# Patient Record
Sex: MALE | Race: WHITE | NOT HISPANIC OR LATINO
[De-identification: names, ages, dates, MRNs, and addresses within clinical notes are randomized per-mention and may not be internally consistent; named-entity substitution may affect disease eponyms.]

---

## 2018-03-09 ENCOUNTER — APPOINTMENT (OUTPATIENT)
Dept: DERMATOLOGY | Facility: CLINIC | Age: 73
End: 2018-03-09
Payer: MEDICARE

## 2018-03-09 PROCEDURE — 99214 OFFICE O/P EST MOD 30 MIN: CPT

## 2022-01-26 ENCOUNTER — TRANSCRIPTION ENCOUNTER (OUTPATIENT)
Age: 77
End: 2022-01-26

## 2022-06-15 ENCOUNTER — RESULT REVIEW (OUTPATIENT)
Age: 77
End: 2022-06-15

## 2022-06-16 ENCOUNTER — APPOINTMENT (OUTPATIENT)
Dept: DERMATOLOGY | Facility: CLINIC | Age: 77
End: 2022-06-16

## 2022-06-16 PROCEDURE — 17261 DSTRJ MAL LES T/A/L .6-1.0CM: CPT

## 2022-06-16 PROCEDURE — 99203 OFFICE O/P NEW LOW 30 MIN: CPT | Mod: 25

## 2022-06-20 ENCOUNTER — APPOINTMENT (OUTPATIENT)
Dept: DERMATOLOGY | Facility: CLINIC | Age: 77
End: 2022-06-20

## 2024-03-22 ENCOUNTER — APPOINTMENT (OUTPATIENT)
Dept: NEPHROLOGY | Facility: CLINIC | Age: 79
End: 2024-03-22
Payer: MEDICARE

## 2024-03-22 VITALS
HEART RATE: 84 BPM | DIASTOLIC BLOOD PRESSURE: 74 MMHG | WEIGHT: 141 LBS | SYSTOLIC BLOOD PRESSURE: 150 MMHG | OXYGEN SATURATION: 98 % | TEMPERATURE: 98 F

## 2024-03-22 DIAGNOSIS — Z00.00 ENCOUNTER FOR GENERAL ADULT MEDICAL EXAMINATION W/OUT ABNORMAL FINDINGS: ICD-10-CM

## 2024-03-22 DIAGNOSIS — Z78.9 OTHER SPECIFIED HEALTH STATUS: ICD-10-CM

## 2024-03-22 PROCEDURE — 99205 OFFICE O/P NEW HI 60 MIN: CPT

## 2024-03-22 NOTE — PHYSICAL EXAM
[General Appearance - In No Acute Distress] : in no acute distress [General Appearance - Alert] : alert [PERRL With Normal Accommodation] : pupils were equal in size, round, and reactive to light [Sclera] : the sclera and conjunctiva were normal [Extraocular Movements] : extraocular movements were intact [Outer Ear] : the ears and nose were normal in appearance [Neck Appearance] : the appearance of the neck was normal [Oropharynx] : the oropharynx was normal [Neck Cervical Mass (___cm)] : no neck mass was observed [Jugular Venous Distention Increased] : there was no jugular-venous distention [Thyroid Nodule] : there were no palpable thyroid nodules [Thyroid Diffuse Enlargement] : the thyroid was not enlarged [Auscultation Breath Sounds / Voice Sounds] : lungs were clear to auscultation bilaterally [Heart Sounds] : normal S1 and S2 [Heart Rate And Rhythm] : heart rate was normal and rhythm regular [Murmurs] : no murmurs [Heart Sounds Gallop] : no gallops [Heart Sounds Pericardial Friction Rub] : no pericardial rub [Bowel Sounds] : normal bowel sounds [FreeTextEntry1] : Trace edema [Abdomen Soft] : soft [Abdomen Tenderness] : non-tender [] : no hepato-splenomegaly [Abdomen Mass (___ Cm)] : no abdominal mass palpated [Cervical Lymph Nodes Enlarged Posterior Bilaterally] : posterior cervical [Cervical Lymph Nodes Enlarged Anterior Bilaterally] : anterior cervical [Supraclavicular Lymph Nodes Enlarged Bilaterally] : supraclavicular [Axillary Lymph Nodes Enlarged Bilaterally] : axillary [Femoral Lymph Nodes Enlarged Bilaterally] : femoral [Inguinal Lymph Nodes Enlarged Bilaterally] : inguinal [No CVA Tenderness] : no ~M costovertebral angle tenderness [No Spinal Tenderness] : no spinal tenderness [Abnormal Walk] : normal gait [Musculoskeletal - Swelling] : no joint swelling seen [Nail Clubbing] : no clubbing  or cyanosis of the fingernails [Motor Tone] : muscle strength and tone were normal

## 2024-03-22 NOTE — HISTORY OF PRESENT ILLNESS
[FreeTextEntry1] : 77 yo man second opinion PKD, CKD Hx Htn, HLD Was told 10 years ago of PKD Over the past 2 years the SCr yolette from 2.9 to 4.2 He has no uremic sxs There is no FH of kidney dz   no FH PKD He is here to better understand his ESKD options

## 2024-03-22 NOTE — QUALITY MEASURES
[No] : patient is not an appropriate candidate for kidney transplantation evaluation [PTH level measured within last] : patient's PTH level measured within the last 12 months

## 2024-03-25 LAB
ABO + RH PNL BLD: NORMAL
ANION GAP SERPL CALC-SCNC: 22 MMOL/L
BUN SERPL-MCNC: 61 MG/DL
CALCIUM SERPL-MCNC: 8.8 MG/DL
CHLORIDE SERPL-SCNC: 98 MMOL/L
CO2 SERPL-SCNC: 24 MMOL/L
CREAT SERPL-MCNC: 5.11 MG/DL
EGFR: 11 ML/MIN/1.73M2
GLUCOSE SERPL-MCNC: 67 MG/DL
POTASSIUM SERPL-SCNC: 4.9 MMOL/L
SODIUM SERPL-SCNC: 143 MMOL/L

## 2024-04-11 ENCOUNTER — APPOINTMENT (OUTPATIENT)
Dept: DERMATOLOGY | Facility: CLINIC | Age: 79
End: 2024-04-11
Payer: MEDICARE

## 2024-04-11 PROCEDURE — 99213 OFFICE O/P EST LOW 20 MIN: CPT | Mod: 25

## 2024-04-11 PROCEDURE — 11102 TANGNTL BX SKIN SINGLE LES: CPT

## 2024-04-24 ENCOUNTER — APPOINTMENT (OUTPATIENT)
Dept: NEPHROLOGY | Facility: CLINIC | Age: 79
End: 2024-04-24
Payer: COMMERCIAL

## 2024-04-24 ENCOUNTER — APPOINTMENT (OUTPATIENT)
Dept: TRANSPLANT | Facility: CLINIC | Age: 79
End: 2024-04-24
Payer: COMMERCIAL

## 2024-04-24 ENCOUNTER — NON-APPOINTMENT (OUTPATIENT)
Age: 79
End: 2024-04-24

## 2024-04-24 ENCOUNTER — LABORATORY RESULT (OUTPATIENT)
Age: 79
End: 2024-04-24

## 2024-04-24 VITALS
HEIGHT: 66 IN | TEMPERATURE: 98 F | DIASTOLIC BLOOD PRESSURE: 75 MMHG | OXYGEN SATURATION: 98 % | RESPIRATION RATE: 14 BRPM | BODY MASS INDEX: 22.66 KG/M2 | SYSTOLIC BLOOD PRESSURE: 146 MMHG | HEART RATE: 85 BPM | WEIGHT: 141 LBS

## 2024-04-24 PROCEDURE — 99204 OFFICE O/P NEW MOD 45 MIN: CPT

## 2024-04-24 PROCEDURE — 99205 OFFICE O/P NEW HI 60 MIN: CPT

## 2024-04-24 RX ORDER — ROSUVASTATIN CALCIUM 20 MG/1
20 TABLET, FILM COATED ORAL DAILY
Refills: 0 | Status: ACTIVE | COMMUNITY

## 2024-04-24 RX ORDER — AMLODIPINE BESYLATE 10 MG/1
10 TABLET ORAL DAILY
Refills: 0 | Status: ACTIVE | COMMUNITY

## 2024-04-24 RX ORDER — ASCORBIC ACID 125 MG
5000 TABLET,CHEWABLE ORAL DAILY
Refills: 0 | Status: ACTIVE | COMMUNITY

## 2024-04-24 RX ORDER — PSYLLIUM HUSK 0.4 G
CAPSULE ORAL
Refills: 0 | Status: ACTIVE | COMMUNITY

## 2024-04-24 RX ORDER — ALLOPURINOL 300 MG/1
300 TABLET ORAL DAILY
Refills: 0 | Status: ACTIVE | COMMUNITY

## 2024-04-24 RX ORDER — LABETALOL HYDROCHLORIDE 100 MG/1
100 TABLET, FILM COATED ORAL 3 TIMES DAILY
Refills: 0 | Status: ACTIVE | COMMUNITY

## 2024-04-24 RX ORDER — SODIUM BICARBONATE 650 MG/1
TABLET ORAL
Refills: 0 | Status: ACTIVE | COMMUNITY

## 2024-04-24 NOTE — HISTORY OF PRESENT ILLNESS
[TextBox_42] :   Mr. FLORY CARCAMO is a 79 year old man presenting for preemptive kidney transplant evaluation.  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 Saw Dr. Rodgers in March and referred for transplatn evaluation. He has a potential livign donor     CKD wad diagnosed 10 years ago when an abnormal creatinine prompted imaging. He has no known family history of kidney disease, but parents  at a young age ~ 40's. .  He has had a very slow progression of kidney disease, now eGFR 11ml/min. No uremic symptoms.  He has hypertension diagnosed 20 years ago, well controlled on medications Hyperlipidemia also controlled on statin Remote history of gout, remains on allopurinol but no flares in years.  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 Basal cell cancer recently dx Moh's planned, no history of melanoma.   Surgical History Left inguinal hernia repair 3 years ago.   No history of DM  No history of coronary artery disease. No known history of peripheral vascular disease, no claudication No history of DVT or PE No history of Stroke, no Seizures No history of lung problems including COPD, Asthma, Sleep apnea, pneumonia, bronchitis. No history of kidney stones, voiding problems, urological procedures/surgeries, recurrent urinary tract infections, gross hematuria. No history of viral infections such as hepatitis or HIV, no history of tuberculosis No history of bleeding problems No history of NSAID or Opioid use  No Sensitizing events: - No blood transfusions, prior transplant    Most recent Hospitalizations for :  Hernia repair 3 years ago, done at [place].    Exercise tolerance -: Very active, walks 3 miles a day, no limitations. Independent with ADLs, no assist devises, drives   Health Maintenance Colonoscopy: 5-7 years ago. No polyps  PSA reportedly normal  Vaccinations: Flu fall , COVID x4, last one in      Potential live donors : Friend.    Family History:- Father and mother  when he was 12 years old. He does not know their medical problems.  He had 1 brother, passed away from meningitis.  Family medical history is mostly unknown.   Social history: -  for 43 years, lives with wife and dog. No children. [place] have a house in [place], likes the sun.  Used to own an automobile company, retired.   Drinks 3 glasses of wine every night, never smoked cigarettes. smokes marijuana daily,   History of recreational cocaine use in the 70's, never used IV drugs.   Medications Allopurinol Rosuvastatin Amlodipine Vitamin B12 Folate  Labetalol  Sodium bicarbonate  Famotidine   Not on blood thinners Aspirin, Plavix, Eliquis, Coumadin No herbal supplements

## 2024-04-24 NOTE — PLAN
[FreeTextEntry1] :   Proceed with completing the pre transplant work up - Pre transplant labs will be drawn today including ABO, HLA, Luminex, serology for active and chronic infections   - Cardiac:  Send for echo and stress test, cardiology evaluation   - Cancer screening:  Colonoscopy, PSA, Dermatology   - Imaging: CXR, non contrast CT abdomen and pelvis, MRA brain r/o aneurysm.   Once workup is complete, patient will be presented for review by the multidisciplinary listing committee.

## 2024-04-24 NOTE — ASSESSMENT
[FreeTextEntry1] :     Mr. FLORY CARCAMO is a 79 year old M evaluated for preemptive kidney transplantation. Etiology of kidney disease is Polycystic Kidney disease diagnosed 10 years ago with slow progression.  He has no signs or symptoms of uremia, mild LE edema, otherwise in good health.  No history of cyst pain, hematuria. rupture.  No known CAD, DM, PVD.  HTN is well controlled on medications. He has squamous cell ca of lip 25 yrs ago, and BCC in the face recently dx, regularly followed by dermatology. Had a colonoscopy 5-7years ago, reportedly normal.  Excellent functional status walks 3 miles a day.  BMI is 22.7  There is no evidence of an active medical or psychiatric illness, malignancy or infection that would preclude kidney transplantation. Despite his advanced age he is healthy and has excellent functional status. Also has a potential donor. He is a good candidate for kidney transplantation - living donor would be best given he is preemptive and no waiting time.

## 2024-04-24 NOTE — REASON FOR VISIT
[Initial] : an initial visit for [Kidney Transplant Evaluation] : kidney transplant evaluation [Spouse] : spouse

## 2024-04-24 NOTE — END OF VISIT
[Time Spent: ___ minutes] : I have spent [unfilled] minutes of time on the encounter. [] : I have personally discussed the risks and benefits of transplantation and patient attended transplant education class where the following was disclosed: Reviewed factors affecting survival and morbidity while on dialysis, the transplant wait list and reviewed morris-operative and long-term risk factors affecting outcome in kidney transplantation.  One year SRTR outcomes for national and [facility] were discussed in regards to patient survival and graft survival after transplantation.  Details of transplant surgery, including complications were discussed. Immunosuppression and complications including infection, malignancy and new onset diabetes were discussed.  Benefits of live donor transplantation as well as variability in wait times across regions and multiple listing were discussed. KDPI >85% and PHS high risk criteria donors were discussed. HCV kidney transplantation was discussed. Will proceed with completing/ updating work up and listing for transplant/ live donor transplant once work up is reviewed and found to be acceptable by multidisciplinary listing committee.

## 2024-04-24 NOTE — PHYSICAL EXAM
[General Appearance - Alert] : alert [General Appearance - In No Acute Distress] : in no acute distress [General Appearance - Well Nourished] : well nourished [General Appearance - Well Developed] : well developed [General Appearance - Well-Appearing] : healthy appearing [Sclera] : the sclera and conjunctiva were normal [PERRL With Normal Accommodation] : pupils were equal in size, round, and reactive to light [Oropharynx] : the oropharynx was normal [Neck Appearance] : the appearance of the neck was normal [Neck Cervical Mass (___cm)] : no neck mass was observed [Jugular Venous Distention Increased] : there was no jugular-venous distention [Respiration, Rhythm And Depth] : normal respiratory rhythm and effort [Exaggerated Use Of Accessory Muscles For Inspiration] : no accessory muscle use [Auscultation Breath Sounds / Voice Sounds] : lungs were clear to auscultation bilaterally [Heart Rate And Rhythm] : heart rate was normal and rhythm regular [Heart Sounds] : normal S1 and S2 [Murmurs] : no murmurs [Heart Sounds Gallop] : no gallops [Heart Sounds Pericardial Friction Rub] : no pericardial rub [Full Pulse] : the pedal pulses are present [Bowel Sounds] : normal bowel sounds [Abdomen Soft] : soft [Abdomen Tenderness] : non-tender [Abnormal Walk] : normal gait [Musculoskeletal - Swelling] : no joint swelling seen [] : right posterior tibial palpable [No Focal Deficits] : no focal deficits [Oriented To Time, Place, And Person] : oriented to person, place, and time [FreeTextEntry1] : 2+ ankle edema bilaterally

## 2024-04-24 NOTE — REVIEW OF SYSTEMS
[Tattoos] : tattoos [Anemia] : anemia [Fever] : no fever [Chills] : no chills [Recent Weight Gain (___ Lbs)] : no recent weight gain [Sore throat] : no sore throat [Double vision] : no double vision [Eye Pain] : no eye pain [Chest Pain] : no chest pain [Palpitations] : no palpitations [SOB] : no shortness of breath [Pleuritic Chest Pain] : no pleuritic chest pain [Abdominal Pain] : no abdominal pain [Nausea] : no nausea [Constipation] : no constipation [Diarrhea] : diarrhea [Vomiting] : no vomiting [Dysuria] : no dysuria [Frequency] : no frequency [Hematuria] : no hematuria [UTI] : no UTI [Headache] : no headache [Dizziness] : no dizziness [Fainting] : no fainting [Confusion] : no confusion [Memory Loss] : no memory loss [Unsteady Gait] : steady gait [Seizures] : no seizures [Suicidal] : not suicidal [Hallucinations] : no hallucinations [Anxiety] : no anxiety [Depression] : no depression [de-identified] : Last one was several years ago. 65 years ago.

## 2024-04-25 LAB
ABO + RH PNL BLD: NORMAL
ABO + RH PNL BLD: NORMAL
ALBUMIN SERPL ELPH-MCNC: 5 G/DL
ALP BLD-CCNC: 87 U/L
ALT SERPL-CCNC: 11 U/L
ANION GAP SERPL CALC-SCNC: 22 MMOL/L
APPEARANCE: CLEAR
AST SERPL-CCNC: 14 U/L
BACTERIA: NEGATIVE /HPF
BASOPHILS # BLD AUTO: 0.03 K/UL
BASOPHILS NFR BLD AUTO: 0.3 %
BILIRUB SERPL-MCNC: 0.3 MG/DL
BILIRUBIN URINE: NEGATIVE
BLOOD URINE: NEGATIVE
BUN SERPL-MCNC: 61 MG/DL
C PEPTIDE SERPL-MCNC: 4 NG/ML
CALCIUM SERPL-MCNC: 9.2 MG/DL
CAST: 0 /LPF
CHLORIDE SERPL-SCNC: 103 MMOL/L
CHOLEST SERPL-MCNC: 127 MG/DL
CMV IGG SERPL QL: >10 U/ML
CMV IGG SERPL-IMP: POSITIVE
CO2 SERPL-SCNC: 19 MMOL/L
COLOR: YELLOW
COVID-19 NUCLEOCAPSID  GAM ANTIBODY INTERPRETATION: NEGATIVE
COVID-19 SPIKE DOMAIN ANTIBODY INTERPRETATION: POSITIVE
CREAT SERPL-MCNC: 4.58 MG/DL
CREAT SPEC-SCNC: 61 MG/DL
CREAT/PROT UR: 1.4 RATIO
EBV EA AB SER IA-ACNC: <5 U/ML
EBV EA AB TITR SER IF: POSITIVE
EBV EA IGG SER QL IA: >600 U/ML
EBV EA IGG SER-ACNC: NEGATIVE
EBV EA IGM SER IA-ACNC: NEGATIVE
EBV PATRN SPEC IB-IMP: NORMAL
EBV VCA IGG SER IA-ACNC: >750 U/ML
EBV VCA IGM SER QL IA: <10 U/ML
EGFR: 12 ML/MIN/1.73M2
EOSINOPHIL # BLD AUTO: 0.3 K/UL
EOSINOPHIL NFR BLD AUTO: 3.4 %
EPITHELIAL CELLS: 0 /HPF
EPSTEIN-BARR VIRUS CAPSID ANTIGEN IGG: POSITIVE
ESTIMATED AVERAGE GLUCOSE: 105 MG/DL
GLUCOSE QUALITATIVE U: NEGATIVE MG/DL
GLUCOSE SERPL-MCNC: 92 MG/DL
HAV IGM SER QL: NONREACTIVE
HBA1C MFR BLD HPLC: 5.3 %
HBV CORE IGG+IGM SER QL: REACTIVE
HBV SURFACE AB SER QL: REACTIVE
HBV SURFACE AB SERPL IA-ACNC: 283.5 MIU/ML
HBV SURFACE AG SER QL: NONREACTIVE
HCT VFR BLD CALC: 27.5 %
HCV AB SER QL: NONREACTIVE
HCV S/CO RATIO: 0.1 S/CO
HDLC SERPL-MCNC: 72 MG/DL
HEPATITIS A IGG ANTIBODY: NONREACTIVE
HGB BLD-MCNC: 9 G/DL
HIV1+2 AB SPEC QL IA.RAPID: NONREACTIVE
HSV 1+2 IGG SER IA-IMP: POSITIVE
HSV 1+2 IGG SER IA-IMP: POSITIVE
HSV1 IGG SER QL: 20.6 INDEX
HSV2 IGG SER QL: 6.58 INDEX
IMM GRANULOCYTES NFR BLD AUTO: 0.3 %
KETONES URINE: ABNORMAL MG/DL
LDLC SERPL CALC-MCNC: 45 MG/DL
LEUKOCYTE ESTERASE URINE: NEGATIVE
LYMPHOCYTES # BLD AUTO: 1.81 K/UL
LYMPHOCYTES NFR BLD AUTO: 20.7 %
MAGNESIUM SERPL-MCNC: 1.7 MG/DL
MAN DIFF?: NORMAL
MCHC RBC-ENTMCNC: 31.5 PG
MCHC RBC-ENTMCNC: 32.7 GM/DL
MCV RBC AUTO: 96.2 FL
MICROSCOPIC-UA: NORMAL
MONOCYTES # BLD AUTO: 0.81 K/UL
MONOCYTES NFR BLD AUTO: 9.2 %
NEUTROPHILS # BLD AUTO: 5.78 K/UL
NEUTROPHILS NFR BLD AUTO: 66.1 %
NITRITE URINE: NEGATIVE
NONHDLC SERPL-MCNC: 56 MG/DL
PH URINE: 8
PHOSPHATE SERPL-MCNC: 3.5 MG/DL
PLATELET # BLD AUTO: 307 K/UL
POTASSIUM SERPL-SCNC: 3.8 MMOL/L
PROT SERPL-MCNC: 7.7 G/DL
PROT UR-MCNC: 87 MG/DL
PROTEIN URINE: 100 MG/DL
PSA SERPL-MCNC: 4.91 NG/ML
RBC # BLD: 2.86 M/UL
RBC # FLD: 14 %
RED BLOOD CELLS URINE: 0 /HPF
RUBV IGG FLD-ACNC: 3.9 INDEX
RUBV IGG SER-IMP: POSITIVE
SARS-COV-2 AB SERPL IA-ACNC: 93.3 U/ML
SARS-COV-2 AB SERPL QL IA: 0.15 INDEX
SARS-COV-2 N GENE NPH QL NAA+PROBE: NOT DETECTED
SODIUM SERPL-SCNC: 144 MMOL/L
SPECIFIC GRAVITY URINE: 1.01
T GONDII AB SER-IMP: POSITIVE
T GONDII IGG SER QL: 49.6 IU/ML
T PALLIDUM AB SER QL IA: NEGATIVE
TRIGL SERPL-MCNC: 43 MG/DL
UROBILINOGEN URINE: 0.2 MG/DL
VZV AB TITR SER: POSITIVE
VZV IGG SER IF-ACNC: 1070 INDEX
WBC # FLD AUTO: 8.76 K/UL
WHITE BLOOD CELLS URINE: 0 /HPF

## 2024-04-29 LAB
M TB IFN-G BLD-IMP: NEGATIVE
QUANTIFERON TB PLUS MITOGEN MINUS NIL: >10 IU/ML
QUANTIFERON TB PLUS NIL: 0.02 IU/ML
QUANTIFERON TB PLUS TB1 MINUS NIL: 0 IU/ML
QUANTIFERON TB PLUS TB2 MINUS NIL: 0.01 IU/ML

## 2024-04-30 ENCOUNTER — NON-APPOINTMENT (OUTPATIENT)
Age: 79
End: 2024-04-30

## 2024-05-08 ENCOUNTER — APPOINTMENT (OUTPATIENT)
Dept: CARDIOLOGY | Facility: CLINIC | Age: 79
End: 2024-05-08

## 2024-05-09 ENCOUNTER — APPOINTMENT (OUTPATIENT)
Dept: DERMATOLOGY | Facility: CLINIC | Age: 79
End: 2024-05-09
Payer: MEDICARE

## 2024-05-09 DIAGNOSIS — C44.319 BASAL CELL CARCINOMA OF SKIN OF OTHER PARTS OF FACE: ICD-10-CM

## 2024-05-09 PROCEDURE — 17312 MOHS ADDL STAGE: CPT

## 2024-05-09 PROCEDURE — 17311 MOHS 1 STAGE H/N/HF/G: CPT

## 2024-05-09 PROCEDURE — 12052 INTMD RPR FACE/MM 2.6-5.0 CM: CPT

## 2024-05-09 NOTE — HISTORY OF PRESENT ILLNESS
[FreeTextEntry1] : MMS for BCC of R malar cheek  [de-identified] : 05/09/2024   Referred by: Dr. Zelaya  We had the pleasure of seeing your patient in consultation for Mohs Micrographic Surgery. Mr. FLORY CARCAMO is a 79 year old M who presents for MMS for BCC of the R central malar cheek. His dermatologist recommended biopsy of the lesion which the patient noticed growing.  Pending renal transplant  He has had Mohs surgery before with Dr. Hughes  SH: Here with wife Douglas Upcoming travel plans: None  Pertinent positives noted below  History of HIV or hepatitis: No Blood thinners: None  Antibiotic Prophylaxis: None  Medical implants: None He plans to have a renal transplant Nov 2024   The patient's review of systems questionnaire was reviewed. Education needs were identified. There were no barriers to learning.

## 2024-05-10 ENCOUNTER — NON-APPOINTMENT (OUTPATIENT)
Age: 79
End: 2024-05-10

## 2024-05-10 ENCOUNTER — APPOINTMENT (OUTPATIENT)
Dept: CARDIOLOGY | Facility: CLINIC | Age: 79
End: 2024-05-10
Payer: COMMERCIAL

## 2024-05-10 VITALS
HEIGHT: 66 IN | BODY MASS INDEX: 23.63 KG/M2 | SYSTOLIC BLOOD PRESSURE: 120 MMHG | HEART RATE: 73 BPM | WEIGHT: 147 LBS | OXYGEN SATURATION: 95 % | DIASTOLIC BLOOD PRESSURE: 60 MMHG

## 2024-05-10 VITALS — HEART RATE: 72 BPM | SYSTOLIC BLOOD PRESSURE: 118 MMHG | DIASTOLIC BLOOD PRESSURE: 60 MMHG

## 2024-05-10 DIAGNOSIS — I10 ESSENTIAL (PRIMARY) HYPERTENSION: ICD-10-CM

## 2024-05-10 DIAGNOSIS — E78.5 HYPERLIPIDEMIA, UNSPECIFIED: ICD-10-CM

## 2024-05-10 DIAGNOSIS — I45.10 UNSPECIFIED RIGHT BUNDLE-BRANCH BLOCK: ICD-10-CM

## 2024-05-10 DIAGNOSIS — I44.0 ATRIOVENTRICULAR BLOCK, FIRST DEGREE: ICD-10-CM

## 2024-05-10 PROCEDURE — 93000 ELECTROCARDIOGRAM COMPLETE: CPT

## 2024-05-10 PROCEDURE — 99205 OFFICE O/P NEW HI 60 MIN: CPT

## 2024-05-10 NOTE — HISTORY OF PRESENT ILLNESS
[FreeTextEntry1] : Mr. Ap Arana is an 79 year old man presenting for cardiovascular evaluation as a preemptive candidate for kidney transplant. Advanced kidney disease due to polycystic kidney disease. He has hypertension managed on medications and hyperlipidemia. There is no known history of coronary artery disease. 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 He is active, walks one to two miles a day. He has no dyspnea, chest pain or palpitations.

## 2024-05-10 NOTE — PHYSICAL EXAM
[Normal Rate] : normal [Rhythm Regular] : regular [Normal S1] : normal S1 [Normal S2] : normal S2 [No Murmur] : no murmurs heard [___ +] : bilateral [unfilled]U+ pitting edema to the ankles [2+] : left 2+ [Right Carotid Bruit] : no bruit heard over the right carotid [Left Carotid Bruit] : no bruit heard over the left carotid [1+] : left 1+ [No Abnormalities] : the abdominal aorta was not enlarged and no bruit was heard [Normal] : alert and oriented, normal memory

## 2024-05-10 NOTE — DISCUSSION/SUMMARY
[Patient] : the patient [EKG obtained to assist in diagnosis and management of assessed problem(s)] : EKG obtained to assist in diagnosis and management of assessed problem(s) [A-V Block] : A-V block [Bundle Branch Block] : ~T bundle branch block [Echocardiogram] : an echocardiogram [Hyperlipidemia] : hyperlipidemia [Hypertension] : hypertension [Responding to Treatment] : responding to treatment [de-identified] : 1st degree heart block, RBBB [FreeTextEntry1] : 79 year old man active and well with no known cardiac disease, normal exam, but EKG does demonstrate 1st degree heart block and RBBB which he has never been aware. Arranging cardiac echo and treadmill exercise stress test with radionuclide perfusion imaging.

## 2024-05-14 ENCOUNTER — APPOINTMENT (OUTPATIENT)
Dept: UROLOGY | Facility: CLINIC | Age: 79
End: 2024-05-14
Payer: MEDICARE

## 2024-05-14 VITALS
OXYGEN SATURATION: 97 % | DIASTOLIC BLOOD PRESSURE: 69 MMHG | SYSTOLIC BLOOD PRESSURE: 118 MMHG | HEART RATE: 76 BPM | BODY MASS INDEX: 23.3 KG/M2 | WEIGHT: 145 LBS | HEIGHT: 66 IN

## 2024-05-14 DIAGNOSIS — Z78.9 OTHER SPECIFIED HEALTH STATUS: ICD-10-CM

## 2024-05-14 PROCEDURE — 99205 OFFICE O/P NEW HI 60 MIN: CPT

## 2024-05-14 RX ORDER — GLUCOSAMINE HCL/CHONDROITIN SU 500-400 MG
CAPSULE ORAL
Refills: 0 | Status: DISCONTINUED | COMMUNITY
End: 2024-05-14

## 2024-05-14 RX ORDER — VITAMIN B COMPLEX
CAPSULE ORAL
Refills: 0 | Status: DISCONTINUED | COMMUNITY
End: 2024-05-14

## 2024-05-14 RX ORDER — VITAMIN K2 90 MCG
CAPSULE ORAL
Refills: 0 | Status: DISCONTINUED | COMMUNITY
End: 2024-05-14

## 2024-05-14 RX ORDER — LANSOPRAZOLE 15 MG/1
15 CAPSULE, DELAYED RELEASE ORAL
Qty: 60 | Refills: 0 | Status: ACTIVE | COMMUNITY
Start: 2024-05-14

## 2024-05-14 RX ORDER — FAMOTIDINE 40 MG/1
40 TABLET, FILM COATED ORAL DAILY
Refills: 0 | Status: DISCONTINUED | COMMUNITY
End: 2024-05-14

## 2024-05-14 NOTE — PHYSICAL EXAM
[Normal Appearance] : normal appearance [Well Groomed] : well groomed [General Appearance - In No Acute Distress] : no acute distress [Edema] : no peripheral edema [Respiration, Rhythm And Depth] : normal respiratory rhythm and effort [Exaggerated Use Of Accessory Muscles For Inspiration] : no accessory muscle use [Abdomen Soft] : soft [Abdomen Tenderness] : non-tender [Costovertebral Angle Tenderness] : no ~M costovertebral angle tenderness [Urethral Meatus] : meatus normal [Penis Abnormality] : normal circumcised penis [Urinary Bladder Findings] : the bladder was normal on palpation [Scrotum] : the scrotum was normal [Rectal Exam - Seminal Vesicles] : the seminal vesicles were normal [Epididymis] : the epididymides were normal [Testes Tenderness] : no tenderness of the testes [Testes Mass (___cm)] : there were no testicular masses [Anus Abnormality] : the anus and perineum were normal [Rectal Exam - Rectum] : digital rectal exam was normal [Prostate Enlargement] : the prostate was not enlarged [Prostate Tenderness] : the prostate was not tender [Prostate Size ___ (0-4)] : prostate size [unfilled] (scale: 0-4) [Normal Station and Gait] : the gait and station were normal for the patient's age [] : no rash [No Focal Deficits] : no focal deficits [Oriented To Time, Place, And Person] : oriented to person, place, and time [Affect] : the affect was normal [Mood] : the mood was normal [Inguinal Lymph Nodes Enlarged Bilaterally] : inguinal [de-identified] : 1 cm hard left sided mid prostate nodule.

## 2024-05-14 NOTE — HISTORY OF PRESENT ILLNESS
[FreeTextEntry1] : Patient is getting up at hourly at night to void. No dysuria. he feels empty after voiding.  no hesitancy.

## 2024-05-14 NOTE — ASSESSMENT
[FreeTextEntry1] : Impression"  prostate nodule elevated PSA CKD nocturia, suspect it is due to sleep apnea  Plan:  free and total PSA prostate MRI.  follow up 3 weeks.  recommended sleep study. he prefers to wait until after the other testing is done.

## 2024-05-14 NOTE — LETTER BODY
[Dear  ___] : Dear  [unfilled], [Courtesy Letter:] : I had the pleasure of seeing your patient, [unfilled], in my office today. [Please see my note below.] : Please see my note below. [Sincerely,] : Sincerely, [FreeTextEntry3] : Ed  Taj Lockett MD [facility]  [facility] [place] [facility] [employer] at [facility]

## 2024-05-15 LAB
PSA FREE FLD-MCNC: 24 %
PSA FREE SERPL-MCNC: 0.84 NG/ML
PSA SERPL-MCNC: 3.54 NG/ML

## 2024-05-21 ENCOUNTER — RESULT REVIEW (OUTPATIENT)
Age: 79
End: 2024-05-21

## 2024-05-21 ENCOUNTER — APPOINTMENT (OUTPATIENT)
Dept: CT IMAGING | Facility: CLINIC | Age: 79
End: 2024-05-21
Payer: COMMERCIAL

## 2024-05-21 ENCOUNTER — APPOINTMENT (OUTPATIENT)
Dept: MRI IMAGING | Facility: CLINIC | Age: 79
End: 2024-05-21
Payer: COMMERCIAL

## 2024-05-21 ENCOUNTER — OUTPATIENT (OUTPATIENT)
Dept: OUTPATIENT SERVICES | Facility: HOSPITAL | Age: 79
LOS: 1 days | End: 2024-05-21

## 2024-05-21 DIAGNOSIS — Z01.818 ENCOUNTER FOR OTHER PREPROCEDURAL EXAMINATION: ICD-10-CM

## 2024-05-21 PROCEDURE — 74176 CT ABD & PELVIS W/O CONTRAST: CPT | Mod: 26,MH

## 2024-05-21 PROCEDURE — 71250 CT THORAX DX C-: CPT | Mod: 26,MH

## 2024-05-21 PROCEDURE — 70551 MRI BRAIN STEM W/O DYE: CPT | Mod: 26,MH

## 2024-05-23 ENCOUNTER — OUTPATIENT (OUTPATIENT)
Dept: OUTPATIENT SERVICES | Facility: HOSPITAL | Age: 79
LOS: 1 days | End: 2024-05-23
Payer: MEDICARE

## 2024-05-23 ENCOUNTER — RESULT REVIEW (OUTPATIENT)
Age: 79
End: 2024-05-23

## 2024-05-23 ENCOUNTER — APPOINTMENT (OUTPATIENT)
Dept: MRI IMAGING | Facility: CLINIC | Age: 79
End: 2024-05-23
Payer: MEDICARE

## 2024-05-23 DIAGNOSIS — N40.2 NODULAR PROSTATE WITHOUT LOWER URINARY TRACT SYMPTOMS: ICD-10-CM

## 2024-05-23 DIAGNOSIS — R97.20 ELEVATED PROSTATE SPECIFIC ANTIGEN [PSA]: ICD-10-CM

## 2024-05-23 PROCEDURE — 76498 UNLISTED MR PROCEDURE: CPT

## 2024-05-23 PROCEDURE — 76498P: CUSTOM | Mod: 26,MH

## 2024-05-23 PROCEDURE — A9585: CPT

## 2024-05-23 PROCEDURE — 72197 MRI PELVIS W/O & W/DYE: CPT | Mod: 26,MH

## 2024-05-23 PROCEDURE — 72197 MRI PELVIS W/O & W/DYE: CPT

## 2024-05-29 ENCOUNTER — APPOINTMENT (OUTPATIENT)
Dept: NEPHROLOGY | Facility: CLINIC | Age: 79
End: 2024-05-29
Payer: MEDICARE

## 2024-05-29 ENCOUNTER — APPOINTMENT (OUTPATIENT)
Dept: INFECTIOUS DISEASE | Facility: CLINIC | Age: 79
End: 2024-05-29
Payer: COMMERCIAL

## 2024-05-29 VITALS
DIASTOLIC BLOOD PRESSURE: 63 MMHG | TEMPERATURE: 97.3 F | HEIGHT: 66 IN | WEIGHT: 146 LBS | HEART RATE: 75 BPM | SYSTOLIC BLOOD PRESSURE: 136 MMHG | OXYGEN SATURATION: 98 % | BODY MASS INDEX: 23.46 KG/M2

## 2024-05-29 VITALS
WEIGHT: 145 LBS | TEMPERATURE: 97.6 F | HEART RATE: 77 BPM | OXYGEN SATURATION: 96 % | DIASTOLIC BLOOD PRESSURE: 66 MMHG | HEIGHT: 66 IN | SYSTOLIC BLOOD PRESSURE: 118 MMHG | BODY MASS INDEX: 23.3 KG/M2

## 2024-05-29 DIAGNOSIS — Z23 ENCOUNTER FOR IMMUNIZATION: ICD-10-CM

## 2024-05-29 DIAGNOSIS — R76.8 OTHER SPECIFIED ABNORMAL IMMUNOLOGICAL FINDINGS IN SERUM: ICD-10-CM

## 2024-05-29 PROCEDURE — 99204 OFFICE O/P NEW MOD 45 MIN: CPT

## 2024-05-29 PROCEDURE — G2211 COMPLEX E/M VISIT ADD ON: CPT

## 2024-05-29 PROCEDURE — 99214 OFFICE O/P EST MOD 30 MIN: CPT

## 2024-05-29 NOTE — ASSESSMENT
[FreeTextEntry1] : 78 yo man PKD, CKD Hx Htn, HLD Was told 10 years ago of PKD Over the past 2 years the SCr yolette from 2.9 to 4.2 He has no uremic sxs There is no FH of kidney dz   no FH PKD IS IN PROCESS OF TRANSPLANT EVALUATION ----- PKD   Last SCr dec to 4.58 from over 5   No uremic sxs, no immediate need for RRT   He wants a living donor transplant / has a donor/ workup in progress   We discussed in detail that he may need dialysis before receiving the transplant if he were to become uremic or    other indications developed- but he is OK now  Htn   BP much better  Anemia   OK at present, may start KATYA is still anemic  The total time of preparation for this visit, the visit itself and writing the note was 33 minutes

## 2024-05-29 NOTE — HISTORY OF PRESENT ILLNESS
[FreeTextEntry1] : 80 yo man PKD, CKD Hx Htn, HLD Was told 10 years ago of PKD Over the past 2 years the SCr yolette from 2.9 to 4.2 He has no uremic sxs There is no FH of kidney dz   no FH PKD IS IN PROCESS OF TRANSPLANT EVALUATION

## 2024-05-29 NOTE — PHYSICAL EXAM
[General Appearance - Alert] : alert [General Appearance - In No Acute Distress] : in no acute distress [Sclera] : the sclera and conjunctiva were normal [PERRL With Normal Accommodation] : pupils were equal in size, round, and reactive to light [Extraocular Movements] : extraocular movements were intact [Outer Ear] : the ears and nose were normal in appearance [Oropharynx] : the oropharynx was normal [Neck Appearance] : the appearance of the neck was normal [Neck Cervical Mass (___cm)] : no neck mass was observed [Jugular Venous Distention Increased] : there was no jugular-venous distention [Thyroid Diffuse Enlargement] : the thyroid was not enlarged [Thyroid Nodule] : there were no palpable thyroid nodules [Auscultation Breath Sounds / Voice Sounds] : lungs were clear to auscultation bilaterally [Heart Rate And Rhythm] : heart rate was normal and rhythm regular [Heart Sounds] : normal S1 and S2 [Heart Sounds Gallop] : no gallops [Murmurs] : no murmurs [Heart Sounds Pericardial Friction Rub] : no pericardial rub [FreeTextEntry1] : Trace edema [Bowel Sounds] : normal bowel sounds [Abdomen Soft] : soft [Abdomen Tenderness] : non-tender [] : no hepato-splenomegaly [Abdomen Mass (___ Cm)] : no abdominal mass palpated [Cervical Lymph Nodes Enlarged Posterior Bilaterally] : posterior cervical [Cervical Lymph Nodes Enlarged Anterior Bilaterally] : anterior cervical [Supraclavicular Lymph Nodes Enlarged Bilaterally] : supraclavicular [Axillary Lymph Nodes Enlarged Bilaterally] : axillary [Femoral Lymph Nodes Enlarged Bilaterally] : femoral [Inguinal Lymph Nodes Enlarged Bilaterally] : inguinal [No CVA Tenderness] : no ~M costovertebral angle tenderness [No Spinal Tenderness] : no spinal tenderness [Abnormal Walk] : normal gait [Nail Clubbing] : no clubbing  or cyanosis of the fingernails [Musculoskeletal - Swelling] : no joint swelling seen [Motor Tone] : muscle strength and tone were normal

## 2024-05-30 ENCOUNTER — NON-APPOINTMENT (OUTPATIENT)
Age: 79
End: 2024-05-30

## 2024-05-30 NOTE — END OF VISIT
[] : Fellow [FreeTextEntry3] : Patient seen and examined with Dr Camilo I agree with his history exam adn plans as noted above Patient younger than stated age. Born and lives in  area Was in [facility]  but no travel abroad No hx of Tb no hx of exotic trave Will filll in missing serologies Needs hep A vaccine will need Hep B flare prophylaxis post transplant RTC in 1-2 mo

## 2024-05-30 NOTE — ASSESSMENT
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 If patient is positive and receives transplant he would be at a small risk for activation and will follow standard hep B cab+ protocol  Follow up: Dr. Herbert Camilo in 2 months

## 2024-05-30 NOTE — PHYSICAL EXAM
[General Appearance - Alert] : alert [General Appearance - In No Acute Distress] : in no acute distress [General Appearance - Well Nourished] : well nourished [General Appearance - Well-Appearing] : healthy appearing [Sclera] : the sclera and conjunctiva were normal [PERRL With Normal Accommodation] : pupils were equal in size, round, reactive to light [Extraocular Movements] : extraocular movements were intact [Outer Ear] : the ears and nose were normal in appearance [Both Tympanic Membranes Were Examined] : both tympanic membranes were normal [Oropharynx] : the oropharynx was normal with no thrush [Neck Appearance] : the appearance of the neck was normal [Neck Cervical Mass (___cm)] : no neck mass was observed [Jugular Venous Distention Increased] : there was no jugular-venous distention [Respiration, Rhythm And Depth] : normal respiratory rhythm and effort [Exaggerated Use Of Accessory Muscles For Inspiration] : no accessory muscle use [Auscultation Breath Sounds / Voice Sounds] : lungs were clear to auscultation bilaterally [Heart Rate And Rhythm] : heart rate was normal and rhythm regular [Heart Sounds] : normal S1 and S2 [Heart Sounds Gallop] : no gallops [Murmurs] : no murmurs [Heart Sounds Pericardial Friction Rub] : no pericardial rub [Bowel Sounds] : normal bowel sounds [Abdomen Soft] : soft [Abdomen Tenderness] : non-tender [Abdomen Mass (___ Cm)] : no abdominal mass palpated [Cervical Lymph Nodes Enlarged Posterior Bilaterally] : posterior cervical [Cervical Lymph Nodes Enlarged Anterior Bilaterally] : anterior cervical [Supraclavicular Lymph Nodes Enlarged Bilaterally] : supraclavicular [Musculoskeletal - Swelling] : no joint swelling [Nail Clubbing] : no clubbing  or cyanosis of the fingernails [Motor Tone] : muscle strength and tone were normal [Skin Color & Pigmentation] : normal skin color and pigmentation [] : no rash [Oriented To Time, Place, And Person] : oriented to person, place, and time [Affect] : the affect was normal [FreeTextEntry1] : 1-2+ edema in b/l lower extremities

## 2024-05-30 NOTE — HISTORY OF PRESENT ILLNESS
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 He is here with his wife for ID clearance for transplant. He states he feels well without acute complaints. He follows with a PCP. He has a donor who is waiting to lose approx 20lbs before she can undergo transplantation.   SH: lives with wife on [address], No children, , Darya, never lived out of the country, traveled to [facility] in 2/2024, 10/2023 [place], travels approximately 1 a year. occasional wine and marijuana use, denies tobacco, ridgeback (dog) and cat passed away, denies sick contacts.

## 2024-06-05 ENCOUNTER — NON-APPOINTMENT (OUTPATIENT)
Age: 79
End: 2024-06-05

## 2024-06-12 PROBLEM — N40.2 PROSTATE NODULE: Status: ACTIVE | Noted: 2024-05-14

## 2024-06-13 ENCOUNTER — APPOINTMENT (OUTPATIENT)
Dept: UROLOGY | Facility: CLINIC | Age: 79
End: 2024-06-13
Payer: MEDICARE

## 2024-06-13 DIAGNOSIS — N40.2 NODULAR PROSTATE W/OUT LOWER URINARY TRACT SYMPTOMS: ICD-10-CM

## 2024-06-13 PROCEDURE — 99442: CPT | Mod: 93

## 2024-06-13 NOTE — HISTORY OF PRESENT ILLNESS
[Other Location: e.g. School (Enter Location, City,State)___] : at [unfilled], at the time of the visit. [Medical Office: ([facility])___] : at the medical office located in  [Verbal consent obtained from patient] : the patient, [unfilled] [FreeTextEntry1] : FLORY CARCAMO is an 79 year-old man with a history of polycystic kidney disease and is in the preliminary stages of getting a renal transplant. He had a PSA of 4.91ng/mL in April, repeat in May was 3.54ng/mL with 24% free fraction. MRI was performed recently which showed a prostate volume of 50mL and a PI-RADS 4 lesion. 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   It is recommended he undergo a prostate biopsy.   Denies any family history of prostate cancer. His father passed when he was young.   He reports nocturia x4-5. Strong urinary stream. He denies hematuria or dysuria. There have been no episodes of retention or urinary tract infections. He has not required catheterization of his bladder.

## 2024-06-13 NOTE — ASSESSMENT
[FreeTextEntry1] : Discussed transperineal fusion guided biopsy with the patient today. Explained to patient that the MRI images and transrectal ultrasound images allows us to retrieve biopsy samples from the lesion seen on the MRI. We will also take samples from a 12 core biopsy template of the prostate to assess for the presence of clinically significant cancer. Discussed the use of local anesthesia for this procedure, in addition to the option for a mild oral sedative. Reviewed the importance of a fleet enema the morning of the procedure. Discussed with patient that a transperineal approach has a low risk for infection. Discussed risks and benefits of a transperineal fusion biopsy.    Patient agrees to move forward with transperineal biopsy with Dr. Smith. The patient is aware to expect hematuria x2 weeks and up to 4 weeks of hematospermia. There is a risk of infection albeit much lower than transrectal approach. In some cases patients can experience erectile dysfunction but this is usually self-limiting. Any fever/chills after the biopsy the patient is contact the office and go to the ER for an immediate evaluation. Patient verbalized understanding of the procedure and instructions prior to his biopsy appointment.

## 2024-06-17 ENCOUNTER — OUTPATIENT (OUTPATIENT)
Dept: OUTPATIENT SERVICES | Facility: HOSPITAL | Age: 79
LOS: 1 days | End: 2024-06-17
Payer: MEDICARE

## 2024-06-17 DIAGNOSIS — Z00.00 ENCOUNTER FOR GENERAL ADULT MEDICAL EXAMINATION WITHOUT ABNORMAL FINDINGS: ICD-10-CM

## 2024-06-17 PROCEDURE — C8001: CPT

## 2024-06-17 PROCEDURE — 76377 3D RENDER W/INTRP POSTPROCES: CPT | Mod: 26

## 2024-06-18 ENCOUNTER — APPOINTMENT (OUTPATIENT)
Dept: UROLOGY | Facility: CLINIC | Age: 79
End: 2024-06-18

## 2024-06-19 ENCOUNTER — APPOINTMENT (OUTPATIENT)
Dept: CARDIOLOGY | Facility: CLINIC | Age: 79
End: 2024-06-19
Payer: COMMERCIAL

## 2024-06-19 PROCEDURE — 78452 HT MUSCLE IMAGE SPECT MULT: CPT

## 2024-06-19 PROCEDURE — 93015 CV STRESS TEST SUPVJ I&R: CPT

## 2024-06-19 PROCEDURE — 93306 TTE W/DOPPLER COMPLETE: CPT

## 2024-06-19 PROCEDURE — A9500: CPT

## 2024-06-20 ENCOUNTER — APPOINTMENT (OUTPATIENT)
Dept: UROLOGY | Facility: CLINIC | Age: 79
End: 2024-06-20
Payer: MEDICARE

## 2024-06-20 ENCOUNTER — OUTPATIENT (OUTPATIENT)
Dept: OUTPATIENT SERVICES | Facility: HOSPITAL | Age: 79
LOS: 1 days | End: 2024-06-20

## 2024-06-20 DIAGNOSIS — R35.0 FREQUENCY OF MICTURITION: ICD-10-CM

## 2024-06-20 DIAGNOSIS — R93.5 ABNORMAL FINDINGS ON DIAGNOSTIC IMAGING OF OTHER ABDOMINAL REGIONS, INCLUDING RETROPERITONEUM: ICD-10-CM

## 2024-06-20 DIAGNOSIS — R97.20 ELEVATED PROSTATE, SPECIFIC ANTIGEN [PSA]: ICD-10-CM

## 2024-06-20 PROCEDURE — 76999F: CUSTOM | Mod: 26

## 2024-06-20 PROCEDURE — 76999 ECHO EXAMINATION PROCEDURE: CPT

## 2024-06-20 PROCEDURE — 55700: CPT

## 2024-06-21 PROBLEM — R93.5 ABNORMAL MRI, PELVIS: Status: ACTIVE | Noted: 2024-06-12

## 2024-06-21 PROBLEM — R97.20 ELEVATED PSA: Status: ACTIVE | Noted: 2024-05-14

## 2024-06-26 ENCOUNTER — APPOINTMENT (OUTPATIENT)
Dept: INFECTIOUS DISEASE | Facility: CLINIC | Age: 79
End: 2024-06-26

## 2024-06-26 ENCOUNTER — NON-APPOINTMENT (OUTPATIENT)
Age: 79
End: 2024-06-26

## 2024-06-26 LAB — PROSTATE BIOPSY: NORMAL

## 2024-06-27 ENCOUNTER — NON-APPOINTMENT (OUTPATIENT)
Age: 79
End: 2024-06-27

## 2024-07-02 ENCOUNTER — APPOINTMENT (OUTPATIENT)
Dept: PULMONOLOGY | Facility: CLINIC | Age: 79
End: 2024-07-02
Payer: MEDICARE

## 2024-07-02 VITALS
SYSTOLIC BLOOD PRESSURE: 123 MMHG | TEMPERATURE: 98.5 F | DIASTOLIC BLOOD PRESSURE: 69 MMHG | HEART RATE: 69 BPM | OXYGEN SATURATION: 99 %

## 2024-07-02 DIAGNOSIS — Z01.818 ENCOUNTER FOR OTHER PREPROCEDURAL EXAMINATION: ICD-10-CM

## 2024-07-02 DIAGNOSIS — J90 PLEURAL EFFUSION, NOT ELSEWHERE CLASSIFIED: ICD-10-CM

## 2024-07-02 PROCEDURE — 99205 OFFICE O/P NEW HI 60 MIN: CPT

## 2024-07-23 ENCOUNTER — APPOINTMENT (OUTPATIENT)
Dept: NEPHROLOGY | Facility: CLINIC | Age: 79
End: 2024-07-23
Payer: MEDICARE

## 2024-07-23 ENCOUNTER — NON-APPOINTMENT (OUTPATIENT)
Age: 79
End: 2024-07-23

## 2024-07-23 VITALS
BODY MASS INDEX: 23.49 KG/M2 | HEIGHT: 65 IN | DIASTOLIC BLOOD PRESSURE: 62 MMHG | TEMPERATURE: 98.2 F | OXYGEN SATURATION: 97 % | WEIGHT: 141 LBS | SYSTOLIC BLOOD PRESSURE: 136 MMHG | HEART RATE: 86 BPM

## 2024-07-23 PROCEDURE — G2211 COMPLEX E/M VISIT ADD ON: CPT

## 2024-07-23 PROCEDURE — 99214 OFFICE O/P EST MOD 30 MIN: CPT

## 2024-07-23 NOTE — HISTORY OF PRESENT ILLNESS
[FreeTextEntry1] : 80 yo man PKD, CKD Hx Htn, HLD Was told 10 years ago of PKD Over the past 2 years the SCr yolette from 2.9 to 5.3 He has no uremic sxs There is no FH of kidney dz   no FH PKD IS IN PROCESS OF TRANSPLANT EVALUATION Still no uremic sxs

## 2024-07-23 NOTE — ASSESSMENT
[FreeTextEntry1] : 80 yo man PKD, CKD Hx Htn, HLD Was told 10 years ago of PKD Over the past 2 years the SCr yolette from 2.9 to 4.2 He has no uremic sxs There is no FH of kidney dz   no FH PKD IS IN PROCESS OF TRANSPLANT EVALUATION ----- PKD   Last SCr over 5 but not uremic    Check labs today   No uremic sxs, no immediate need for RRT   He wants a living donor transplant / has a donor/ workup in progress   No immediate need for dialysis, may need prior to transplant  Htn   BP much better  Anemia   OK at present, may start KATYA is still anemic  The total time of preparation for this visit, the visit itself and writing the note was 35 minutes

## 2024-07-25 ENCOUNTER — NON-APPOINTMENT (OUTPATIENT)
Age: 79
End: 2024-07-25

## 2024-07-25 LAB
ALBUMIN SERPL ELPH-MCNC: 4.7 G/DL
ALP BLD-CCNC: 80 U/L
ALT SERPL-CCNC: 13 U/L
ANION GAP SERPL CALC-SCNC: 21 MMOL/L
AST SERPL-CCNC: 15 U/L
BILIRUB SERPL-MCNC: 0.2 MG/DL
BUN SERPL-MCNC: 62 MG/DL
CALCIUM SERPL-MCNC: 7.8 MG/DL
CHLORIDE SERPL-SCNC: 100 MMOL/L
CO2 SERPL-SCNC: 24 MMOL/L
CREAT SERPL-MCNC: 5.17 MG/DL
EGFR: 11 ML/MIN/1.73M2
GLUCOSE SERPL-MCNC: 130 MG/DL
HCT VFR BLD CALC: 26 %
HGB BLD-MCNC: 8.5 G/DL
MCHC RBC-ENTMCNC: 31.5 PG
MCHC RBC-ENTMCNC: 32.7 GM/DL
MCV RBC AUTO: 96.3 FL
PLATELET # BLD AUTO: 295 K/UL
POTASSIUM SERPL-SCNC: 4 MMOL/L
PROT SERPL-MCNC: 7.3 G/DL
RBC # BLD: 2.7 M/UL
RBC # FLD: 14.6 %
SODIUM SERPL-SCNC: 145 MMOL/L
WBC # FLD AUTO: 7.29 K/UL

## 2024-08-07 ENCOUNTER — APPOINTMENT (OUTPATIENT)
Dept: INFECTIOUS DISEASE | Facility: CLINIC | Age: 79
End: 2024-08-07

## 2024-08-07 PROBLEM — Z28.21 VACCINATION REFUSED BY PATIENT: Status: ACTIVE | Noted: 2024-08-07

## 2024-08-07 PROCEDURE — 99204 OFFICE O/P NEW MOD 45 MIN: CPT

## 2024-08-07 NOTE — END OF VISIT
[] : Fellow [FreeTextEntry3] : Patient seen and examined with Dr chandler I agree with his interval history exam and plans as noted above Patient undergoing renal transplant evaluation for donor directed tx., will send missing labs today No contra indicaitons to listing from an Id perspective

## 2024-08-07 NOTE — PHYSICAL EXAM
[General Appearance - Alert] : alert [General Appearance - In No Acute Distress] : in no acute distress [General Appearance - Well Nourished] : well nourished [General Appearance - Well-Appearing] : healthy appearing [Sclera] : the sclera and conjunctiva were normal [PERRL With Normal Accommodation] : pupils were equal in size, round, reactive to light [Outer Ear] : the ears and nose were normal in appearance [Both Tympanic Membranes Were Examined] : both tympanic membranes were normal [Oropharynx] : the oropharynx was normal with no thrush [Neck Appearance] : the appearance of the neck was normal [Neck Cervical Mass (___cm)] : no neck mass was observed [Jugular Venous Distention Increased] : there was no jugular-venous distention [Respiration, Rhythm And Depth] : normal respiratory rhythm and effort [Exaggerated Use Of Accessory Muscles For Inspiration] : no accessory muscle use [Auscultation Breath Sounds / Voice Sounds] : lungs were clear to auscultation bilaterally [Heart Rate And Rhythm] : heart rate was normal and rhythm regular [Heart Sounds] : normal S1 and S2 [Heart Sounds Gallop] : no gallops [Murmurs] : no murmurs [Heart Sounds Pericardial Friction Rub] : no pericardial rub [Bowel Sounds] : normal bowel sounds [Abdomen Soft] : soft [Abdomen Tenderness] : non-tender [Abdomen Mass (___ Cm)] : no abdominal mass palpated [Cervical Lymph Nodes Enlarged Posterior Bilaterally] : posterior cervical [Cervical Lymph Nodes Enlarged Anterior Bilaterally] : anterior cervical [Supraclavicular Lymph Nodes Enlarged Bilaterally] : supraclavicular [Musculoskeletal - Swelling] : no joint swelling [Nail Clubbing] : no clubbing  or cyanosis of the fingernails [Motor Tone] : muscle strength and tone were normal [Skin Color & Pigmentation] : normal skin color and pigmentation [] : no rash [Oriented To Time, Place, And Person] : oriented to person, place, and time [Affect] : the affect was normal [FreeTextEntry1] : 1-2+ edema in b/l lower extremities

## 2024-08-07 NOTE — ASSESSMENT
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 Patient found to be hepatitis B core antibody positive  *Pre-Renal transplant evaluation - patient would benefit from hepatitis A vaccine, refused  - patient would benefit from Prevnar 20 vaccine, refused  - sent MMR titers - sent strongyloides serology  *Hepatitis B core antibody positive - sent Hepatitis B PCR viral load to see if patient requires treatment. If patient is positive and receives transplant he would be at a small risk for activation and will follow standard hep B cab+ protocol  *HCM - patient refusing   Follow up: Dr. Herbert Camilo in 2 months.

## 2024-08-13 ENCOUNTER — APPOINTMENT (OUTPATIENT)
Dept: UROLOGY | Facility: CLINIC | Age: 79
End: 2024-08-13

## 2024-08-27 ENCOUNTER — NON-APPOINTMENT (OUTPATIENT)
Age: 79
End: 2024-08-27

## 2024-08-28 DIAGNOSIS — Z01.818 ENCOUNTER FOR OTHER PREPROCEDURAL EXAMINATION: ICD-10-CM

## 2024-09-04 ENCOUNTER — NON-APPOINTMENT (OUTPATIENT)
Age: 79
End: 2024-09-04

## 2024-09-05 ENCOUNTER — APPOINTMENT (OUTPATIENT)
Dept: TRANSPLANT | Facility: CLINIC | Age: 79
End: 2024-09-05

## 2024-09-16 ENCOUNTER — APPOINTMENT (OUTPATIENT)
Dept: PULMONOLOGY | Facility: CLINIC | Age: 79
End: 2024-09-16
Payer: MEDICARE

## 2024-09-16 VITALS — HEIGHT: 63.5 IN | BODY MASS INDEX: 24.32 KG/M2 | WEIGHT: 139 LBS

## 2024-09-16 VITALS
RESPIRATION RATE: 16 BRPM | OXYGEN SATURATION: 97 % | SYSTOLIC BLOOD PRESSURE: 132 MMHG | HEART RATE: 89 BPM | DIASTOLIC BLOOD PRESSURE: 60 MMHG

## 2024-09-16 DIAGNOSIS — J90 PLEURAL EFFUSION, NOT ELSEWHERE CLASSIFIED: ICD-10-CM

## 2024-09-16 PROCEDURE — 94727 GAS DIL/WSHOT DETER LNG VOL: CPT

## 2024-09-16 PROCEDURE — 85018 HEMOGLOBIN: CPT | Mod: QW

## 2024-09-16 PROCEDURE — 99214 OFFICE O/P EST MOD 30 MIN: CPT | Mod: 25

## 2024-09-16 PROCEDURE — 94010 BREATHING CAPACITY TEST: CPT

## 2024-09-16 PROCEDURE — 94729 DIFFUSING CAPACITY: CPT

## 2024-09-16 NOTE — HISTORY OF PRESENT ILLNESS
[TextBox_4] : 80 yo gentleman here for evaluation of abnormal Chest CT prior to consideration of renal transplantation from known donor Underlyin Polycystic Renal disease Hx of Basal; Cell skin Hepatitis B Ag HT on amlodipine, labetolol, allopurinal, rosuvastatin recent cardaic evaluation reportedly negative  Non smoker with NO previous history of pulmnary disease or exposures No asthma, allergies, chest pain, traum, sputum porduction Recent multiple prostate biopsies negative for Cancer  Last CXR available was fo=rom 2010--completely clear Sent for CT of chest and abdomen by Dr Koenig in May preop  Reviewed films extensively with patient and wife 4 mm Left apical nodule, 3 mm RLLL nodule, barely imaged Cor Trace pericardial effusion Pleura--bilateral pleural effusions R>>L, rest of pleura appears smooth.  Interim: Patient was supposed to get repeat CT chest in August but forgot to do it He returns today for PFTs  No dyspnea or wheezing noted, mild SOB with exertion and fatigue  PFTs today: Normal volumes and slightly decreased DLCO to 68% Spirometry does not suggest obstructive lung disease, small decrease in midexpiratory flow

## 2024-09-16 NOTE — ASSESSMENT
[FreeTextEntry1] : 78 yo gentleman here for evaluation of abnormal Chest CT prior to consideration of renal transplantation from known donor Underlyin Polycystic Renal disease Hx of Basal; Cell skin Hepatitis B Ag HT on amlodipine, labetolol, allopurinal, rosuvastatin recent cardaic evaluation reportedly negative  Non smoker with NO previous history of pulmnary disease or exposures No asthma, allergies, chest pain, traum, sputum porduction Recent multiple prostate biopsies negative for Cancer  Last CXR available was fo=rom 2010--completely clear Sent for CT of chest and abdomen by Dr Koenig in May preop  Reviewed films extensively with patient and wife 4 mm Left apical nodule, 3 mm RLLL nodule, barely imaged Cor Trace pericardial effusion Pleura--bilateral pleural effusions R>>L, rest of pleura appears smooth.  Interim: Patient was supposed to get repeat CT chest in August but forgot to do it He returns today for PFTs  No dyspnea or wheezing noted, mild SOB with exertion and fatigue  PFTs today: Normal volumes and slightly decreased DLCO to 68% Spirometry does not suggest obstructive lung disease, small decrease in midexpiratory flow  Imp  78 yo man withpolycystic renal disease and required renal transplantation Pulmonary nodules and effusions noted on CT in May---still require f/u scanning tho PE does not suggest larger effusions PFTs appear to be normal and should not interfere with operative risk  Note however HgB 7.4 which is down from 8.5 in July Refer back to Renal urgently for consideration of Epo to increase HbB as was originally planned  Patient with have telephoen discussion after CT is done to discuss results

## 2024-09-20 ENCOUNTER — APPOINTMENT (OUTPATIENT)
Dept: NEPHROLOGY | Facility: CLINIC | Age: 79
End: 2024-09-20
Payer: MEDICARE

## 2024-09-20 DIAGNOSIS — D63.1 CHRONIC KIDNEY DISEASE, STAGE 5: ICD-10-CM

## 2024-09-20 DIAGNOSIS — N18.5 CHRONIC KIDNEY DISEASE, STAGE 5: ICD-10-CM

## 2024-09-20 PROCEDURE — 96372 THER/PROPH/DIAG INJ SC/IM: CPT

## 2024-09-20 RX ORDER — DARBEPOETIN ALFA 60 UG/ML
60 SOLUTION INTRAVENOUS; SUBCUTANEOUS
Qty: 0 | Refills: 0 | Status: COMPLETED | OUTPATIENT
Start: 2024-09-20

## 2024-09-20 RX ADMIN — DARBEPOETIN ALFA 1 MCG/ML: 60 SOLUTION INTRAVENOUS; SUBCUTANEOUS at 00:00

## 2024-09-23 LAB
FERRITIN SERPL-MCNC: 312 NG/ML
HCT VFR BLD CALC: 24.8 %
HGB BLD-MCNC: 8.2 G/DL
IRON SATN MFR SERPL: 6 %
IRON SERPL-MCNC: 18 UG/DL
MCHC RBC-ENTMCNC: 32.7 PG
MCHC RBC-ENTMCNC: 33.1 GM/DL
MCV RBC AUTO: 98.8 FL
PLATELET # BLD AUTO: 344 K/UL
RBC # BLD: 2.51 M/UL
RBC # FLD: 13.3 %
TIBC SERPL-MCNC: 325 UG/DL
UIBC SERPL-MCNC: 307 UG/DL
WBC # FLD AUTO: 10.5 K/UL

## 2024-10-02 ENCOUNTER — APPOINTMENT (OUTPATIENT)
Dept: CT IMAGING | Facility: CLINIC | Age: 79
End: 2024-10-02

## 2024-10-02 ENCOUNTER — OUTPATIENT (OUTPATIENT)
Dept: OUTPATIENT SERVICES | Facility: HOSPITAL | Age: 79
LOS: 1 days | End: 2024-10-02

## 2024-10-02 DIAGNOSIS — J90 PLEURAL EFFUSION, NOT ELSEWHERE CLASSIFIED: ICD-10-CM

## 2024-10-02 PROCEDURE — 71250 CT THORAX DX C-: CPT | Mod: 26

## 2024-10-04 ENCOUNTER — APPOINTMENT (OUTPATIENT)
Dept: NEPHROLOGY | Facility: CLINIC | Age: 79
End: 2024-10-04
Payer: MEDICARE

## 2024-10-04 ENCOUNTER — APPOINTMENT (OUTPATIENT)
Dept: TRANSPLANT | Facility: CLINIC | Age: 79
End: 2024-10-04

## 2024-10-04 VITALS
SYSTOLIC BLOOD PRESSURE: 138 MMHG | DIASTOLIC BLOOD PRESSURE: 66 MMHG | WEIGHT: 137 LBS | OXYGEN SATURATION: 99 % | BODY MASS INDEX: 23.97 KG/M2 | HEIGHT: 63.5 IN | HEART RATE: 78 BPM | TEMPERATURE: 97.2 F

## 2024-10-04 PROCEDURE — G2211 COMPLEX E/M VISIT ADD ON: CPT

## 2024-10-04 PROCEDURE — 99214 OFFICE O/P EST MOD 30 MIN: CPT | Mod: 25

## 2024-10-04 PROCEDURE — 96372 THER/PROPH/DIAG INJ SC/IM: CPT

## 2024-10-04 RX ORDER — DARBEPOETIN ALFA 60 UG/ML
60 SOLUTION INTRAVENOUS; SUBCUTANEOUS
Qty: 0 | Refills: 0 | Status: COMPLETED | OUTPATIENT
Start: 2024-10-04

## 2024-10-04 RX ADMIN — DARBEPOETIN ALFA 0 MCG/ML: 60 SOLUTION INTRAVENOUS; SUBCUTANEOUS at 00:00

## 2024-10-04 NOTE — ASSESSMENT
[FreeTextEntry1] : 78 yo man PKD, CKD Hx Htn, HLD Was told 10 years ago of PKD Over the past 2 years the SCr yolette from 2.9 to 5.3 He has no uremic sxs There is no FH of kidney dz   no FH PKD IS IN PROCESS OF TRANSPLANT EVALUATION Still no uremic sxs     occ nausea ----- PKD   Last SCr over 5 but not uremic    Check labs today   No uremic sxs, no immediate need for RRT   He wants a living donor transplant / has a donor/ workup in progress  Stable not uremic  Anemia   Aranesp 60 mcg given today  The total time of preparation for this visit, the visit itself and writing the note was 33 minutes

## 2024-10-04 NOTE — HISTORY OF PRESENT ILLNESS
[FreeTextEntry1] : 80 yo man PKD, CKD Hx Htn, HLD Was told 10 years ago of PKD Over the past 2 years the SCr yolette from 2.9 to 5.3 He has no uremic sxs There is no FH of kidney dz   no FH PKD IS IN PROCESS OF TRANSPLANT EVALUATION Still no uremic sxs     occ nausea

## 2024-10-07 LAB
ALBUMIN SERPL ELPH-MCNC: 4.7 G/DL
ALP BLD-CCNC: 78 U/L
ALT SERPL-CCNC: 9 U/L
ANION GAP SERPL CALC-SCNC: 19 MMOL/L
AST SERPL-CCNC: 11 U/L
BILIRUB SERPL-MCNC: 0.3 MG/DL
BUN SERPL-MCNC: 63 MG/DL
CALCIUM SERPL-MCNC: 9.5 MG/DL
CHLORIDE SERPL-SCNC: 96 MMOL/L
CO2 SERPL-SCNC: 25 MMOL/L
CREAT SERPL-MCNC: 5.07 MG/DL
EGFR: 11 ML/MIN/1.73M2
GLUCOSE SERPL-MCNC: 149 MG/DL
HCT VFR BLD CALC: 27.1 %
HGB BLD-MCNC: 9.1 G/DL
MCHC RBC-ENTMCNC: 31.7 PG
MCHC RBC-ENTMCNC: 33.6 GM/DL
MCV RBC AUTO: 94.4 FL
PLATELET # BLD AUTO: 381 K/UL
POTASSIUM SERPL-SCNC: 4.1 MMOL/L
PROT SERPL-MCNC: 7.7 G/DL
RBC # BLD: 2.87 M/UL
RBC # BLD: 2.87 M/UL
RBC # FLD: 13.2 %
RETICS # AUTO: 1.4 %
RETICS AGGREG/RBC NFR: 40.8 K/UL
SODIUM SERPL-SCNC: 140 MMOL/L
WBC # FLD AUTO: 9.86 K/UL

## 2024-10-09 ENCOUNTER — APPOINTMENT (OUTPATIENT)
Dept: INFECTIOUS DISEASE | Facility: CLINIC | Age: 79
End: 2024-10-09

## 2024-10-09 VITALS
WEIGHT: 138 LBS | HEIGHT: 63.5 IN | TEMPERATURE: 97.6 F | HEART RATE: 87 BPM | RESPIRATION RATE: 16 BRPM | SYSTOLIC BLOOD PRESSURE: 125 MMHG | OXYGEN SATURATION: 97 % | DIASTOLIC BLOOD PRESSURE: 72 MMHG | BODY MASS INDEX: 24.15 KG/M2

## 2024-10-09 DIAGNOSIS — Z23 ENCOUNTER FOR IMMUNIZATION: ICD-10-CM

## 2024-10-09 DIAGNOSIS — Z01.818 ENCOUNTER FOR OTHER PREPROCEDURAL EXAMINATION: ICD-10-CM

## 2024-10-09 PROCEDURE — 90472 IMMUNIZATION ADMIN EACH ADD: CPT

## 2024-10-09 PROCEDURE — 90632 HEPA VACCINE ADULT IM: CPT | Mod: GY

## 2024-10-09 PROCEDURE — 90677 PCV20 VACCINE IM: CPT

## 2024-10-09 PROCEDURE — 99214 OFFICE O/P EST MOD 30 MIN: CPT | Mod: 25,GC

## 2024-10-09 PROCEDURE — G0009: CPT

## 2024-10-18 ENCOUNTER — APPOINTMENT (OUTPATIENT)
Dept: NEPHROLOGY | Facility: CLINIC | Age: 79
End: 2024-10-18
Payer: MEDICARE

## 2024-10-18 VITALS
WEIGHT: 136 LBS | OXYGEN SATURATION: 98 % | BODY MASS INDEX: 23.8 KG/M2 | SYSTOLIC BLOOD PRESSURE: 130 MMHG | TEMPERATURE: 97.2 F | HEART RATE: 74 BPM | DIASTOLIC BLOOD PRESSURE: 60 MMHG | HEIGHT: 63.5 IN

## 2024-10-18 PROCEDURE — 96372 THER/PROPH/DIAG INJ SC/IM: CPT

## 2024-10-18 RX ORDER — DARBEPOETIN ALFA 60 UG/ML
60 SOLUTION INTRAVENOUS; SUBCUTANEOUS
Qty: 1 | Refills: 0 | Status: ACTIVE | COMMUNITY
Start: 2024-10-18 | End: 1900-01-01

## 2024-10-26 ENCOUNTER — TRANSCRIPTION ENCOUNTER (OUTPATIENT)
Age: 79
End: 2024-10-26

## 2024-10-26 ENCOUNTER — INPATIENT (INPATIENT)
Facility: HOSPITAL | Age: 79
LOS: 0 days | Discharge: HOME | DRG: 699 | End: 2024-10-26
Attending: TRANSPLANT SURGERY | Admitting: TRANSPLANT SURGERY
Payer: COMMERCIAL

## 2024-10-26 VITALS
HEART RATE: 96 BPM | OXYGEN SATURATION: 95 % | WEIGHT: 131.84 LBS | RESPIRATION RATE: 18 BRPM | TEMPERATURE: 98 F | DIASTOLIC BLOOD PRESSURE: 81 MMHG | HEIGHT: 65 IN | SYSTOLIC BLOOD PRESSURE: 156 MMHG

## 2024-10-26 VITALS
DIASTOLIC BLOOD PRESSURE: 77 MMHG | SYSTOLIC BLOOD PRESSURE: 152 MMHG | HEART RATE: 85 BPM | TEMPERATURE: 98 F | RESPIRATION RATE: 18 BRPM | OXYGEN SATURATION: 96 %

## 2024-10-26 DIAGNOSIS — Z94.0 KIDNEY TRANSPLANT STATUS: ICD-10-CM

## 2024-10-26 LAB
ALBUMIN SERPL ELPH-MCNC: 4.9 G/DL — SIGNIFICANT CHANGE UP (ref 3.3–5)
ALP SERPL-CCNC: 75 U/L — SIGNIFICANT CHANGE UP (ref 40–120)
ALT FLD-CCNC: 12 U/L — SIGNIFICANT CHANGE UP (ref 10–45)
ANION GAP SERPL CALC-SCNC: 17 MMOL/L — SIGNIFICANT CHANGE UP (ref 5–17)
APPEARANCE UR: CLEAR — SIGNIFICANT CHANGE UP
APTT BLD: 30.3 SEC — SIGNIFICANT CHANGE UP (ref 24.5–35.6)
AST SERPL-CCNC: 12 U/L — SIGNIFICANT CHANGE UP (ref 10–40)
BACTERIA # UR AUTO: NEGATIVE /HPF — SIGNIFICANT CHANGE UP
BASOPHILS # BLD AUTO: 0.03 K/UL — SIGNIFICANT CHANGE UP (ref 0–0.2)
BASOPHILS NFR BLD AUTO: 0.2 % — SIGNIFICANT CHANGE UP (ref 0–2)
BILIRUB SERPL-MCNC: 0.3 MG/DL — SIGNIFICANT CHANGE UP (ref 0.2–1.2)
BILIRUB UR-MCNC: NEGATIVE — SIGNIFICANT CHANGE UP
BLD GP AB SCN SERPL QL: NEGATIVE — SIGNIFICANT CHANGE UP
BUN SERPL-MCNC: 68 MG/DL — HIGH (ref 7–23)
CALCIUM SERPL-MCNC: 9.2 MG/DL — SIGNIFICANT CHANGE UP (ref 8.4–10.5)
CAST: 0 /LPF — SIGNIFICANT CHANGE UP (ref 0–4)
CHLORIDE SERPL-SCNC: 96 MMOL/L — SIGNIFICANT CHANGE UP (ref 96–108)
CO2 SERPL-SCNC: 25 MMOL/L — SIGNIFICANT CHANGE UP (ref 22–31)
COLOR SPEC: YELLOW — SIGNIFICANT CHANGE UP
CREAT SERPL-MCNC: 4.99 MG/DL — HIGH (ref 0.5–1.3)
DIFF PNL FLD: NEGATIVE — SIGNIFICANT CHANGE UP
EGFR: 11 ML/MIN/1.73M2 — LOW
EOSINOPHIL # BLD AUTO: 0.11 K/UL — SIGNIFICANT CHANGE UP (ref 0–0.5)
EOSINOPHIL NFR BLD AUTO: 0.7 % — SIGNIFICANT CHANGE UP (ref 0–6)
GAS PNL BLDV: SIGNIFICANT CHANGE UP
GLUCOSE SERPL-MCNC: 118 MG/DL — HIGH (ref 70–99)
GLUCOSE UR QL: 100 MG/DL
HBV CORE AB SER-ACNC: REACTIVE
HBV SURFACE AB SER-ACNC: 286.4 MIU/ML — SIGNIFICANT CHANGE UP
HBV SURFACE AG SER-ACNC: SIGNIFICANT CHANGE UP
HCT VFR BLD CALC: 30.1 % — LOW (ref 39–50)
HCV AB S/CO SERPL IA: 0.05 S/CO — SIGNIFICANT CHANGE UP
HCV AB SERPL-IMP: SIGNIFICANT CHANGE UP
HCV RNA SPEC NAA+PROBE-LOG IU: SIGNIFICANT CHANGE UP
HCV RNA SPEC NAA+PROBE-LOG IU: SIGNIFICANT CHANGE UP LOGIU/ML
HGB BLD-MCNC: 10 G/DL — LOW (ref 13–17)
HIV 1+2 AB+HIV1 P24 AG SERPL QL IA: SIGNIFICANT CHANGE UP
IMM GRANULOCYTES NFR BLD AUTO: 0.7 % — SIGNIFICANT CHANGE UP (ref 0–0.9)
INR BLD: 0.9 RATIO — SIGNIFICANT CHANGE UP (ref 0.85–1.16)
KETONES UR-MCNC: NEGATIVE MG/DL — SIGNIFICANT CHANGE UP
LEUKOCYTE ESTERASE UR-ACNC: NEGATIVE — SIGNIFICANT CHANGE UP
LYMPHOCYTES # BLD AUTO: 1.62 K/UL — SIGNIFICANT CHANGE UP (ref 1–3.3)
LYMPHOCYTES # BLD AUTO: 10.5 % — LOW (ref 13–44)
MAGNESIUM SERPL-MCNC: 1.9 MG/DL — SIGNIFICANT CHANGE UP (ref 1.6–2.6)
MCHC RBC-ENTMCNC: 30.7 PG — SIGNIFICANT CHANGE UP (ref 27–34)
MCHC RBC-ENTMCNC: 33.2 GM/DL — SIGNIFICANT CHANGE UP (ref 32–36)
MCV RBC AUTO: 92.3 FL — SIGNIFICANT CHANGE UP (ref 80–100)
MONOCYTES # BLD AUTO: 0.87 K/UL — SIGNIFICANT CHANGE UP (ref 0–0.9)
MONOCYTES NFR BLD AUTO: 5.7 % — SIGNIFICANT CHANGE UP (ref 2–14)
NEUTROPHILS # BLD AUTO: 12.62 K/UL — HIGH (ref 1.8–7.4)
NEUTROPHILS NFR BLD AUTO: 82.2 % — HIGH (ref 43–77)
NITRITE UR-MCNC: NEGATIVE — SIGNIFICANT CHANGE UP
NRBC # BLD: 0 /100 WBCS — SIGNIFICANT CHANGE UP (ref 0–0)
PH UR: >=9 (ref 5–8)
PHOSPHATE SERPL-MCNC: 3.5 MG/DL — SIGNIFICANT CHANGE UP (ref 2.5–4.5)
PLATELET # BLD AUTO: 379 K/UL — SIGNIFICANT CHANGE UP (ref 150–400)
POTASSIUM SERPL-MCNC: 3.8 MMOL/L — SIGNIFICANT CHANGE UP (ref 3.5–5.3)
POTASSIUM SERPL-SCNC: 3.8 MMOL/L — SIGNIFICANT CHANGE UP (ref 3.5–5.3)
PROT SERPL-MCNC: 8.5 G/DL — HIGH (ref 6–8.3)
PROT UR-MCNC: 100 MG/DL
PROTHROM AB SERPL-ACNC: 10.4 SEC — SIGNIFICANT CHANGE UP (ref 9.9–13.4)
RBC # BLD: 3.26 M/UL — LOW (ref 4.2–5.8)
RBC # FLD: 13.2 % — SIGNIFICANT CHANGE UP (ref 10.3–14.5)
RBC CASTS # UR COMP ASSIST: 1 /HPF — SIGNIFICANT CHANGE UP (ref 0–4)
RH IG SCN BLD-IMP: POSITIVE — SIGNIFICANT CHANGE UP
SARS-COV-2 RNA SPEC QL NAA+PROBE: SIGNIFICANT CHANGE UP
SODIUM SERPL-SCNC: 138 MMOL/L — SIGNIFICANT CHANGE UP (ref 135–145)
SP GR SPEC: 1.01 — SIGNIFICANT CHANGE UP (ref 1–1.03)
SQUAMOUS # UR AUTO: 0 /HPF — SIGNIFICANT CHANGE UP (ref 0–5)
UROBILINOGEN FLD QL: 0.2 MG/DL — SIGNIFICANT CHANGE UP (ref 0.2–1)
WBC # BLD: 15.36 K/UL — HIGH (ref 3.8–10.5)
WBC # FLD AUTO: 15.36 K/UL — HIGH (ref 3.8–10.5)
WBC UR QL: 0 /HPF — SIGNIFICANT CHANGE UP (ref 0–5)

## 2024-10-26 PROCEDURE — 82330 ASSAY OF CALCIUM: CPT

## 2024-10-26 PROCEDURE — 84295 ASSAY OF SERUM SODIUM: CPT

## 2024-10-26 PROCEDURE — 86706 HEP B SURFACE ANTIBODY: CPT

## 2024-10-26 PROCEDURE — 81001 URINALYSIS AUTO W/SCOPE: CPT

## 2024-10-26 PROCEDURE — 86704 HEP B CORE ANTIBODY TOTAL: CPT

## 2024-10-26 PROCEDURE — 84100 ASSAY OF PHOSPHORUS: CPT

## 2024-10-26 PROCEDURE — 85018 HEMOGLOBIN: CPT

## 2024-10-26 PROCEDURE — 87340 HEPATITIS B SURFACE AG IA: CPT

## 2024-10-26 PROCEDURE — 93005 ELECTROCARDIOGRAM TRACING: CPT

## 2024-10-26 PROCEDURE — 82947 ASSAY GLUCOSE BLOOD QUANT: CPT

## 2024-10-26 PROCEDURE — 87086 URINE CULTURE/COLONY COUNT: CPT

## 2024-10-26 PROCEDURE — 85014 HEMATOCRIT: CPT

## 2024-10-26 PROCEDURE — 86850 RBC ANTIBODY SCREEN: CPT

## 2024-10-26 PROCEDURE — 71045 X-RAY EXAM CHEST 1 VIEW: CPT

## 2024-10-26 PROCEDURE — 87389 HIV-1 AG W/HIV-1&-2 AB AG IA: CPT

## 2024-10-26 PROCEDURE — 0225U NFCT DS DNA&RNA 21 SARSCOV2: CPT

## 2024-10-26 PROCEDURE — 85025 COMPLETE CBC W/AUTO DIFF WBC: CPT

## 2024-10-26 PROCEDURE — 83605 ASSAY OF LACTIC ACID: CPT

## 2024-10-26 PROCEDURE — 84132 ASSAY OF SERUM POTASSIUM: CPT

## 2024-10-26 PROCEDURE — 82435 ASSAY OF BLOOD CHLORIDE: CPT

## 2024-10-26 PROCEDURE — 87522 HEPATITIS C REVRS TRNSCRPJ: CPT

## 2024-10-26 PROCEDURE — 83735 ASSAY OF MAGNESIUM: CPT

## 2024-10-26 PROCEDURE — 85610 PROTHROMBIN TIME: CPT

## 2024-10-26 PROCEDURE — 86803 HEPATITIS C AB TEST: CPT

## 2024-10-26 PROCEDURE — 71045 X-RAY EXAM CHEST 1 VIEW: CPT | Mod: 26

## 2024-10-26 PROCEDURE — 86900 BLOOD TYPING SEROLOGIC ABO: CPT

## 2024-10-26 PROCEDURE — 87635 SARS-COV-2 COVID-19 AMP PRB: CPT

## 2024-10-26 PROCEDURE — 82803 BLOOD GASES ANY COMBINATION: CPT

## 2024-10-26 PROCEDURE — 85730 THROMBOPLASTIN TIME PARTIAL: CPT

## 2024-10-26 PROCEDURE — 80053 COMPREHEN METABOLIC PANEL: CPT

## 2024-10-26 PROCEDURE — 93010 ELECTROCARDIOGRAM REPORT: CPT

## 2024-10-26 PROCEDURE — 86901 BLOOD TYPING SEROLOGIC RH(D): CPT

## 2024-10-26 RX ORDER — ROSUVASTATIN CALCIUM 10 MG
1 TABLET ORAL
Refills: 0 | DISCHARGE

## 2024-10-26 RX ORDER — ALLOPURINOL 100 MG
1 TABLET ORAL
Refills: 0 | DISCHARGE

## 2024-10-26 RX ORDER — AMLODIPINE BESYLATE 10 MG
1 TABLET ORAL
Refills: 0 | DISCHARGE

## 2024-10-26 RX ORDER — AMLODIPINE BESYLATE 10 MG
0 TABLET ORAL
Refills: 0 | DISCHARGE

## 2024-10-26 RX ORDER — INFLUENZ VIR VAC TV P-SURF2003 15MCG/.5ML
0.5 SYRINGE (ML) INTRAMUSCULAR ONCE
Refills: 0 | Status: DISCONTINUED | OUTPATIENT
Start: 2024-10-26 | End: 2024-10-26

## 2024-10-26 RX ORDER — B-COMPLEX WITH VITAMIN C
1 VIAL (ML) INJECTION
Refills: 0 | DISCHARGE

## 2024-10-26 RX ORDER — LABETALOL HCL 200 MG
1 TABLET ORAL
Refills: 0 | DISCHARGE

## 2024-10-26 RX ORDER — FUROSEMIDE 40 MG
1 TABLET ORAL
Refills: 0 | DISCHARGE

## 2024-10-26 RX ORDER — CEFAZOLIN SODIUM 1 G
2000 VIAL (EA) INJECTION ONCE
Refills: 0 | Status: COMPLETED | OUTPATIENT
Start: 2024-10-26 | End: 2024-10-26

## 2024-10-26 NOTE — DISCHARGE NOTE PROVIDER - HOSPITAL COURSE
79M HTN, HLD, PKD for past ~12 years recently with increased Cr for past 6 months presents for DDRT. Patient has PSH of left open inguinal hernia repair without mesh. Reports has been monitoring kidney function without issue until February when it was noted his Cr increased to 5.11. He developed swelling in his legs and was prescribed lasix with improvement. Has never been on dialysis and been making urine.     Wife reports pt. drinks 3 glasses of wine a night, no history of withdrawal. Also uses marijuana occasionally.     Asymptomatic leukocytosis to 15 seen, no recent changes to health  UA negative so far  UCx sent  COVID negative, RVP pending  Vaccines first week of October, does not explain leukocytosis  Presuming viral given asymptomatic.    Will cancel case and discharge patient home    He will f/u with Dr. Rodgers and the Transplant Center per protocol      PAST MEDICAL & SURGICAL HISTORY:  Hypertension  Hyperlipidemia  Polycystic Kidney Disease  Open hernia repair    79M HTN, HLD, PKD for past ~12 years recently with increased Cr for past 6 months presents for DDRT. Patient has PSH of left open inguinal hernia repair without mesh. Reports has been monitoring kidney function without issue until February when it was noted his Cr increased to 5.11. He developed swelling in his legs and was prescribed lasix with improvement. Has never been on dialysis and been making urine.     Wife reports pt. drinks 3 glasses of wine a night, no history of withdrawal. Also uses marijuana occasionally.   Brought in for potential DDRT    Asymptomatic leukocytosis to 15 seen, rest of labs stable.  No recent changes to health  UA negative so far  UCx sent  COVID negative, RVP pending  Vaccines first week of October, does not explain leukocytosis  Presuming viral given asymptomatic.    Will cancel case and discharge patient home    He will f/u with Dr. Rodgers and the Transplant Center per protocol      PAST MEDICAL & SURGICAL HISTORY:  Hypertension  Hyperlipidemia  Polycystic Kidney Disease  Open hernia repair

## 2024-10-26 NOTE — DISCHARGE NOTE PROVIDER - NSDCFUSCHEDAPPT_GEN_ALL_CORE_FT
Ryan Rodgers  [facility] Physician [facility]  NEPHRO [address]  Scheduled Appointment: 11/04/2024

## 2024-10-26 NOTE — DISCHARGE NOTE NURSING/CASE MANAGEMENT/SOCIAL WORK - FINANCIAL ASSISTANCE
[facility] provides services at a reduced cost to those who are determined to be eligible through [facility]’s financial assistance program. Information regarding [facility]’s financial assistance program can be found by going to [email or web address] or by calling [phone].

## 2024-10-26 NOTE — DISCHARGE NOTE PROVIDER - CARE PROVIDER_API CALL
Ryan Rodgers  Nephrology  [address] [place] [address], [address]  [place] [address]  Phone: [phone]  Fax: [phone]  Follow Up Time:

## 2024-10-26 NOTE — DISCHARGE NOTE PROVIDER - NSDCCPCAREPLAN_GEN_ALL_CORE_FT
PRINCIPAL DISCHARGE DIAGNOSIS  Diagnosis: ESRD on dialysis  Assessment and Plan of Treatment: continue your care with Nephrologist and the Transplant Center  return or call with issues

## 2024-10-26 NOTE — DISCHARGE NOTE PROVIDER - NSDCMRMEDTOKEN_GEN_ALL_CORE_FT
allopurinol 300 mg oral tablet: 1 tab(s) orally once a day  AmLODIPine:   amLODIPine 10 mg oral tablet: 1 tab(s) orally once a day  furosemide 40 mg oral tablet: 1 tab(s) orally once a day  labetalol 100 mg oral tablet: 1 tab(s) orally 3 times a day  multivitamin: 1 each once a day  rosuvastatin 20 mg oral tablet: 1 tab(s) orally once a day

## 2024-10-26 NOTE — DISCHARGE NOTE PROVIDER - ATTENDING ATTESTATION STATEMENT
I have personally seen and examined the patient. I have collaborated with and supervised the 4 = No assist / stand by assistance

## 2024-10-26 NOTE — H&P ADULT - ASSESSMENT
79M HTN, HLD, PKD for past ~12 years recently with increased Cr for past 6 months, making urine, presents for DDRT.    Plan:  - NPO  - Pre-op labs, CXR, EKG  - Consent in chart     Transplant Surgery  [id number]

## 2024-10-26 NOTE — H&P ADULT - HISTORY OF PRESENT ILLNESS
HPI:  79M HTN, HLD, PKD for past ~12 years recently with increased Cr for past 6 months presents for DDRT. Patient has PSH of left open inguinal hernia repair without mesh. Reports has been monitoring kidney function without issue until February when it was noted his Cr increased to 5.11. He developed swelling in his legs and was prescribed lasix with improvement. Has never been on dialysis and been making urine. Denies other complaints, no headaches, fevers, dizziness, chest pain, SOB, nausea/vomiting, abdominal pain. Walks at home and performs DALYs without assistance. Wife reports pt. drinks 3 glasses of wine a night, no history of withdrawal. Also uses marijuana occasionally.       PAST MEDICAL & SURGICAL HISTORY:  Hypertension  Hyperlipidemia  Polycystic Kidney Disease  Open hernia repair       MEDICATIONS  (STANDING):  influenza  Vaccine (HIGH DOSE) 0.5 milliLiter(s) IntraMuscular once    MEDICATIONS  (PRN):      Allergies    No Known Allergies    Intolerances        SOCIAL HISTORY:  Drinks 3 glasses of wine a night  Occasional marijuana use             Physical Exam:  General: NAD, resting comfortably  HEENT: NC/AT, EOMI, normal hearing, no oral lesions, no LAD, neck supple  Pulmonary: normal resp effort, CTA-B  Cardiovascular: RRR  Abdominal: soft, ND/NT, no organomegaly, faint left sided inguinal scar   Extremities: WWP, normal strength, no clubbing/cyanosis, minimal edema   Neuro: A/O x 3, CNs II-XII grossly intact, normal sensation, no focal deficits  Pulses: palpable distal pulses    Vital Signs Last 24 Hrs  T(C): 36.7 (26 Oct 2024 06:33), Max: 36.7 (26 Oct 2024 06:33)  T(F): 98 (26 Oct 2024 06:33), Max: 98 (26 Oct 2024 06:33)  HR: 96 (26 Oct 2024 06:33) (96 - 96)  BP: 156/81 (26 Oct 2024 06:33) (156/81 - 156/81)  BP(mean): --  RR: 18 (26 Oct 2024 06:33) (18 - 18)  SpO2: 95% (26 Oct 2024 06:33) (95% - 95%)    Parameters below as of 26 Oct 2024 06:33  Patient On (Oxygen Delivery Method): room air        I&O's Summary          LABS:  Pending         CAPILLARY BLOOD GLUCOSE            Cultures:      RADIOLOGY & ADDITIONAL STUDIES:

## 2024-10-26 NOTE — DISCHARGE NOTE PROVIDER - DISCHARGE DATE
----- Message from Malik Long sent at 9/7/2023  7:55 AM CDT -----  Regarding: Ketosis  Contact: [phone]  How do you test for ketones?   26-Oct-2024

## 2024-10-26 NOTE — DISCHARGE NOTE NURSING/CASE MANAGEMENT/SOCIAL WORK - PATIENT PORTAL LINK FT
You can access the FollowMyHealth Patient Portal offered by [facility] by registering at the following website: [email or web address]. By joining [employer]’s FollowMyHealth portal, you will also be able to view your health information using other applications (apps) compatible with our system.

## 2024-10-27 LAB
CULTURE RESULTS: SIGNIFICANT CHANGE UP
SPECIMEN SOURCE: SIGNIFICANT CHANGE UP

## 2024-11-04 ENCOUNTER — NON-APPOINTMENT (OUTPATIENT)
Age: 79
End: 2024-11-04

## 2024-11-04 ENCOUNTER — APPOINTMENT (OUTPATIENT)
Dept: NEPHROLOGY | Facility: CLINIC | Age: 79
End: 2024-11-04
Payer: MEDICARE

## 2024-11-04 PROCEDURE — 96372 THER/PROPH/DIAG INJ SC/IM: CPT

## 2024-11-04 RX ORDER — DARBEPOETIN ALFA 40 UG/.4ML
40 INJECTION, SOLUTION INTRAVENOUS; SUBCUTANEOUS
Qty: 0 | Refills: 0 | Status: COMPLETED | OUTPATIENT
Start: 2024-11-04

## 2024-11-04 RX ADMIN — DARBEPOETIN ALFA 40 MCG/0.4ML: 40 INJECTION, SOLUTION INTRAVENOUS; SUBCUTANEOUS at 00:00

## 2024-11-05 LAB
ALBUMIN SERPL ELPH-MCNC: 4.3 G/DL
ALP BLD-CCNC: 76 U/L
ALT SERPL-CCNC: 5 U/L
ANION GAP SERPL CALC-SCNC: 17 MMOL/L
AST SERPL-CCNC: 10 U/L
BILIRUB SERPL-MCNC: 0.2 MG/DL
BUN SERPL-MCNC: 58 MG/DL
CALCIUM SERPL-MCNC: 9 MG/DL
CHLORIDE SERPL-SCNC: 95 MMOL/L
CO2 SERPL-SCNC: 31 MMOL/L
CREAT SERPL-MCNC: 4.93 MG/DL
EGFR: 11 ML/MIN/1.73M2
GLUCOSE SERPL-MCNC: 100 MG/DL
HCT VFR BLD CALC: 28.6 %
HGB BLD-MCNC: 9.6 G/DL
MCHC RBC-ENTMCNC: 31.4 PG
MCHC RBC-ENTMCNC: 33.6 G/DL
MCV RBC AUTO: 93.5 FL
PLATELET # BLD AUTO: 370 K/UL
POTASSIUM SERPL-SCNC: 4.1 MMOL/L
PROT SERPL-MCNC: 7.2 G/DL
RBC # BLD: 3.06 M/UL
RBC # FLD: 13.8 %
SODIUM SERPL-SCNC: 143 MMOL/L
WBC # FLD AUTO: 8.33 K/UL

## 2024-11-07 ENCOUNTER — NON-APPOINTMENT (OUTPATIENT)
Age: 79
End: 2024-11-07

## 2024-12-05 ENCOUNTER — APPOINTMENT (OUTPATIENT)
Dept: NEPHROLOGY | Facility: CLINIC | Age: 79
End: 2024-12-05

## 2024-12-05 ENCOUNTER — INPATIENT (INPATIENT)
Facility: HOSPITAL | Age: 79
LOS: 4 days | Discharge: HOME | DRG: 700 | End: 2024-12-10
Attending: TRANSPLANT SURGERY | Admitting: TRANSPLANT SURGERY
Payer: MEDICARE

## 2024-12-05 ENCOUNTER — TRANSCRIPTION ENCOUNTER (OUTPATIENT)
Age: 79
End: 2024-12-05

## 2024-12-05 ENCOUNTER — APPOINTMENT (OUTPATIENT)
Dept: TRANSPLANT | Facility: HOSPITAL | Age: 79
End: 2024-12-05

## 2024-12-05 VITALS
SYSTOLIC BLOOD PRESSURE: 152 MMHG | DIASTOLIC BLOOD PRESSURE: 71 MMHG | WEIGHT: 134.26 LBS | OXYGEN SATURATION: 99 % | RESPIRATION RATE: 18 BRPM | HEART RATE: 86 BPM | TEMPERATURE: 98 F | HEIGHT: 65 IN

## 2024-12-05 DIAGNOSIS — Z94.0 KIDNEY TRANSPLANT STATUS: ICD-10-CM

## 2024-12-05 LAB
ALBUMIN SERPL ELPH-MCNC: 4.7 G/DL — SIGNIFICANT CHANGE UP (ref 3.3–5)
ALP SERPL-CCNC: 71 U/L — SIGNIFICANT CHANGE UP (ref 40–120)
ALT FLD-CCNC: 10 U/L — SIGNIFICANT CHANGE UP (ref 10–45)
ANION GAP SERPL CALC-SCNC: 20 MMOL/L — HIGH (ref 5–17)
APTT BLD: 32.1 SEC — SIGNIFICANT CHANGE UP (ref 24.5–35.6)
AST SERPL-CCNC: 10 U/L — SIGNIFICANT CHANGE UP (ref 10–40)
BASOPHILS # BLD AUTO: 0.04 K/UL — SIGNIFICANT CHANGE UP (ref 0–0.2)
BASOPHILS NFR BLD AUTO: 0.5 % — SIGNIFICANT CHANGE UP (ref 0–2)
BILIRUB SERPL-MCNC: 0.3 MG/DL — SIGNIFICANT CHANGE UP (ref 0.2–1.2)
BLD GP AB SCN SERPL QL: NEGATIVE — SIGNIFICANT CHANGE UP
BUN SERPL-MCNC: 62 MG/DL — HIGH (ref 7–23)
CALCIUM SERPL-MCNC: 9.3 MG/DL — SIGNIFICANT CHANGE UP (ref 8.4–10.5)
CHLORIDE SERPL-SCNC: 97 MMOL/L — SIGNIFICANT CHANGE UP (ref 96–108)
CO2 SERPL-SCNC: 23 MMOL/L — SIGNIFICANT CHANGE UP (ref 22–31)
CREAT SERPL-MCNC: 4.9 MG/DL — HIGH (ref 0.5–1.3)
EGFR: 11 ML/MIN/1.73M2 — LOW
EOSINOPHIL # BLD AUTO: 0.32 K/UL — SIGNIFICANT CHANGE UP (ref 0–0.5)
EOSINOPHIL NFR BLD AUTO: 3.9 % — SIGNIFICANT CHANGE UP (ref 0–6)
GAS PNL BLDA: SIGNIFICANT CHANGE UP
GAS PNL BLDA: SIGNIFICANT CHANGE UP
GAS PNL BLDV: SIGNIFICANT CHANGE UP
GLUCOSE BLDC GLUCOMTR-MCNC: 103 MG/DL — HIGH (ref 70–99)
GLUCOSE SERPL-MCNC: 99 MG/DL — SIGNIFICANT CHANGE UP (ref 70–99)
HBV SURFACE AG SER-ACNC: SIGNIFICANT CHANGE UP
HCT VFR BLD CALC: 30.2 % — LOW (ref 39–50)
HCV AB S/CO SERPL IA: 0.04 S/CO — SIGNIFICANT CHANGE UP
HCV AB SERPL-IMP: SIGNIFICANT CHANGE UP
HGB BLD-MCNC: 10.4 G/DL — LOW (ref 13–17)
IMM GRANULOCYTES NFR BLD AUTO: 0.2 % — SIGNIFICANT CHANGE UP (ref 0–0.9)
INR BLD: 0.86 RATIO — SIGNIFICANT CHANGE UP (ref 0.85–1.16)
LYMPHOCYTES # BLD AUTO: 2.17 K/UL — SIGNIFICANT CHANGE UP (ref 1–3.3)
LYMPHOCYTES # BLD AUTO: 26.6 % — SIGNIFICANT CHANGE UP (ref 13–44)
MAGNESIUM SERPL-MCNC: 2.1 MG/DL — SIGNIFICANT CHANGE UP (ref 1.6–2.6)
MCHC RBC-ENTMCNC: 31.2 PG — SIGNIFICANT CHANGE UP (ref 27–34)
MCHC RBC-ENTMCNC: 34.4 G/DL — SIGNIFICANT CHANGE UP (ref 32–36)
MCV RBC AUTO: 90.7 FL — SIGNIFICANT CHANGE UP (ref 80–100)
MONOCYTES # BLD AUTO: 0.81 K/UL — SIGNIFICANT CHANGE UP (ref 0–0.9)
MONOCYTES NFR BLD AUTO: 9.9 % — SIGNIFICANT CHANGE UP (ref 2–14)
NEUTROPHILS # BLD AUTO: 4.79 K/UL — SIGNIFICANT CHANGE UP (ref 1.8–7.4)
NEUTROPHILS NFR BLD AUTO: 58.9 % — SIGNIFICANT CHANGE UP (ref 43–77)
NRBC # BLD: 0 /100 WBCS — SIGNIFICANT CHANGE UP (ref 0–0)
PHOSPHATE SERPL-MCNC: 3.9 MG/DL — SIGNIFICANT CHANGE UP (ref 2.5–4.5)
PLATELET # BLD AUTO: 286 K/UL — SIGNIFICANT CHANGE UP (ref 150–400)
POTASSIUM SERPL-MCNC: 3.8 MMOL/L — SIGNIFICANT CHANGE UP (ref 3.5–5.3)
POTASSIUM SERPL-SCNC: 3.8 MMOL/L — SIGNIFICANT CHANGE UP (ref 3.5–5.3)
PROT SERPL-MCNC: 8 G/DL — SIGNIFICANT CHANGE UP (ref 6–8.3)
PROTHROM AB SERPL-ACNC: 9.9 SEC — SIGNIFICANT CHANGE UP (ref 9.9–13.4)
RBC # BLD: 3.33 M/UL — LOW (ref 4.2–5.8)
RBC # FLD: 14.3 % — SIGNIFICANT CHANGE UP (ref 10.3–14.5)
RH IG SCN BLD-IMP: POSITIVE — SIGNIFICANT CHANGE UP
RH IG SCN BLD-IMP: POSITIVE — SIGNIFICANT CHANGE UP
SARS-COV-2 RNA SPEC QL NAA+PROBE: SIGNIFICANT CHANGE UP
SODIUM SERPL-SCNC: 140 MMOL/L — SIGNIFICANT CHANGE UP (ref 135–145)
WBC # BLD: 8.15 K/UL — SIGNIFICANT CHANGE UP (ref 3.8–10.5)
WBC # FLD AUTO: 8.15 K/UL — SIGNIFICANT CHANGE UP (ref 3.8–10.5)

## 2024-12-05 PROCEDURE — 71045 X-RAY EXAM CHEST 1 VIEW: CPT | Mod: 26

## 2024-12-05 PROCEDURE — 93010 ELECTROCARDIOGRAM REPORT: CPT | Mod: 76

## 2024-12-05 PROCEDURE — 50360 RNL ALTRNSPLJ W/O RCP NFRCT: CPT

## 2024-12-05 DEVICE — KIT A-LINE 1LUM 20G X 12CM SAFE KIT: Type: IMPLANTABLE DEVICE | Site: LEFT | Status: FUNCTIONAL

## 2024-12-05 DEVICE — STENT URET POLARIS ULTRA 5X12CM: Type: IMPLANTABLE DEVICE | Site: LEFT | Status: FUNCTIONAL

## 2024-12-05 DEVICE — SURGICEL 2 X 14": Type: IMPLANTABLE DEVICE | Site: LEFT | Status: FUNCTIONAL

## 2024-12-05 RX ORDER — INFLUENZA VIRUS VACCINE 15; 15; 15; 15 UG/.5ML; UG/.5ML; UG/.5ML; UG/.5ML
0.5 SUSPENSION INTRAMUSCULAR ONCE
Refills: 0 | Status: DISCONTINUED | OUTPATIENT
Start: 2024-12-05 | End: 2024-12-10

## 2024-12-05 RX ORDER — CEFAZOLIN SODIUM 10 G
2000 VIAL (EA) INJECTION ONCE
Refills: 0 | Status: COMPLETED | OUTPATIENT
Start: 2024-12-05 | End: 2024-12-05

## 2024-12-05 RX ORDER — SODIUM CHLORIDE 9 MG/ML
3 INJECTION, SOLUTION INTRAMUSCULAR; INTRAVENOUS; SUBCUTANEOUS EVERY 8 HOURS
Refills: 0 | Status: DISCONTINUED | OUTPATIENT
Start: 2024-12-05 | End: 2024-12-05

## 2024-12-05 NOTE — H&P ADULT - NS ATTEND AMEND GEN_ALL_CORE FT
Patient seen and examined. He is 79 with stage 5 CKD due to PKD. Labs reviewed and WBC is normal.  He is admitted for a DDKT.   Plan for Thymo induction and tac, MMF, and prednisone for maintenance.  Patient is marked. Consents are signed.

## 2024-12-05 NOTE — H&P ADULT - NSHPPHYSICALEXAM_GEN_ALL_CORE
T(C): 36.4 (12-05-24 @ 15:38), Max: 36.4 (12-05-24 @ 15:38)  HR: 86 (12-05-24 @ 15:38) (86 - 86)  BP: 152/71 (12-05-24 @ 15:38) (152/71 - 152/71)  RR: 18 (12-05-24 @ 15:38) (18 - 18)  SpO2: 99% (12-05-24 @ 15:38) (99% - 99%)    CONSTITUTIONAL: Well groomed, no apparent distress  EYES: PERRLA and symmetric, EOMI, No conjunctival or scleral injection, non-icteric  ENMT: Oral mucosa with moist membranes. Normal dentition; no pharyngeal injection or exudates   NECK: Supple, symmetric and without tracheal deviation   RESP: No respiratory distress, no use of accessory muscles; CTA b/l, no WRR  CV: RRR, +S1S2, no MRG; no JVD; no peripheral edema  GI: Soft, NT, ND, no rebound, no guarding; no palpable masses; no hepatosplenomegaly; no hernia palpated  LYMPH: No cervical LAD or tenderness; no axillary LAD or tenderness; no inguinal LAD or tenderness  MSK: Normal gait; No digital clubbing or cyanosis; examination of the (head/neck/spine/ribs/pelvis, RUE, LUE, RLE, LLE) without misalignment,     Normal ROM without pain, no spinal tenderness, normal muscle strength/tone  SKIN: No rashes or ulcers noted; no subcutaneous nodules or induration palpable  PSYCH: Appropriate insight/judgment; A+O x 3, mood and affect appropriate, recent/remote memory intact

## 2024-12-05 NOTE — H&P ADULT - HISTORY OF PRESENT ILLNESS
79 male with PMHx HTN, left inguinal hernia repair, HLD, PKD for past ~12 years recently with increased Cr for past 6 months presents for possible DDRT.     Patient states he is in his usual state of health. Recent admission 10/26/24 for possible kidney transplant offer however patient was noted to have leukocytosis with WBC 15.36 and case was cancelled. Infectious workup unrevealing and repeat WBC 8.33 on 11/4/24. No other recent hospital admissions or antibiotic use. Denies recent blood transfusions. Excellent exercise tolerance, able to walk 3 miles without stopping and without any chest pain or shortness of breath . Makes normal amount of urine at home. Denies other complaints, no headaches, fevers,  dizziness, chest pain, SOB, nausea/vomiting, abdominal pain.     6/19/24 Nuclear exercise stress test: Normal myocardial perfusion scan, with no evidence of infarction or inducible ischemia. Qualitative Perfusion: medium sized, mild defect(s) in the basal inferoseptal, inferior and basal inferolateral walls that are fixed, predominantly corrects with prone imaging suggestive of diaphragmatic attenuation artifact. The left ventricle is normal in function and moderately enlarged in size. The post stress left ventricular EF is 69 %. The stress end diastolic volume is 121 ml and systolic volume is 38 ml.Patient achieved 7 METS, which is consistent with good exercise capacity.The Duke Treadmill Score is 5.00, which is consistent with low risk (=5) for future cardiac events    ECHO 6/19/24: Left ventricular cavity is normal in size. Left ventricular systolic function is normal with an ejection fraction of 59 % by Smith's method of disks. Borderline left ventricular hypertrophy. The left atrium is moderately dilated. There is mild calcification of the mitral valve annulus. Moderate mitral regurgitation. Trileaflet aortic valve with normal systolic excursion. Mild aortic

## 2024-12-05 NOTE — H&P ADULT - ASSESSMENT
79 male with PMHx HTN, left inguinal hernia repair, HLD, PKD for past ~12 years recently with increased Cr for past 6 months presents for possible DDRT.     - Admit to Transplant Surgery under Dr. Atkinson  - Pre-op labs including CBC, CMP, Mg, Phos, PT/PTT, type and screen  - High risk labs  - CXR  - EKG  - COVID  - induction: Thymo 75 mg in OR per Dr. Atkinson  - OR 5pm

## 2024-12-06 LAB
ALBUMIN SERPL ELPH-MCNC: 3.5 G/DL — SIGNIFICANT CHANGE UP (ref 3.3–5)
ALBUMIN SERPL ELPH-MCNC: 3.6 G/DL — SIGNIFICANT CHANGE UP (ref 3.3–5)
ALP SERPL-CCNC: 49 U/L — SIGNIFICANT CHANGE UP (ref 40–120)
ALP SERPL-CCNC: 54 U/L — SIGNIFICANT CHANGE UP (ref 40–120)
ALT FLD-CCNC: 13 U/L — SIGNIFICANT CHANGE UP (ref 10–45)
ALT FLD-CCNC: 14 U/L — SIGNIFICANT CHANGE UP (ref 10–45)
ANION GAP SERPL CALC-SCNC: 25 MMOL/L — HIGH (ref 5–17)
ANION GAP SERPL CALC-SCNC: 25 MMOL/L — HIGH (ref 5–17)
APTT BLD: 26.1 SEC — SIGNIFICANT CHANGE UP (ref 24.5–35.6)
APTT BLD: 27.5 SEC — SIGNIFICANT CHANGE UP (ref 24.5–35.6)
AST SERPL-CCNC: 31 U/L — SIGNIFICANT CHANGE UP (ref 10–40)
AST SERPL-CCNC: 34 U/L — SIGNIFICANT CHANGE UP (ref 10–40)
BASOPHILS # BLD AUTO: 0 K/UL — SIGNIFICANT CHANGE UP (ref 0–0.2)
BASOPHILS # BLD AUTO: 0.02 K/UL — SIGNIFICANT CHANGE UP (ref 0–0.2)
BASOPHILS # BLD AUTO: 0.03 K/UL — SIGNIFICANT CHANGE UP (ref 0–0.2)
BASOPHILS NFR BLD AUTO: 0 % — SIGNIFICANT CHANGE UP (ref 0–2)
BASOPHILS NFR BLD AUTO: 0.1 % — SIGNIFICANT CHANGE UP (ref 0–2)
BASOPHILS NFR BLD AUTO: 0.1 % — SIGNIFICANT CHANGE UP (ref 0–2)
BILIRUB SERPL-MCNC: 0.2 MG/DL — SIGNIFICANT CHANGE UP (ref 0.2–1.2)
BILIRUB SERPL-MCNC: 0.2 MG/DL — SIGNIFICANT CHANGE UP (ref 0.2–1.2)
BUN SERPL-MCNC: 62 MG/DL — HIGH (ref 7–23)
BUN SERPL-MCNC: 62 MG/DL — HIGH (ref 7–23)
CALCIUM SERPL-MCNC: 7.9 MG/DL — LOW (ref 8.4–10.5)
CALCIUM SERPL-MCNC: 8.3 MG/DL — LOW (ref 8.4–10.5)
CHLORIDE SERPL-SCNC: 97 MMOL/L — SIGNIFICANT CHANGE UP (ref 96–108)
CHLORIDE SERPL-SCNC: 98 MMOL/L — SIGNIFICANT CHANGE UP (ref 96–108)
CO2 SERPL-SCNC: 18 MMOL/L — LOW (ref 22–31)
CO2 SERPL-SCNC: 18 MMOL/L — LOW (ref 22–31)
CREAT SERPL-MCNC: 4.58 MG/DL — HIGH (ref 0.5–1.3)
CREAT SERPL-MCNC: 4.82 MG/DL — HIGH (ref 0.5–1.3)
EGFR: 11 ML/MIN/1.73M2 — LOW
EGFR: 12 ML/MIN/1.73M2 — LOW
EOSINOPHIL # BLD AUTO: 0 K/UL — SIGNIFICANT CHANGE UP (ref 0–0.5)
EOSINOPHIL # BLD AUTO: 0.01 K/UL — SIGNIFICANT CHANGE UP (ref 0–0.5)
EOSINOPHIL # BLD AUTO: 0.01 K/UL — SIGNIFICANT CHANGE UP (ref 0–0.5)
EOSINOPHIL NFR BLD AUTO: 0 % — SIGNIFICANT CHANGE UP (ref 0–6)
GLUCOSE BLDC GLUCOMTR-MCNC: 211 MG/DL — HIGH (ref 70–99)
GLUCOSE BLDC GLUCOMTR-MCNC: 225 MG/DL — HIGH (ref 70–99)
GLUCOSE BLDC GLUCOMTR-MCNC: 225 MG/DL — HIGH (ref 70–99)
GLUCOSE BLDC GLUCOMTR-MCNC: 251 MG/DL — HIGH (ref 70–99)
GLUCOSE BLDC GLUCOMTR-MCNC: 253 MG/DL — HIGH (ref 70–99)
GLUCOSE SERPL-MCNC: 178 MG/DL — HIGH (ref 70–99)
GLUCOSE SERPL-MCNC: 202 MG/DL — HIGH (ref 70–99)
HBV CORE AB SER-ACNC: REACTIVE
HBV SURFACE AB SER-ACNC: 305.2 MIU/ML — SIGNIFICANT CHANGE UP
HCT VFR BLD CALC: 23.4 % — LOW (ref 39–50)
HCT VFR BLD CALC: 23.9 % — LOW (ref 39–50)
HCT VFR BLD CALC: 24.4 % — LOW (ref 39–50)
HCV RNA SPEC NAA+PROBE-LOG IU: SIGNIFICANT CHANGE UP
HCV RNA SPEC NAA+PROBE-LOG IU: SIGNIFICANT CHANGE UP LOGIU/ML
HGB BLD-MCNC: 7.7 G/DL — LOW (ref 13–17)
HGB BLD-MCNC: 8.1 G/DL — LOW (ref 13–17)
HGB BLD-MCNC: 8.2 G/DL — LOW (ref 13–17)
HIV 1+2 AB+HIV1 P24 AG SERPL QL IA: SIGNIFICANT CHANGE UP
IMM GRANULOCYTES NFR BLD AUTO: 0.6 % — SIGNIFICANT CHANGE UP (ref 0–0.9)
IMM GRANULOCYTES NFR BLD AUTO: 0.7 % — SIGNIFICANT CHANGE UP (ref 0–0.9)
INR BLD: 0.89 RATIO — SIGNIFICANT CHANGE UP (ref 0.85–1.16)
INR BLD: 0.95 RATIO — SIGNIFICANT CHANGE UP (ref 0.85–1.16)
LYMPHOCYTES # BLD AUTO: 0 % — LOW (ref 13–44)
LYMPHOCYTES # BLD AUTO: 0 K/UL — LOW (ref 1–3.3)
LYMPHOCYTES # BLD AUTO: 0.03 K/UL — LOW (ref 1–3.3)
LYMPHOCYTES # BLD AUTO: 0.04 K/UL — LOW (ref 1–3.3)
LYMPHOCYTES # BLD AUTO: 0.1 % — LOW (ref 13–44)
LYMPHOCYTES # BLD AUTO: 0.2 % — LOW (ref 13–44)
MAGNESIUM SERPL-MCNC: 2.1 MG/DL — SIGNIFICANT CHANGE UP (ref 1.6–2.6)
MAGNESIUM SERPL-MCNC: 2.1 MG/DL — SIGNIFICANT CHANGE UP (ref 1.6–2.6)
MANUAL SMEAR VERIFICATION: SIGNIFICANT CHANGE UP
MCHC RBC-ENTMCNC: 30.3 PG — SIGNIFICANT CHANGE UP (ref 27–34)
MCHC RBC-ENTMCNC: 31.1 PG — SIGNIFICANT CHANGE UP (ref 27–34)
MCHC RBC-ENTMCNC: 31.2 PG — SIGNIFICANT CHANGE UP (ref 27–34)
MCHC RBC-ENTMCNC: 32.9 G/DL — SIGNIFICANT CHANGE UP (ref 32–36)
MCHC RBC-ENTMCNC: 33.6 G/DL — SIGNIFICANT CHANGE UP (ref 32–36)
MCHC RBC-ENTMCNC: 33.9 G/DL — SIGNIFICANT CHANGE UP (ref 32–36)
MCV RBC AUTO: 91.9 FL — SIGNIFICANT CHANGE UP (ref 80–100)
MCV RBC AUTO: 92.1 FL — SIGNIFICANT CHANGE UP (ref 80–100)
MCV RBC AUTO: 92.4 FL — SIGNIFICANT CHANGE UP (ref 80–100)
MONOCYTES # BLD AUTO: 0.14 K/UL — SIGNIFICANT CHANGE UP (ref 0–0.9)
MONOCYTES # BLD AUTO: 0.51 K/UL — SIGNIFICANT CHANGE UP (ref 0–0.9)
MONOCYTES # BLD AUTO: 0.52 K/UL — SIGNIFICANT CHANGE UP (ref 0–0.9)
MONOCYTES NFR BLD AUTO: 0.9 % — LOW (ref 2–14)
MONOCYTES NFR BLD AUTO: 2.2 % — SIGNIFICANT CHANGE UP (ref 2–14)
MONOCYTES NFR BLD AUTO: 2.3 % — SIGNIFICANT CHANGE UP (ref 2–14)
NEUTROPHILS # BLD AUTO: 15.7 K/UL — HIGH (ref 1.8–7.4)
NEUTROPHILS # BLD AUTO: 21.07 K/UL — HIGH (ref 1.8–7.4)
NEUTROPHILS # BLD AUTO: 23.01 K/UL — HIGH (ref 1.8–7.4)
NEUTROPHILS NFR BLD AUTO: 96.8 % — HIGH (ref 43–77)
NEUTROPHILS NFR BLD AUTO: 96.9 % — HIGH (ref 43–77)
NEUTROPHILS NFR BLD AUTO: 99.1 % — HIGH (ref 43–77)
NRBC # BLD: 0 /100 WBCS — SIGNIFICANT CHANGE UP (ref 0–0)
NRBC # BLD: 0 /100 WBCS — SIGNIFICANT CHANGE UP (ref 0–0)
PHOSPHATE SERPL-MCNC: 5.2 MG/DL — HIGH (ref 2.5–4.5)
PHOSPHATE SERPL-MCNC: 6.8 MG/DL — HIGH (ref 2.5–4.5)
PLAT MORPH BLD: NORMAL — SIGNIFICANT CHANGE UP
PLATELET # BLD AUTO: 196 K/UL — SIGNIFICANT CHANGE UP (ref 150–400)
PLATELET # BLD AUTO: 205 K/UL — SIGNIFICANT CHANGE UP (ref 150–400)
PLATELET # BLD AUTO: 206 K/UL — SIGNIFICANT CHANGE UP (ref 150–400)
POTASSIUM SERPL-MCNC: 3.7 MMOL/L — SIGNIFICANT CHANGE UP (ref 3.5–5.3)
POTASSIUM SERPL-MCNC: 3.9 MMOL/L — SIGNIFICANT CHANGE UP (ref 3.5–5.3)
POTASSIUM SERPL-SCNC: 3.7 MMOL/L — SIGNIFICANT CHANGE UP (ref 3.5–5.3)
POTASSIUM SERPL-SCNC: 3.9 MMOL/L — SIGNIFICANT CHANGE UP (ref 3.5–5.3)
PROT SERPL-MCNC: 5.8 G/DL — LOW (ref 6–8.3)
PROT SERPL-MCNC: 5.9 G/DL — LOW (ref 6–8.3)
PROTHROM AB SERPL-ACNC: 10.3 SEC — SIGNIFICANT CHANGE UP (ref 9.9–13.4)
PROTHROM AB SERPL-ACNC: 10.9 SEC — SIGNIFICANT CHANGE UP (ref 9.9–13.4)
RBC # BLD: 2.54 M/UL — LOW (ref 4.2–5.8)
RBC # BLD: 2.6 M/UL — LOW (ref 4.2–5.8)
RBC # BLD: 2.64 M/UL — LOW (ref 4.2–5.8)
RBC # FLD: 14.3 % — SIGNIFICANT CHANGE UP (ref 10.3–14.5)
RBC # FLD: 14.4 % — SIGNIFICANT CHANGE UP (ref 10.3–14.5)
RBC # FLD: 14.6 % — HIGH (ref 10.3–14.5)
RBC BLD AUTO: SIGNIFICANT CHANGE UP
SODIUM SERPL-SCNC: 140 MMOL/L — SIGNIFICANT CHANGE UP (ref 135–145)
SODIUM SERPL-SCNC: 141 MMOL/L — SIGNIFICANT CHANGE UP (ref 135–145)
WBC # BLD: 15.84 K/UL — HIGH (ref 3.8–10.5)
WBC # BLD: 21.77 K/UL — HIGH (ref 3.8–10.5)
WBC # BLD: 23.77 K/UL — HIGH (ref 3.8–10.5)
WBC # FLD AUTO: 15.84 K/UL — HIGH (ref 3.8–10.5)
WBC # FLD AUTO: 21.77 K/UL — HIGH (ref 3.8–10.5)
WBC # FLD AUTO: 23.77 K/UL — HIGH (ref 3.8–10.5)

## 2024-12-06 PROCEDURE — 93010 ELECTROCARDIOGRAM REPORT: CPT

## 2024-12-06 PROCEDURE — 99223 1ST HOSP IP/OBS HIGH 75: CPT | Mod: GC

## 2024-12-06 PROCEDURE — 76776 US EXAM K TRANSPL W/DOPPLER: CPT | Mod: 26,RT

## 2024-12-06 PROCEDURE — 71045 X-RAY EXAM CHEST 1 VIEW: CPT | Mod: 26

## 2024-12-06 RX ORDER — MYCOPHENOLATE MOFETIL 500 MG/1
1 TABLET ORAL
Refills: 0 | Status: DISCONTINUED | OUTPATIENT
Start: 2024-12-07 | End: 2024-12-07

## 2024-12-06 RX ORDER — FAMOTIDINE 20 MG/1
20 TABLET, FILM COATED ORAL DAILY
Refills: 0 | Status: DISCONTINUED | OUTPATIENT
Start: 2024-12-07 | End: 2024-12-10

## 2024-12-06 RX ORDER — ONDANSETRON HYDROCHLORIDE 4 MG/1
4 TABLET, FILM COATED ORAL ONCE
Refills: 0 | Status: DISCONTINUED | OUTPATIENT
Start: 2024-12-06 | End: 2024-12-06

## 2024-12-06 RX ORDER — TRAMADOL HYDROCHLORIDE 300 MG/1
50 CAPSULE ORAL EVERY 8 HOURS
Refills: 0 | Status: DISCONTINUED | OUTPATIENT
Start: 2024-12-06 | End: 2024-12-10

## 2024-12-06 RX ORDER — FENTANYL 12 UG/H
25 PATCH, EXTENDED RELEASE TRANSDERMAL
Refills: 0 | Status: DISCONTINUED | OUTPATIENT
Start: 2024-12-06 | End: 2024-12-06

## 2024-12-06 RX ORDER — TACROLIMUS 5 MG/1
2 CAPSULE ORAL
Refills: 0 | Status: DISCONTINUED | OUTPATIENT
Start: 2024-12-07 | End: 2024-12-10

## 2024-12-06 RX ORDER — TRAMADOL HYDROCHLORIDE 300 MG/1
25 CAPSULE ORAL EVERY 6 HOURS
Refills: 0 | Status: DISCONTINUED | OUTPATIENT
Start: 2024-12-06 | End: 2024-12-10

## 2024-12-06 RX ORDER — AMLODIPINE BESYLATE 5 MG/1
5 TABLET ORAL DAILY
Qty: 30 | Refills: 11 | Status: ACTIVE | COMMUNITY
Start: 2024-12-06 | End: 1900-01-01

## 2024-12-06 RX ORDER — ALLOPURINOL 300 MG/1
300 TABLET ORAL DAILY
Qty: 30 | Refills: 11 | Status: ACTIVE | COMMUNITY
Start: 2024-12-06 | End: 1900-01-01

## 2024-12-06 RX ORDER — VALGANCICLOVIR HYDROCHLORIDE POWDER, 50 MG/ML
450 FOR SOLUTION ORAL DAILY
Refills: 0 | Status: DISCONTINUED | OUTPATIENT
Start: 2024-12-07 | End: 2024-12-10

## 2024-12-06 RX ORDER — TACROLIMUS 5 MG/1
2 CAPSULE ORAL ONCE
Refills: 0 | Status: COMPLETED | OUTPATIENT
Start: 2024-12-06 | End: 2024-12-06

## 2024-12-06 RX ORDER — ALLOPURINOL 300 MG/1
300 TABLET ORAL DAILY
Refills: 0 | Status: DISCONTINUED | OUTPATIENT
Start: 2024-12-06 | End: 2024-12-10

## 2024-12-06 RX ORDER — METHYLPREDNISOLONE SOD SUCC 125 MG
250 VIAL (EA) INJECTION ONCE
Refills: 0 | Status: COMPLETED | OUTPATIENT
Start: 2024-12-06 | End: 2024-12-06

## 2024-12-06 RX ORDER — CHLORHEXIDINE GLUCONATE 1.2 MG/ML
1 RINSE ORAL DAILY
Refills: 0 | Status: DISCONTINUED | OUTPATIENT
Start: 2024-12-06 | End: 2024-12-10

## 2024-12-06 RX ORDER — GLUCAGON INJECTION, SOLUTION 0.5 MG/.1ML
1 INJECTION, SOLUTION SUBCUTANEOUS ONCE
Refills: 0 | Status: DISCONTINUED | OUTPATIENT
Start: 2024-12-06 | End: 2024-12-10

## 2024-12-06 RX ORDER — PREDNISONE 5 MG/1
5 TABLET ORAL
Qty: 30 | Refills: 11 | Status: ACTIVE | COMMUNITY
Start: 2024-12-06 | End: 1900-01-01

## 2024-12-06 RX ORDER — 0.9 % SODIUM CHLORIDE 0.9 %
1000 INTRAVENOUS SOLUTION INTRAVENOUS
Refills: 0 | Status: DISCONTINUED | OUTPATIENT
Start: 2024-12-06 | End: 2024-12-10

## 2024-12-06 RX ORDER — ROSUVASTATIN CALCIUM 5 MG/1
20 TABLET, FILM COATED ORAL AT BEDTIME
Refills: 0 | Status: DISCONTINUED | OUTPATIENT
Start: 2024-12-06 | End: 2024-12-10

## 2024-12-06 RX ORDER — PREDNISONE 5 MG/1
5 TABLET ORAL
Qty: 41 | Refills: 0 | Status: ACTIVE | COMMUNITY
Start: 2024-12-06 | End: 1900-01-01

## 2024-12-06 RX ORDER — FAMOTIDINE 20 MG/1
20 TABLET, FILM COATED ORAL
Qty: 30 | Refills: 11 | Status: ACTIVE | COMMUNITY
Start: 2024-12-06 | End: 1900-01-01

## 2024-12-06 RX ORDER — 0.9 % SODIUM CHLORIDE 0.9 %
1000 INTRAVENOUS SOLUTION INTRAVENOUS
Refills: 0 | Status: DISCONTINUED | OUTPATIENT
Start: 2024-12-06 | End: 2024-12-07

## 2024-12-06 RX ORDER — MYCOPHENOLATE MOFETIL 500 MG/1
500 TABLET ORAL TWICE DAILY
Qty: 120 | Refills: 11 | Status: ACTIVE | COMMUNITY
Start: 2024-12-06 | End: 1900-01-01

## 2024-12-06 RX ORDER — ROSUVASTATIN CALCIUM 20 MG/1
20 TABLET, FILM COATED ORAL DAILY
Qty: 30 | Refills: 11 | Status: ACTIVE | COMMUNITY
Start: 2024-12-06 | End: 1900-01-01

## 2024-12-06 RX ORDER — SULFAMETHOXAZOLE AND TRIMETHOPRIM 400; 80 MG/1; MG/1
400-80 TABLET ORAL
Qty: 30 | Refills: 11 | Status: ACTIVE | COMMUNITY
Start: 2024-12-06 | End: 1900-01-01

## 2024-12-06 RX ORDER — VALGANCICLOVIR HYDROCHLORIDE 450 MG/1
450 TABLET ORAL
Qty: 30 | Refills: 5 | Status: ACTIVE | COMMUNITY
Start: 2024-12-06 | End: 1900-01-01

## 2024-12-06 RX ORDER — NYSTATIN 100000 [USP'U]/ML
100000 SUSPENSION ORAL
Qty: 150 | Refills: 4 | Status: ACTIVE | COMMUNITY
Start: 2024-12-06 | End: 1900-01-01

## 2024-12-06 RX ORDER — SENNOSIDES 8.6 MG
2 TABLET ORAL AT BEDTIME
Refills: 0 | Status: DISCONTINUED | OUTPATIENT
Start: 2024-12-06 | End: 2024-12-10

## 2024-12-06 RX ORDER — ONDANSETRON HYDROCHLORIDE 4 MG/1
4 TABLET, FILM COATED ORAL EVERY 6 HOURS
Refills: 0 | Status: DISCONTINUED | OUTPATIENT
Start: 2024-12-06 | End: 2024-12-10

## 2024-12-06 RX ORDER — SENNOSIDES 8.6 MG/1
8.6 CAPSULE, GELATIN COATED ORAL
Qty: 30 | Refills: 11 | Status: ACTIVE | COMMUNITY
Start: 2024-12-06 | End: 1900-01-01

## 2024-12-06 RX ORDER — AMLODIPINE BESYLATE 10 MG/1
5 TABLET ORAL DAILY
Refills: 0 | Status: DISCONTINUED | OUTPATIENT
Start: 2024-12-06 | End: 2024-12-10

## 2024-12-06 RX ORDER — POLYETHYLENE GLYCOL 3350 17 G/17G
17 POWDER, FOR SOLUTION ORAL DAILY
Refills: 0 | Status: DISCONTINUED | OUTPATIENT
Start: 2024-12-06 | End: 2024-12-10

## 2024-12-06 RX ORDER — 0.9 % SODIUM CHLORIDE 0.9 %
1000 INTRAVENOUS SOLUTION INTRAVENOUS
Refills: 0 | Status: DISCONTINUED | OUTPATIENT
Start: 2024-12-06 | End: 2024-12-06

## 2024-12-06 RX ORDER — FENTANYL 12 UG/H
50 PATCH, EXTENDED RELEASE TRANSDERMAL
Refills: 0 | Status: DISCONTINUED | OUTPATIENT
Start: 2024-12-06 | End: 2024-12-06

## 2024-12-06 RX ORDER — LABETALOL HYDROCHLORIDE 100 MG/1
100 TABLET, FILM COATED ORAL
Qty: 90 | Refills: 11 | Status: ACTIVE | COMMUNITY
Start: 2024-12-06 | End: 1900-01-01

## 2024-12-06 RX ORDER — NYSTATIN 500000 [USP'U]/1
500000 TABLET, FILM COATED ORAL
Refills: 0 | Status: DISCONTINUED | OUTPATIENT
Start: 2024-12-07 | End: 2024-12-10

## 2024-12-06 RX ADMIN — Medication 70 MILLILITER(S): at 01:00

## 2024-12-06 RX ADMIN — TRAMADOL HYDROCHLORIDE 50 MILLIGRAM(S): 300 CAPSULE ORAL at 19:30

## 2024-12-06 RX ADMIN — ALLOPURINOL 300 MILLIGRAM(S): 300 TABLET ORAL at 17:37

## 2024-12-06 RX ADMIN — TRAMADOL HYDROCHLORIDE 25 MILLIGRAM(S): 300 CAPSULE ORAL at 17:16

## 2024-12-06 RX ADMIN — TRAMADOL HYDROCHLORIDE 50 MILLIGRAM(S): 300 CAPSULE ORAL at 20:30

## 2024-12-06 RX ADMIN — Medication 2: at 12:56

## 2024-12-06 RX ADMIN — TACROLIMUS 2 MILLIGRAM(S): 5 CAPSULE ORAL at 12:57

## 2024-12-06 RX ADMIN — CHLORHEXIDINE GLUCONATE 1 APPLICATION(S): 1.2 RINSE ORAL at 12:57

## 2024-12-06 RX ADMIN — Medication 1: at 21:29

## 2024-12-06 RX ADMIN — Medication 100 MILLIGRAM(S): at 16:28

## 2024-12-06 RX ADMIN — ROSUVASTATIN CALCIUM 20 MILLIGRAM(S): 5 TABLET, FILM COATED ORAL at 21:28

## 2024-12-06 RX ADMIN — AMLODIPINE BESYLATE 5 MILLIGRAM(S): 10 TABLET ORAL at 16:26

## 2024-12-06 RX ADMIN — POLYETHYLENE GLYCOL 3350 17 GRAM(S): 17 POWDER, FOR SOLUTION ORAL at 12:56

## 2024-12-06 RX ADMIN — Medication 2.4 MILLION UNIT(S): at 21:30

## 2024-12-06 RX ADMIN — Medication 2 TABLET(S): at 21:28

## 2024-12-06 RX ADMIN — Medication 50 MILLILITER(S): at 04:07

## 2024-12-06 RX ADMIN — Medication 150 MILLILITER(S): at 21:30

## 2024-12-06 RX ADMIN — TRAMADOL HYDROCHLORIDE 25 MILLIGRAM(S): 300 CAPSULE ORAL at 16:26

## 2024-12-06 RX ADMIN — Medication 3: at 17:35

## 2024-12-06 RX ADMIN — Medication 2: at 07:15

## 2024-12-06 NOTE — PROGRESS NOTE ADULT - ASSESSMENT
79 male with PMHx HTN, left inguinal hernia repair, HLD, PKD for past ~12 years recently with increased Cr for past 6 months, makes normal amount of urine, not on HD. Patient is now s/p DDRT 1a/1v/1u+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    [] DDRT 1a/1v/1u+stent  - trend labs, vital signs  - Strict I's & O's, JPx2, agarwal  - Normosol IVF maintenance & replacement  - Pain regimen, bowel regimen  - ADAT  - SCDs/IS  - Renal US: patent  - Transfer to floor pending PACU course    [] IMMUNO  - Thymoglobulin induction with SST  - Env TBD, MMF 1g BID

## 2024-12-06 NOTE — PHYSICAL THERAPY INITIAL EVALUATION ADULT - PERTINENT HX OF CURRENT PROBLEM, REHAB EVAL
79 male with PMHx HTN, left inguinal hernia repair, HLD, PKD for past ~12 years recently with increased Cr for past 6 months, makes normal amount of urine, not on HD. Patient is now s/p DDRT 1a/1v/1u+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

## 2024-12-06 NOTE — BRIEF OPERATIVE NOTE - ESTIMATED BLOOD LOSS
Medicine Progress Note    Patient is a 84y old  Female who presents with a chief complaint of R IT fx (15 Sep 2024 07:43)      SUBJECTIVE / OVERNIGHT EVENTS: no events over night     ADDITIONAL REVIEW OF SYSTEMS: negative     MEDICATIONS  (STANDING):  apixaban 2.5 milliGRAM(s) Oral every 12 hours  atenolol  Tablet 25 milliGRAM(s) Oral daily  ezetimibe 10 milliGRAM(s) Oral daily  influenza  Vaccine (HIGH DOSE) 0.5 milliLiter(s) IntraMuscular once  lactated ringers. 1000 milliLiter(s) (30 mL/Hr) IV Continuous <Continuous>  ranolazine 500 milliGRAM(s) Oral two times a day  rosuvastatin 20 milliGRAM(s) Oral at bedtime  senna 2 Tablet(s) Oral at bedtime  sertraline 200 milliGRAM(s) Oral daily  sodium chloride 0.9%. 1000 milliLiter(s) (125 mL/Hr) IV Continuous <Continuous>    MEDICATIONS  (PRN):  melatonin 3 milliGRAM(s) Oral at bedtime PRN Sleep  oxyCODONE    IR 5 milliGRAM(s) Oral every 4 hours PRN Moderate Pain (4 - 6)  oxyCODONE    IR 10 milliGRAM(s) Oral every 4 hours PRN Severe Pain (7 - 10)    CAPILLARY BLOOD GLUCOSE        I&O's Summary    15 Sep 2024 07:01  -  15 Sep 2024 15:43  --------------------------------------------------------  IN: 330 mL / OUT: 1200 mL / NET: -870 mL        PHYSICAL EXAM:  Vital Signs Last 24 Hrs  T(C): 36.2 (15 Sep 2024 09:15), Max: 37.6 (15 Sep 2024 02:00)  T(F): 97.2 (15 Sep 2024 09:15), Max: 99.7 (15 Sep 2024 02:00)  HR: 77 (15 Sep 2024 09:15) (67 - 77)  BP: 116/85 (15 Sep 2024 09:15) (104/39 - 127/41)  BP(mean): --  RR: 18 (15 Sep 2024 09:15) (18 - 19)  SpO2: 92% (15 Sep 2024 09:15) (88% - 96%)    Parameters below as of 15 Sep 2024 09:15  Patient On (Oxygen Delivery Method): room air      CONSTITUTIONAL: NAD,  ENMT: Moist oral mucosa, no pharyngeal injection or exudates;   RESPIRATORY: Normal respiratory effort; lungs are clear to auscultation bilaterally  CARDIOVASCULAR: Regular rate and rhythm, normal S1 and S2, ; No lower extremity edema; rt hip dressing   ABDOMEN: Nontender to palpation, normoactive bowel sounds, no rebound/guarding;   PSYCH: A+O to person, place, and time; affect appropriate  NEUROLOGY: CN 2-12 are intact and symmetric; no gross sensory deficits   SKIN: No rashes;     LABS:                        9.5    9.34  )-----------( 173      ( 14 Sep 2024 05:32 )             30.0     09-14    138  |  103  |  16  ----------------------------<  113<H>  4.4   |  25  |  0.59    Ca    8.3<L>      14 Sep 2024 05:32  Phos  2.2     09-14  Mg     1.80     09-14            Urinalysis Basic - ( 14 Sep 2024 05:32 )    Color: x / Appearance: x / SG: x / pH: x  Gluc: 113 mg/dL / Ketone: x  / Bili: x / Urobili: x   Blood: x / Protein: x / Nitrite: x   Leuk Esterase: x / RBC: x / WBC x   Sq Epi: x / Non Sq Epi: x / Bacteria: x            RADIOLOGY & ADDITIONAL TESTS:  Imaging from Last 24 Hours:    Electrocardiogram/QTc Interval:    COORDINATION OF CARE:  Care Discussed with Consultants/Other Providers:   ORTHO ATTENDING POST OP    S/P IM FIXATION R   HIP  WBAT R   LE  eliquis 2.5 bid  venodynes  ancef 1 g x 24 h  OOB to chair in AM  rehab consult   f/u medicine  CBC in RR and AM    Orthopedics Post-Op Check:  Patient was seen and examined at bedside. Denies CP/SOB/Dizziness, N/V/D, HA. Pain is controlled.     Vital Signs Last 24 Hrs  T(C): 36.7 (13 Sep 2024 17:00), Max: 37.1 (13 Sep 2024 12:15)  T(F): 98 (13 Sep 2024 17:00), Max: 98.8 (13 Sep 2024 12:15)  HR: 61 (13 Sep 2024 17:15) (60 - 70)  BP: 118/46 (13 Sep 2024 17:15) (116/38 - 160/59)  BP(mean): 66 (13 Sep 2024 17:15) (56 - 99)  RR: 15 (13 Sep 2024 17:15) (13 - 20)  SpO2: 97% (13 Sep 2024 17:15) (94% - 100%)    Parameters below as of 13 Sep 2024 17:00  Patient On (Oxygen Delivery Method): room air        Labs:                        10.5   12.94 )-----------( 194      ( 13 Sep 2024 16:00 )             33.2       09-13    138  |  105  |  10  ----------------------------<  116<H>  3.7   |  25  |  0.53    Ca    8.4      13 Sep 2024 16:00    TPro  6.1  /  Alb  3.6  /  TBili  0.3  /  DBili  x   /  AST  36<H>  /  ALT  30  /  AlkPhos  81  09-12        Physical Exam:  Gen: NAD  LUE: ecchymosis, mild edema and TTP over L proximal 1st digit and dorsolateral hand  RLE:   Dressing C/D/I  Motor intact 5/5 EHL/FHL/TA/GS. Sensation is grossly intact.   Compartments are soft, extremities are warm, DP 2+   ORTHOPAEDIC PROGRESS NOTE    SUBJECTIVE:  Pt seen and examined at bedside this am.  Doing well.  No acute events overnight.  Pt states pain is well controlled    OBJECTIVE:  Vital Signs Last 24 Hrs  T(C): 36.3 (14 Sep 2024 05:43), Max: 37.1 (13 Sep 2024 12:15)  T(F): 97.4 (14 Sep 2024 05:43), Max: 98.8 (13 Sep 2024 12:15)  HR: 65 (14 Sep 2024 05:43) (60 - 68)  BP: 112/35 (14 Sep 2024 05:43) (96/41 - 155/59)  BP(mean): 66 (13 Sep 2024 17:15) (56 - 99)  RR: 18 (14 Sep 2024 05:43) (13 - 20)  SpO2: 94% (14 Sep 2024 05:43) (90% - 100%)    Parameters below as of 14 Sep 2024 05:43  Patient On (Oxygen Delivery Method): nasal cannula  O2 Flow (L/min): 2      Physical Exam:  General: NAD; resting comfrotably in bed  Resp: non labored  RLE:   Dressing C/D/I  Motor intact 5/5 EHL/FHL/TA/GS  Sensation is grossly intact.   Compartments are soft, extremities are warm, DP 2+    LABS                        10.5   12.94 )-----------( 194      ( 13 Sep 2024 16:00 )             33.2       09-13    138  |  105  |  10  ----------------------------<  116<H>  3.7   |  25  |  0.53    Ca    8.4      13 Sep 2024 16:00        PT/INR - ( 13 Sep 2024 01:04 )   PT: 11.5 sec;   INR: 1.02 ratio         PTT - ( 13 Sep 2024 01:04 )  PTT:35.2 sec    I&O's Summary    12 Sep 2024 07:01  -  13 Sep 2024 07:00  --------------------------------------------------------  IN: 0 mL / OUT: 450 mL / NET: -450 mL    13 Sep 2024 07:01  -  14 Sep 2024 06:15  --------------------------------------------------------  IN: 860 mL / OUT: 1250 mL / NET: -390 mL             Orthopedic Progress Note     S:  No acute events overnight, pain is well controlled.  Patient denies any chest pain, SOB, N/V, fevers/chills.    T(C): 36.8 (09-16-24 @ 06:00), Max: 36.8 (09-16-24 @ 06:00)  HR: 66 (09-16-24 @ 06:00) (66 - 77)  BP: 131/46 (09-16-24 @ 06:00) (116/85 - 132/38)  RR: 18 (09-16-24 @ 06:00) (18 - 18)  SpO2: 92% (09-16-24 @ 06:00) (92% - 99%)  Wt(kg): --I&O's Summary    15 Sep 2024 07:01  -  16 Sep 2024 06:57  --------------------------------------------------------  IN: 1050 mL / OUT: 1800 mL / NET: -750 mL        O:  Physical Exam:  General: NAD; resting comfrotably in bed  Resp: non labored  RLE:   Dressing C/D/I  Motor intact 5/5 EHL/FHL/TA/GS  Sensation is grossly intact.   Compartments are soft, extremities are warm, DP 2+      Orthopedic Progress Note     S:  No acute events overnight, pain is well controlled.  Patient denies any chest pain, SOB, N/V, fevers/chills.    Vital Signs Last 24 Hrs  T(C): 36.5 (16 Sep 2024 21:28), Max: 37.3 (16 Sep 2024 13:45)  T(F): 97.7 (16 Sep 2024 21:28), Max: 99.1 (16 Sep 2024 13:45)  HR: 66 (16 Sep 2024 21:28) (66 - 73)  BP: 106/78 (16 Sep 2024 21:28) (106/78 - 134/41)  BP(mean): --  RR: 17 (16 Sep 2024 21:28) (17 - 18)  SpO2: 95% (16 Sep 2024 21:28) (93% - 97%)    Parameters below as of 16 Sep 2024 21:28  Patient On (Oxygen Delivery Method): room air      O:  Physical Exam:  General: NAD; resting comfrotably in bed  Resp: non labored  RLE:   Dressing C/D/I  Motor intact 5/5 EHL/FHL/TA/GS  Sensation is grossly intact.   Compartments are soft, extremities are warm, DP 2+      ORTHOPAEDIC PROGRESS NOTE    SUBJECTIVE:  Pt seen and examined at bedside this am.  Doing well.  No acute events overnight.  Pt states pain is well controlled    OBJECTIVE:  Vital Signs Last 24 Hrs  T(C): 36.7 (15 Sep 2024 07:11), Max: 37.6 (15 Sep 2024 02:00)  T(F): 98 (15 Sep 2024 07:11), Max: 99.7 (15 Sep 2024 02:00)  HR: 71 (15 Sep 2024 07:11) (67 - 77)  BP: 127/41 (15 Sep 2024 07:11) (103/41 - 127/41)  BP(mean): --  RR: 18 (15 Sep 2024 07:11) (18 - 19)  SpO2: 95% (15 Sep 2024 07:11) (91% - 96%)    Parameters below as of 15 Sep 2024 07:11  Patient On (Oxygen Delivery Method): nasal cannula  O2 Flow (L/min): 2      Physical Exam:  General: NAD; resting comfrotably in bed  Resp: non labored  RLE:   Dressing C/D/I  Motor intact 5/5 EHL/FHL/TA/GS  Sensation is grossly intact.   Compartments are soft, extremities are warm, DP 2+          LABS:  cret                        9.5    9.34  )-----------( 173      ( 14 Sep 2024 05:32 )             30.0     09-14    138  |  103  |  16  ----------------------------<  113<H>  4.4   |  25  |  0.59    Ca    8.3<L>      14 Sep 2024 05:32  Phos  2.2     09-14  Mg     1.80     09-14       Orthopedic Surgery Progress Note     S: Patient seen and examined today. Complaining of pain in R hip overnight, pain well controlled this am. Denies f/c, chest pain, shortness of breath, dizziness.    MEDICATIONS  (STANDING):  atenolol  Tablet 25 milliGRAM(s) Oral daily  chlorhexidine 2% Cloths 1 Application(s) Topical once  ezetimibe 10 milliGRAM(s) Oral daily  influenza  Vaccine (HIGH DOSE) 0.5 milliLiter(s) IntraMuscular once  potassium chloride    Tablet ER 40 milliEquivalent(s) Oral once  povidone iodine 10% Nasal Swab 1 Application(s) Both Nostrils once  ranolazine 500 milliGRAM(s) Oral two times a day  rosuvastatin 20 milliGRAM(s) Oral at bedtime  senna 2 Tablet(s) Oral at bedtime  sertraline 200 milliGRAM(s) Oral daily  sodium chloride 0.9%. 1000 milliLiter(s) (125 mL/Hr) IV Continuous <Continuous>    MEDICATIONS  (PRN):  melatonin 3 milliGRAM(s) Oral at bedtime PRN Sleep  oxyCODONE    IR 5 milliGRAM(s) Oral every 4 hours PRN Moderate Pain (4 - 6)  oxyCODONE    IR 10 milliGRAM(s) Oral every 4 hours PRN Severe Pain (7 - 10)      Vital Signs Last 24 Hrs  T(C): 37 (12 Sep 2024 21:14), Max: 37 (12 Sep 2024 21:14)  T(F): 98.6 (12 Sep 2024 21:14), Max: 98.6 (12 Sep 2024 21:14)  HR: 70 (12 Sep 2024 21:14) (57 - 70)  BP: 160/59 (12 Sep 2024 21:14) (133/63 - 183/70)  BP(mean): --  RR: 17 (12 Sep 2024 21:14) (16 - 18)  SpO2: 94% (12 Sep 2024 21:14) (94% - 100%)    Parameters below as of 12 Sep 2024 21:14  Patient On (Oxygen Delivery Method): room air        09-12-24 @ 07:01  -  09-13-24 @ 06:22  --------------------------------------------------------  IN: 0 mL / OUT: 300 mL / NET: -300 mL        Physical Exam:  Gen: NAD, alert and oriented  Resp: Unlabored breathing  RLE: Skin intact, no ecchymosis,        SILT DP/SP/ Brenda/Saph/Post Tib       +EHL/FHL/TA/Gastroc,        Knee/ankle painless ROM,        hip ROM limited 2/2 pain,       DP+,        soft compartments, no calf ttp,        +log roll.    LABS:                        11.7   9.14  )-----------( 212      ( 13 Sep 2024 01:04 )             35.1     09-13    139  |  103  |  13  ----------------------------<  139<H>  3.8   |  26  |  0.54    Ca    8.6      13 Sep 2024 01:04    TPro  6.1  /  Alb  3.6  /  TBili  0.3  /  DBili  x   /  AST  36<H>  /  ALT  30  /  AlkPhos  81  09-12   Hospitalist Progress Note  Monique Harding MD Pager # [phone]    OVERNIGHT EVENTS:ALEXANDER     SUBJECTIVE / INTERVAL HPI: Patient seen and examined at bedside. Patient reports she is having trouble walking to the bathroom. She has pain when sitting up in a chair. She is worried about leaving the hospital.     VITAL SIGNS:  Vital Signs Last 24 Hrs  T(C): 36.6 (16 Sep 2024 09:45), Max: 36.8 (16 Sep 2024 06:00)  T(F): 97.8 (16 Sep 2024 09:45), Max: 98.3 (16 Sep 2024 06:00)  HR: 71 (16 Sep 2024 09:45) (66 - 71)  BP: 128/31 (16 Sep 2024 09:45) (121/50 - 132/38)  BP(mean): --  RR: 17 (16 Sep 2024 09:45) (17 - 18)  SpO2: 93% (16 Sep 2024 09:45) (92% - 99%)    Parameters below as of 16 Sep 2024 09:45  Patient On (Oxygen Delivery Method): room air        PHYSICAL EXAM:    General: Comfortable and sitting in a chair.   HEENT: NC/AT; PERRL, anicteric sclera; MMM  Neck: supple  Cardiovascular: +S1/S2; RRR  Respiratory: CTA B/L; no W/R/R  Gastrointestinal: soft, NT/ND; +BSx4  Extremities: WWP; no edema, clubbing or cyanosis  Vascular: 2+ radial, DP/PT pulses B/L  Neurological: AAOx3; no focal deficits    MEDICATIONS:  MEDICATIONS  (STANDING):  apixaban 2.5 milliGRAM(s) Oral every 12 hours  atenolol  Tablet 25 milliGRAM(s) Oral daily  ezetimibe 10 milliGRAM(s) Oral daily  ibuprofen  Tablet. 800 milliGRAM(s) Oral every 6 hours  influenza  Vaccine (HIGH DOSE) 0.5 milliLiter(s) IntraMuscular once  ranolazine 500 milliGRAM(s) Oral two times a day  rosuvastatin 20 milliGRAM(s) Oral at bedtime  senna 2 Tablet(s) Oral at bedtime  sertraline 200 milliGRAM(s) Oral daily    MEDICATIONS  (PRN):  melatonin 3 milliGRAM(s) Oral at bedtime PRN Sleep  oxyCODONE    IR 10 milliGRAM(s) Oral every 4 hours PRN Severe Pain (7 - 10)  oxyCODONE    IR 5 milliGRAM(s) Oral every 4 hours PRN Moderate Pain (4 - 6)      ALLERGIES:  Allergies    sulfa drugs (Hives)  Tylenol (Rash)    Intolerances        LABS:                        8.8    7.76  )-----------( 197      ( 16 Sep 2024 06:00 )             27.2     09-16    138  |  102  |  9   ----------------------------<  102<H>  4.3   |  28  |  0.47<L>    Ca    8.1<L>      16 Sep 2024 06:00        Urinalysis Basic - ( 16 Sep 2024 06:00 )    Color: x / Appearance: x / SG: x / pH: x  Gluc: 102 mg/dL / Ketone: x  / Bili: x / Urobili: x   Blood: x / Protein: x / Nitrite: x   Leuk Esterase: x / RBC: x / WBC x   Sq Epi: x / Non Sq Epi: x / Bacteria: x      CAPILLARY BLOOD GLUCOSE          RADIOLOGY & ADDITIONAL TESTS: Reviewed.    ASSESSMENT:    PLAN:  Lynda Jimenez MD  [facility] Division of Hospital Medicine  Pager [phone] (M-F 8AM-5PM)  Other Times: [phone]    Patient is a 84y old  Female who presents with a chief complaint of R IT fx (13 Sep 2024 06:22)    SUBJECTIVE / OVERNIGHT EVENTS: no acute events overnight     MEDICATIONS  (STANDING):  atenolol  Tablet 25 milliGRAM(s) Oral daily  ezetimibe 10 milliGRAM(s) Oral daily  influenza  Vaccine (HIGH DOSE) 0.5 milliLiter(s) IntraMuscular once  ranolazine 500 milliGRAM(s) Oral two times a day  rosuvastatin 20 milliGRAM(s) Oral at bedtime  senna 2 Tablet(s) Oral at bedtime  sertraline 200 milliGRAM(s) Oral daily  sodium chloride 0.9%. 1000 milliLiter(s) (125 mL/Hr) IV Continuous <Continuous>    MEDICATIONS  (PRN):  melatonin 3 milliGRAM(s) Oral at bedtime PRN Sleep  oxyCODONE    IR 5 milliGRAM(s) Oral every 4 hours PRN Moderate Pain (4 - 6)  oxyCODONE    IR 10 milliGRAM(s) Oral every 4 hours PRN Severe Pain (7 - 10)      PHYSICAL EXAM:  Vital Signs Last 24 Hrs  T(C): 36.4 (13 Sep 2024 10:49), Max: 37 (12 Sep 2024 21:14)  T(F): 97.6 (13 Sep 2024 10:49), Max: 98.6 (12 Sep 2024 21:14)  HR: 63 (13 Sep 2024 10:49) (61 - 70)  BP: 149/48 (13 Sep 2024 10:49) (149/48 - 183/70)  BP(mean): --  RR: 18 (13 Sep 2024 10:49) (16 - 18)  SpO2: 95% (13 Sep 2024 10:49) (94% - 100%)    Parameters below as of 13 Sep 2024 10:08  Patient On (Oxygen Delivery Method): room air        CONSTITUTIONAL: resting in bed comfortably   RESPIRATORY: Normal respiratory effort; lungs are clear to auscultation bilaterally  CARDIOVASCULAR: Regular rate and rhythm, normal S1 and S2, no murmur/rub/gallop; No lower extremity edema  GASTROINTESTINAL: Nontender to palpation, normoactive bowel sounds, no rebound/guarding; No hepatosplenomegaly  MUSCULOSKELETAL:  no clubbing or cyanosis of digits; no joint swelling or tenderness to palpation  NEUROLOGY: non-focal; no gross sensory deficits   PSYCH: A+O to person, place, and time; affect appropriate  SKIN: No rashes; warm     LABS:                        11.7   9.14  )-----------( 212      ( 13 Sep 2024 01:04 )             35.1     09-13    139  |  103  |  13  ----------------------------<  139<H>  3.8   |  26  |  0.54    Ca    8.6      13 Sep 2024 01:04    TPro  6.1  /  Alb  3.6  /  TBili  0.3  /  DBili  x   /  AST  36<H>  /  ALT  30  /  AlkPhos  81  09-12    PT/INR - ( 13 Sep 2024 01:04 )   PT: 11.5 sec;   INR: 1.02 ratio         PTT - ( 13 Sep 2024 01:04 )  PTT:35.2 sec      Urinalysis Basic - ( 13 Sep 2024 01:04 )    Color: x / Appearance: x / SG: x / pH: x  Gluc: 139 mg/dL / Ketone: x  / Bili: x / Urobili: x   Blood: x / Protein: x / Nitrite: x   Leuk Esterase: x / RBC: x / WBC x   Sq Epi: x / Non Sq Epi: x / Bacteria: x        Urinalysis with Rflx Culture (collected 12 Sep 2024 08:59)        RADIOLOGY & ADDITIONAL TESTS:  Results Reviewed:   Imaging Personally Reviewed:  Electrocardiogram Personally Reviewed:    COORDINATION OF CARE:  Care Discussed with Consultants/Other Providers [Y/N]:  Prior or Outpatient Records Reviewed [Y/N]:   300 Medicine Progress Note    Patient is a 84y old  Female who presents with a chief complaint of R IT fx (14 Sep 2024 06:14)      SUBJECTIVE / OVERNIGHT EVENTS:   no events over night     ADDITIONAL REVIEW OF SYSTEMS:    MEDICATIONS  (STANDING):  apixaban 2.5 milliGRAM(s) Oral every 12 hours  atenolol  Tablet 25 milliGRAM(s) Oral daily  ezetimibe 10 milliGRAM(s) Oral daily  influenza  Vaccine (HIGH DOSE) 0.5 milliLiter(s) IntraMuscular once  ketorolac   Injectable 15 milliGRAM(s) IV Push every 6 hours  lactated ringers. 1000 milliLiter(s) (30 mL/Hr) IV Continuous <Continuous>  ranolazine 500 milliGRAM(s) Oral two times a day  rosuvastatin 20 milliGRAM(s) Oral at bedtime  senna 2 Tablet(s) Oral at bedtime  sertraline 200 milliGRAM(s) Oral daily  sodium chloride 0.9%. 1000 milliLiter(s) (125 mL/Hr) IV Continuous <Continuous>    MEDICATIONS  (PRN):  melatonin 3 milliGRAM(s) Oral at bedtime PRN Sleep  oxyCODONE    IR 10 milliGRAM(s) Oral every 4 hours PRN Severe Pain (7 - 10)  oxyCODONE    IR 5 milliGRAM(s) Oral every 4 hours PRN Moderate Pain (4 - 6)    CAPILLARY BLOOD GLUCOSE        I&O's Summary    13 Sep 2024 07:01  -  14 Sep 2024 07:00  --------------------------------------------------------  IN: 860 mL / OUT: 1250 mL / NET: -390 mL        PHYSICAL EXAM:  Vital Signs Last 24 Hrs  T(C): 36.6 (14 Sep 2024 09:25), Max: 36.7 (13 Sep 2024 22:00)  T(F): 97.8 (14 Sep 2024 09:25), Max: 98.1 (13 Sep 2024 22:00)  HR: 67 (14 Sep 2024 09:25) (60 - 67)  BP: 103/41 (14 Sep 2024 09:25) (96/41 - 129/52)  BP(mean): --  RR: 18 (14 Sep 2024 09:25) (17 - 18)  SpO2: 93% (14 Sep 2024 09:25) (90% - 96%)    Parameters below as of 14 Sep 2024 09:25  Patient On (Oxygen Delivery Method): room air      CONSTITUTIONAL: NAD,   ENMT: Moist oral mucosa, no pharyngeal injection or exudates;   RESPIRATORY: Normal respiratory effort; lungs are clear to auscultation bilaterally  CARDIOVASCULAR: Regular rate and rhythm, normal S1 and S2,  No lower extremity edema; Rt hip dressing   ABDOMEN: Nontender to palpation, normoactive bowel sounds, no rebound/guarding;   PSYCH: A+O to person, place, and time; affect appropriate  NEUROLOGY: CN 2-12 are intact and symmetric; no gross sensory deficits   SKIN: No rashes;    LABS:                        9.5    9.34  )-----------( 173      ( 14 Sep 2024 05:32 )             30.0     09-14    138  |  103  |  16  ----------------------------<  113<H>  4.4   |  25  |  0.59    Ca    8.3<L>      14 Sep 2024 05:32  Phos  2.2     09-14  Mg     1.80     09-14      PT/INR - ( 13 Sep 2024 01:04 )   PT: 11.5 sec;   INR: 1.02 ratio         PTT - ( 13 Sep 2024 01:04 )  PTT:35.2 sec      Urinalysis Basic - ( 14 Sep 2024 05:32 )    Color: x / Appearance: x / SG: x / pH: x  Gluc: 113 mg/dL / Ketone: x  / Bili: x / Urobili: x   Blood: x / Protein: x / Nitrite: x   Leuk Esterase: x / RBC: x / WBC x   Sq Epi: x / Non Sq Epi: x / Bacteria: x        Urinalysis with Rflx Culture (collected 12 Sep 2024 08:59)          RADIOLOGY & ADDITIONAL TESTS:  Imaging from Last 24 Hours:    Electrocardiogram/QTc Interval:    COORDINATION OF CARE:  Care Discussed with Consultants/Other Providers:

## 2024-12-06 NOTE — BRIEF OPERATIVE NOTE - NSICDXBRIEFPROCEDURE_GEN_ALL_CORE_FT
PROCEDURES:  [facility] renal transplant 06-Dec-2024 00:48:40 R  donor renal transplant Miki Sewell

## 2024-12-06 NOTE — PHYSICAL THERAPY INITIAL EVALUATION ADULT - GENERAL OBSERVATIONS, REHAB EVAL
Chart reviewed events to date noted. Blood glucose reviewed. Pt tolerated 45min PT initial evaluation well. POD1 DDRT. Rec'd in bed in NAD, +any, JPx2, IV, eager for PT.

## 2024-12-06 NOTE — BRIEF OPERATIVE NOTE - OPERATION/FINDINGS
Procedure: Right  donor renal transplant (left donor kidney)  Indication: PKD     donor's LEFT kidney was prepared on back table in standard fashion.     Patient prepped and draped in sterile fashion. Curvilinear incision created on R side from 2cm lateral to ASIS to the pubic symphysis. Dissection continued carefully down to the preperitoneal space. Peritoneum swept medially and retroperitoneum was entered and dissected. R inferior epigastric artery was divided, spermatic cord swept medially. External iliac vessels were identified and cleared of overlying fibrofatty tissue. Minor accessory branches of artery and vein ligated with silk sutures. R renal vein was sutured to R external iliac vein in end to side fashion with 5-0 double armed prolene sutures in running fashion. R renal artery sutured to R external iliac artery in end to side fashion using 5-0 Prolene. Vein followed by artery were revascularized with transplant appearing well perfused. Urine was made. Attention turned to bladder. R posterolateral cystotomy was created and ureter was spatulated and anastomosed with 4-0 PDS in 2 layers over a ureteral stent. R donor kidney sat in R retroperitoneal space. Adequate hemostasis achieved. 19Fr Ryan drain #1 placed near anastomoses. Posterior fascia closed with #1 PDS in interrupted fashion. Anterior fascia closed with 0-0 PDS in running fashion. SARITHA #2 placed in SQ space. Scarpas closed with interrupted 2-0 Vicryl sutures. Skin closed with staples.

## 2024-12-06 NOTE — PHYSICAL THERAPY INITIAL EVALUATION ADULT - STRENGTHENING, PT EVAL
Impression: Myopia, bilateral: H52.13. OU. Plan: Dispensed new Rx, discussed changes with patient.
GOAL: In 2 weeks pt MMT will improve by 1/2 grade.

## 2024-12-07 LAB
ALBUMIN SERPL ELPH-MCNC: 3.4 G/DL — SIGNIFICANT CHANGE UP (ref 3.3–5)
ALP SERPL-CCNC: 49 U/L — SIGNIFICANT CHANGE UP (ref 40–120)
ALT FLD-CCNC: 13 U/L — SIGNIFICANT CHANGE UP (ref 10–45)
ANION GAP SERPL CALC-SCNC: 17 MMOL/L — SIGNIFICANT CHANGE UP (ref 5–17)
APTT BLD: 25.9 SEC — SIGNIFICANT CHANGE UP (ref 24.5–35.6)
AST SERPL-CCNC: 24 U/L — SIGNIFICANT CHANGE UP (ref 10–40)
BASOPHILS # BLD AUTO: 0.02 K/UL — SIGNIFICANT CHANGE UP (ref 0–0.2)
BASOPHILS NFR BLD AUTO: 0.1 % — SIGNIFICANT CHANGE UP (ref 0–2)
BILIRUB SERPL-MCNC: 0.2 MG/DL — SIGNIFICANT CHANGE UP (ref 0.2–1.2)
BUN SERPL-MCNC: 50 MG/DL — HIGH (ref 7–23)
CALCIUM SERPL-MCNC: 8.7 MG/DL — SIGNIFICANT CHANGE UP (ref 8.4–10.5)
CHLORIDE SERPL-SCNC: 98 MMOL/L — SIGNIFICANT CHANGE UP (ref 96–108)
CO2 SERPL-SCNC: 21 MMOL/L — LOW (ref 22–31)
CREAT SERPL-MCNC: 2.65 MG/DL — HIGH (ref 0.5–1.3)
EGFR: 23 ML/MIN/1.73M2 — LOW
EOSINOPHIL # BLD AUTO: 0 K/UL — SIGNIFICANT CHANGE UP (ref 0–0.5)
EOSINOPHIL NFR BLD AUTO: 0 % — SIGNIFICANT CHANGE UP (ref 0–6)
GLUCOSE BLDC GLUCOMTR-MCNC: 206 MG/DL — HIGH (ref 70–99)
GLUCOSE BLDC GLUCOMTR-MCNC: 215 MG/DL — HIGH (ref 70–99)
GLUCOSE BLDC GLUCOMTR-MCNC: 242 MG/DL — HIGH (ref 70–99)
GLUCOSE BLDC GLUCOMTR-MCNC: 271 MG/DL — HIGH (ref 70–99)
GLUCOSE SERPL-MCNC: 204 MG/DL — HIGH (ref 70–99)
HCT VFR BLD CALC: 24.6 % — LOW (ref 39–50)
HGB BLD-MCNC: 8.6 G/DL — LOW (ref 13–17)
IMM GRANULOCYTES NFR BLD AUTO: 0.8 % — SIGNIFICANT CHANGE UP (ref 0–0.9)
INR BLD: 0.88 RATIO — SIGNIFICANT CHANGE UP (ref 0.85–1.16)
LYMPHOCYTES # BLD AUTO: 0.08 K/UL — LOW (ref 1–3.3)
LYMPHOCYTES # BLD AUTO: 0.4 % — LOW (ref 13–44)
MAGNESIUM SERPL-MCNC: 2.4 MG/DL — SIGNIFICANT CHANGE UP (ref 1.6–2.6)
MCHC RBC-ENTMCNC: 30.8 PG — SIGNIFICANT CHANGE UP (ref 27–34)
MCHC RBC-ENTMCNC: 35 G/DL — SIGNIFICANT CHANGE UP (ref 32–36)
MCV RBC AUTO: 88.2 FL — SIGNIFICANT CHANGE UP (ref 80–100)
MONOCYTES # BLD AUTO: 0.43 K/UL — SIGNIFICANT CHANGE UP (ref 0–0.9)
MONOCYTES NFR BLD AUTO: 2.2 % — SIGNIFICANT CHANGE UP (ref 2–14)
NEUTROPHILS # BLD AUTO: 19.13 K/UL — HIGH (ref 1.8–7.4)
NEUTROPHILS NFR BLD AUTO: 96.5 % — HIGH (ref 43–77)
NRBC # BLD: 0 /100 WBCS — SIGNIFICANT CHANGE UP (ref 0–0)
PHOSPHATE SERPL-MCNC: 5 MG/DL — HIGH (ref 2.5–4.5)
PLATELET # BLD AUTO: 182 K/UL — SIGNIFICANT CHANGE UP (ref 150–400)
POTASSIUM SERPL-MCNC: 3.5 MMOL/L — SIGNIFICANT CHANGE UP (ref 3.5–5.3)
POTASSIUM SERPL-SCNC: 3.5 MMOL/L — SIGNIFICANT CHANGE UP (ref 3.5–5.3)
PROT SERPL-MCNC: 5.9 G/DL — LOW (ref 6–8.3)
PROTHROM AB SERPL-ACNC: 10.1 SEC — SIGNIFICANT CHANGE UP (ref 9.9–13.4)
RBC # BLD: 2.79 M/UL — LOW (ref 4.2–5.8)
RBC # FLD: 13.9 % — SIGNIFICANT CHANGE UP (ref 10.3–14.5)
SODIUM SERPL-SCNC: 136 MMOL/L — SIGNIFICANT CHANGE UP (ref 135–145)
TACROLIMUS SERPL-MCNC: 5 NG/ML — SIGNIFICANT CHANGE UP
WBC # BLD: 19.81 K/UL — HIGH (ref 3.8–10.5)
WBC # FLD AUTO: 19.81 K/UL — HIGH (ref 3.8–10.5)

## 2024-12-07 PROCEDURE — 99232 SBSQ HOSP IP/OBS MODERATE 35: CPT

## 2024-12-07 RX ORDER — LABETALOL 100 MG/1
100 TABLET, FILM COATED ORAL
Refills: 0 | Status: DISCONTINUED | OUTPATIENT
Start: 2024-12-07 | End: 2024-12-10

## 2024-12-07 RX ORDER — LIDOCAINE 40 MG/G
1 CREAM TOPICAL ONCE
Refills: 0 | Status: COMPLETED | OUTPATIENT
Start: 2024-12-07 | End: 2024-12-07

## 2024-12-07 RX ORDER — METHYLPREDNISOLONE SOD SUCC 125 MG
125 VIAL (EA) INJECTION ONCE
Refills: 0 | Status: COMPLETED | OUTPATIENT
Start: 2024-12-07 | End: 2024-12-07

## 2024-12-07 RX ORDER — DIPHENHYDRAMINE HCL 25 MG
50 CAPSULE ORAL ONCE
Refills: 0 | Status: COMPLETED | OUTPATIENT
Start: 2024-12-07 | End: 2024-12-07

## 2024-12-07 RX ORDER — TAMSULOSIN HYDROCHLORIDE 0.4 MG/1
0.4 CAPSULE ORAL AT BEDTIME
Refills: 0 | Status: DISCONTINUED | OUTPATIENT
Start: 2024-12-07 | End: 2024-12-10

## 2024-12-07 RX ORDER — ACETAMINOPHEN 500MG 500 MG/1
650 TABLET, COATED ORAL ONCE
Refills: 0 | Status: COMPLETED | OUTPATIENT
Start: 2024-12-07 | End: 2024-12-07

## 2024-12-07 RX ORDER — MYCOPHENOLATE MOFETIL 500 MG/1
500 TABLET ORAL
Refills: 0 | Status: DISCONTINUED | OUTPATIENT
Start: 2024-12-07 | End: 2024-12-10

## 2024-12-07 RX ADMIN — Medication 2: at 08:52

## 2024-12-07 RX ADMIN — NYSTATIN 500000 UNIT(S): 500000 TABLET, FILM COATED ORAL at 13:49

## 2024-12-07 RX ADMIN — NYSTATIN 500000 UNIT(S): 500000 TABLET, FILM COATED ORAL at 00:45

## 2024-12-07 RX ADMIN — FAMOTIDINE 20 MILLIGRAM(S): 20 TABLET, FILM COATED ORAL at 11:30

## 2024-12-07 RX ADMIN — TRAMADOL HYDROCHLORIDE 50 MILLIGRAM(S): 300 CAPSULE ORAL at 09:27

## 2024-12-07 RX ADMIN — POLYETHYLENE GLYCOL 3350 17 GRAM(S): 17 POWDER, FOR SOLUTION ORAL at 18:11

## 2024-12-07 RX ADMIN — Medication 2: at 18:09

## 2024-12-07 RX ADMIN — LIDOCAINE 1 PATCH: 40 CREAM TOPICAL at 11:30

## 2024-12-07 RX ADMIN — LABETALOL 100 MILLIGRAM(S): 100 TABLET, FILM COATED ORAL at 11:30

## 2024-12-07 RX ADMIN — ACETAMINOPHEN 500MG 650 MILLIGRAM(S): 500 TABLET, COATED ORAL at 13:09

## 2024-12-07 RX ADMIN — NYSTATIN 500000 UNIT(S): 500000 TABLET, FILM COATED ORAL at 21:23

## 2024-12-07 RX ADMIN — MYCOPHENOLATE MOFETIL 500 MILLIGRAM(S): 500 TABLET ORAL at 20:06

## 2024-12-07 RX ADMIN — TAMSULOSIN HYDROCHLORIDE 0.4 MILLIGRAM(S): 0.4 CAPSULE ORAL at 21:23

## 2024-12-07 RX ADMIN — Medication 1: at 21:23

## 2024-12-07 RX ADMIN — LIDOCAINE 1 PATCH: 40 CREAM TOPICAL at 20:00

## 2024-12-07 RX ADMIN — VALGANCICLOVIR HYDROCHLORIDE POWDER, 450 MILLIGRAM(S): 50 FOR SOLUTION ORAL at 11:29

## 2024-12-07 RX ADMIN — AMLODIPINE BESYLATE 5 MILLIGRAM(S): 10 TABLET ORAL at 06:13

## 2024-12-07 RX ADMIN — TRAMADOL HYDROCHLORIDE 50 MILLIGRAM(S): 300 CAPSULE ORAL at 10:00

## 2024-12-07 RX ADMIN — Medication 2: at 13:09

## 2024-12-07 RX ADMIN — TACROLIMUS 2 MILLIGRAM(S): 5 CAPSULE ORAL at 08:52

## 2024-12-07 RX ADMIN — ROSUVASTATIN CALCIUM 20 MILLIGRAM(S): 5 TABLET, FILM COATED ORAL at 21:23

## 2024-12-07 RX ADMIN — Medication 50 MILLIGRAM(S): at 13:09

## 2024-12-07 RX ADMIN — MYCOPHENOLATE MOFETIL 1 GRAM(S): 500 TABLET ORAL at 08:51

## 2024-12-07 RX ADMIN — Medication 125 MILLIGRAM(S): at 11:29

## 2024-12-07 RX ADMIN — NYSTATIN 500000 UNIT(S): 500000 TABLET, FILM COATED ORAL at 18:11

## 2024-12-07 RX ADMIN — CHLORHEXIDINE GLUCONATE 1 APPLICATION(S): 1.2 RINSE ORAL at 13:48

## 2024-12-07 RX ADMIN — LABETALOL 100 MILLIGRAM(S): 100 TABLET, FILM COATED ORAL at 18:11

## 2024-12-07 RX ADMIN — ALLOPURINOL 300 MILLIGRAM(S): 300 TABLET ORAL at 11:30

## 2024-12-07 RX ADMIN — NYSTATIN 500000 UNIT(S): 500000 TABLET, FILM COATED ORAL at 06:13

## 2024-12-07 RX ADMIN — Medication 1 TABLET(S): at 11:29

## 2024-12-07 RX ADMIN — Medication 2 TABLET(S): at 21:23

## 2024-12-07 NOTE — PROGRESS NOTE ADULT - ASSESSMENT
79 year old male with PMH of  HTN, left inguinal hernia repair, HLD, PCKD for past ~12 years recently with LISA for past 6 months not on HD presenting for DDRT.  Transplant nephrology consulted for management and immunosuppression post transplant.    Donor- 53 year old, COD CVA, KDPI 77%, Terminal Cr 1.46     1. s/p DDRT 12/5/24 (1a/1v/1u +stent). Allograft function with good UOP and Cr trending down well.   US transplanted kidney (15/5/24) No hydronephrosis or perinephric collections. Patent renal transplant artery vasculature.    2. Immunosuppression- Thymo Induction. on Envarsus for goal 8-10, Cellcept 1gm po bid and steroid taper    PPX: nystatin, bactrim, valcyte    3. HTN - on Norvasc 5mg and labetalol 100mg BID.

## 2024-12-07 NOTE — PROGRESS NOTE ADULT - ASSESSMENT
79 male with PMHx HTN, left inguinal hernia repair, HLD, PKD for past ~12 years recently with increased Cr for past 6 months, makes normal amount of urine, not on HD. Patient is now s/p DDRT 1a/1v/1u+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    [] DDRT 1a/1v/1u+stent POD#2  - Cr downtrending, excellent UO   - Strict I's & O's, JPx2, agarwal  - dc IVF   - flomax 0.4qhs   - Pain regimen, bowel regimen  - SCDs/IS  - Renal US: patent  - lidocaine patch for chronic back pain   - thymo 75/solu 125     [] IMMUNO  - Thymoglobulin induction with SST  - Env per level, MMF 500BID     [] HTN   - amlodipine 5mg qd, labetalol 100mg BID      []+ donor syphilis serology   - PCN G 2.4 x1 on 12/6, 2nd dose in 1 week, 3rd dose in 2 weeks

## 2024-12-08 LAB
ALBUMIN SERPL ELPH-MCNC: 3.7 G/DL — SIGNIFICANT CHANGE UP (ref 3.3–5)
ALBUMIN SERPL ELPH-MCNC: 3.9 G/DL — SIGNIFICANT CHANGE UP (ref 3.3–5)
ALP SERPL-CCNC: 51 U/L — SIGNIFICANT CHANGE UP (ref 40–120)
ALP SERPL-CCNC: 54 U/L — SIGNIFICANT CHANGE UP (ref 40–120)
ALT FLD-CCNC: 10 U/L — SIGNIFICANT CHANGE UP (ref 10–45)
ALT FLD-CCNC: 10 U/L — SIGNIFICANT CHANGE UP (ref 10–45)
ANION GAP SERPL CALC-SCNC: 14 MMOL/L — SIGNIFICANT CHANGE UP (ref 5–17)
ANION GAP SERPL CALC-SCNC: 17 MMOL/L — SIGNIFICANT CHANGE UP (ref 5–17)
APTT BLD: 24.6 SEC — SIGNIFICANT CHANGE UP (ref 24.5–35.6)
AST SERPL-CCNC: 17 U/L — SIGNIFICANT CHANGE UP (ref 10–40)
AST SERPL-CCNC: 17 U/L — SIGNIFICANT CHANGE UP (ref 10–40)
BASOPHILS # BLD AUTO: 0.01 K/UL — SIGNIFICANT CHANGE UP (ref 0–0.2)
BASOPHILS NFR BLD AUTO: 0.1 % — SIGNIFICANT CHANGE UP (ref 0–2)
BILIRUB SERPL-MCNC: 0.2 MG/DL — SIGNIFICANT CHANGE UP (ref 0.2–1.2)
BILIRUB SERPL-MCNC: 0.2 MG/DL — SIGNIFICANT CHANGE UP (ref 0.2–1.2)
BLD GP AB SCN SERPL QL: NEGATIVE — SIGNIFICANT CHANGE UP
BUN SERPL-MCNC: 49 MG/DL — HIGH (ref 7–23)
BUN SERPL-MCNC: 50 MG/DL — HIGH (ref 7–23)
CALCIUM SERPL-MCNC: 9.3 MG/DL — SIGNIFICANT CHANGE UP (ref 8.4–10.5)
CALCIUM SERPL-MCNC: 9.3 MG/DL — SIGNIFICANT CHANGE UP (ref 8.4–10.5)
CHLORIDE SERPL-SCNC: 98 MMOL/L — SIGNIFICANT CHANGE UP (ref 96–108)
CHLORIDE SERPL-SCNC: 98 MMOL/L — SIGNIFICANT CHANGE UP (ref 96–108)
CO2 SERPL-SCNC: 20 MMOL/L — LOW (ref 22–31)
CO2 SERPL-SCNC: 20 MMOL/L — LOW (ref 22–31)
CREAT SERPL-MCNC: 1.9 MG/DL — HIGH (ref 0.5–1.3)
CREAT SERPL-MCNC: 1.97 MG/DL — HIGH (ref 0.5–1.3)
EGFR: 34 ML/MIN/1.73M2 — LOW
EGFR: 35 ML/MIN/1.73M2 — LOW
EOSINOPHIL # BLD AUTO: 0 K/UL — SIGNIFICANT CHANGE UP (ref 0–0.5)
EOSINOPHIL NFR BLD AUTO: 0 % — SIGNIFICANT CHANGE UP (ref 0–6)
GLUCOSE BLDC GLUCOMTR-MCNC: 205 MG/DL — HIGH (ref 70–99)
GLUCOSE BLDC GLUCOMTR-MCNC: 212 MG/DL — HIGH (ref 70–99)
GLUCOSE BLDC GLUCOMTR-MCNC: 213 MG/DL — HIGH (ref 70–99)
GLUCOSE BLDC GLUCOMTR-MCNC: 237 MG/DL — HIGH (ref 70–99)
GLUCOSE SERPL-MCNC: 196 MG/DL — HIGH (ref 70–99)
GLUCOSE SERPL-MCNC: 200 MG/DL — HIGH (ref 70–99)
HCT VFR BLD CALC: 26.1 % — LOW (ref 39–50)
HGB BLD-MCNC: 8.8 G/DL — LOW (ref 13–17)
IMM GRANULOCYTES NFR BLD AUTO: 1 % — HIGH (ref 0–0.9)
INR BLD: 0.78 RATIO — LOW (ref 0.85–1.16)
LYMPHOCYTES # BLD AUTO: 0.05 K/UL — LOW (ref 1–3.3)
LYMPHOCYTES # BLD AUTO: 0.4 % — LOW (ref 13–44)
MAGNESIUM SERPL-MCNC: 2.3 MG/DL — SIGNIFICANT CHANGE UP (ref 1.6–2.6)
MCHC RBC-ENTMCNC: 29.6 PG — SIGNIFICANT CHANGE UP (ref 27–34)
MCHC RBC-ENTMCNC: 33.7 G/DL — SIGNIFICANT CHANGE UP (ref 32–36)
MCV RBC AUTO: 87.9 FL — SIGNIFICANT CHANGE UP (ref 80–100)
MONOCYTES # BLD AUTO: 0.4 K/UL — SIGNIFICANT CHANGE UP (ref 0–0.9)
MONOCYTES NFR BLD AUTO: 3 % — SIGNIFICANT CHANGE UP (ref 2–14)
NEUTROPHILS # BLD AUTO: 12.75 K/UL — HIGH (ref 1.8–7.4)
NEUTROPHILS NFR BLD AUTO: 95.5 % — HIGH (ref 43–77)
NRBC # BLD: 0 /100 WBCS — SIGNIFICANT CHANGE UP (ref 0–0)
PHOSPHATE SERPL-MCNC: 3.7 MG/DL — SIGNIFICANT CHANGE UP (ref 2.5–4.5)
PLATELET # BLD AUTO: 185 K/UL — SIGNIFICANT CHANGE UP (ref 150–400)
POTASSIUM SERPL-MCNC: 3.1 MMOL/L — LOW (ref 3.5–5.3)
POTASSIUM SERPL-MCNC: 3.6 MMOL/L — SIGNIFICANT CHANGE UP (ref 3.5–5.3)
POTASSIUM SERPL-SCNC: 3.1 MMOL/L — LOW (ref 3.5–5.3)
POTASSIUM SERPL-SCNC: 3.6 MMOL/L — SIGNIFICANT CHANGE UP (ref 3.5–5.3)
PROT SERPL-MCNC: 6.6 G/DL — SIGNIFICANT CHANGE UP (ref 6–8.3)
PROT SERPL-MCNC: 6.7 G/DL — SIGNIFICANT CHANGE UP (ref 6–8.3)
PROTHROM AB SERPL-ACNC: 9 SEC — LOW (ref 9.9–13.4)
RBC # BLD: 2.97 M/UL — LOW (ref 4.2–5.8)
RBC # FLD: 14.2 % — SIGNIFICANT CHANGE UP (ref 10.3–14.5)
RH IG SCN BLD-IMP: POSITIVE — SIGNIFICANT CHANGE UP
SODIUM SERPL-SCNC: 132 MMOL/L — LOW (ref 135–145)
SODIUM SERPL-SCNC: 135 MMOL/L — SIGNIFICANT CHANGE UP (ref 135–145)
TACROLIMUS SERPL-MCNC: 8.5 NG/ML — SIGNIFICANT CHANGE UP
WBC # BLD: 13.34 K/UL — HIGH (ref 3.8–10.5)
WBC # FLD AUTO: 13.34 K/UL — HIGH (ref 3.8–10.5)

## 2024-12-08 PROCEDURE — 99232 SBSQ HOSP IP/OBS MODERATE 35: CPT

## 2024-12-08 PROCEDURE — 93010 ELECTROCARDIOGRAM REPORT: CPT

## 2024-12-08 RX ORDER — METHYLPREDNISOLONE SOD SUCC 125 MG
60 VIAL (EA) INJECTION ONCE
Refills: 0 | Status: COMPLETED | OUTPATIENT
Start: 2024-12-08 | End: 2024-12-08

## 2024-12-08 RX ORDER — DIPHENHYDRAMINE HCL 25 MG
50 CAPSULE ORAL ONCE
Refills: 0 | Status: COMPLETED | OUTPATIENT
Start: 2024-12-08 | End: 2024-12-08

## 2024-12-08 RX ORDER — POTASSIUM CHLORIDE 600 MG/1
20 TABLET, EXTENDED RELEASE ORAL ONCE
Refills: 0 | Status: COMPLETED | OUTPATIENT
Start: 2024-12-08 | End: 2024-12-08

## 2024-12-08 RX ORDER — PREDNISONE 20 MG/1
TABLET ORAL
Refills: 0 | Status: DISCONTINUED | OUTPATIENT
Start: 2024-12-09 | End: 2024-12-09

## 2024-12-08 RX ORDER — ACETAMINOPHEN, DIPHENHYDRAMINE HCL, PHENYLEPHRINE HCL 325; 25; 5 MG/1; MG/1; MG/1
5 TABLET ORAL AT BEDTIME
Refills: 0 | Status: DISCONTINUED | OUTPATIENT
Start: 2024-12-08 | End: 2024-12-10

## 2024-12-08 RX ORDER — LIDOCAINE 40 MG/G
1 CREAM TOPICAL ONCE
Refills: 0 | Status: COMPLETED | OUTPATIENT
Start: 2024-12-08 | End: 2024-12-08

## 2024-12-08 RX ORDER — ACETAMINOPHEN 500MG 500 MG/1
650 TABLET, COATED ORAL ONCE
Refills: 0 | Status: COMPLETED | OUTPATIENT
Start: 2024-12-08 | End: 2024-12-08

## 2024-12-08 RX ORDER — POTASSIUM CHLORIDE 600 MG/1
20 TABLET, EXTENDED RELEASE ORAL ONCE
Refills: 0 | Status: DISCONTINUED | OUTPATIENT
Start: 2024-12-08 | End: 2024-12-08

## 2024-12-08 RX ORDER — PREDNISONE 20 MG/1
40 TABLET ORAL ONCE
Refills: 0 | Status: COMPLETED | OUTPATIENT
Start: 2024-12-09 | End: 2024-12-09

## 2024-12-08 RX ADMIN — POTASSIUM CHLORIDE 50 MILLIEQUIVALENT(S): 600 TABLET, EXTENDED RELEASE ORAL at 10:02

## 2024-12-08 RX ADMIN — MYCOPHENOLATE MOFETIL 500 MILLIGRAM(S): 500 TABLET ORAL at 08:02

## 2024-12-08 RX ADMIN — ALLOPURINOL 300 MILLIGRAM(S): 300 TABLET ORAL at 12:42

## 2024-12-08 RX ADMIN — TRAMADOL HYDROCHLORIDE 50 MILLIGRAM(S): 300 CAPSULE ORAL at 22:06

## 2024-12-08 RX ADMIN — NYSTATIN 500000 UNIT(S): 500000 TABLET, FILM COATED ORAL at 05:00

## 2024-12-08 RX ADMIN — NYSTATIN 500000 UNIT(S): 500000 TABLET, FILM COATED ORAL at 12:42

## 2024-12-08 RX ADMIN — Medication 2: at 12:42

## 2024-12-08 RX ADMIN — Medication 60 MILLIGRAM(S): at 13:16

## 2024-12-08 RX ADMIN — TRAMADOL HYDROCHLORIDE 50 MILLIGRAM(S): 300 CAPSULE ORAL at 23:00

## 2024-12-08 RX ADMIN — AMLODIPINE BESYLATE 5 MILLIGRAM(S): 10 TABLET ORAL at 04:59

## 2024-12-08 RX ADMIN — Medication 2: at 17:23

## 2024-12-08 RX ADMIN — CHLORHEXIDINE GLUCONATE 1 APPLICATION(S): 1.2 RINSE ORAL at 12:48

## 2024-12-08 RX ADMIN — Medication 1 TABLET(S): at 12:41

## 2024-12-08 RX ADMIN — TRAMADOL HYDROCHLORIDE 50 MILLIGRAM(S): 300 CAPSULE ORAL at 07:17

## 2024-12-08 RX ADMIN — TRAMADOL HYDROCHLORIDE 50 MILLIGRAM(S): 300 CAPSULE ORAL at 06:04

## 2024-12-08 RX ADMIN — MYCOPHENOLATE MOFETIL 500 MILLIGRAM(S): 500 TABLET ORAL at 20:02

## 2024-12-08 RX ADMIN — Medication 2: at 09:22

## 2024-12-08 RX ADMIN — LIDOCAINE 1 PATCH: 40 CREAM TOPICAL at 00:00

## 2024-12-08 RX ADMIN — TACROLIMUS 2 MILLIGRAM(S): 5 CAPSULE ORAL at 08:03

## 2024-12-08 RX ADMIN — LIDOCAINE 1 PATCH: 40 CREAM TOPICAL at 19:09

## 2024-12-08 RX ADMIN — NYSTATIN 500000 UNIT(S): 500000 TABLET, FILM COATED ORAL at 17:24

## 2024-12-08 RX ADMIN — ACETAMINOPHEN 500MG 650 MILLIGRAM(S): 500 TABLET, COATED ORAL at 13:16

## 2024-12-08 RX ADMIN — Medication 50 MILLIGRAM(S): at 13:16

## 2024-12-08 RX ADMIN — POTASSIUM CHLORIDE 50 MILLIEQUIVALENT(S): 600 TABLET, EXTENDED RELEASE ORAL at 08:02

## 2024-12-08 RX ADMIN — LABETALOL 100 MILLIGRAM(S): 100 TABLET, FILM COATED ORAL at 17:23

## 2024-12-08 RX ADMIN — LABETALOL 100 MILLIGRAM(S): 100 TABLET, FILM COATED ORAL at 04:59

## 2024-12-08 RX ADMIN — ACETAMINOPHEN, DIPHENHYDRAMINE HCL, PHENYLEPHRINE HCL 5 MILLIGRAM(S): 325; 25; 5 TABLET ORAL at 23:20

## 2024-12-08 RX ADMIN — LIDOCAINE 1 PATCH: 40 CREAM TOPICAL at 23:56

## 2024-12-08 RX ADMIN — FAMOTIDINE 20 MILLIGRAM(S): 20 TABLET, FILM COATED ORAL at 12:41

## 2024-12-08 RX ADMIN — ROSUVASTATIN CALCIUM 20 MILLIGRAM(S): 5 TABLET, FILM COATED ORAL at 20:02

## 2024-12-08 RX ADMIN — TAMSULOSIN HYDROCHLORIDE 0.4 MILLIGRAM(S): 0.4 CAPSULE ORAL at 20:02

## 2024-12-08 RX ADMIN — VALGANCICLOVIR HYDROCHLORIDE POWDER, 450 MILLIGRAM(S): 50 FOR SOLUTION ORAL at 12:41

## 2024-12-08 RX ADMIN — NYSTATIN 500000 UNIT(S): 500000 TABLET, FILM COATED ORAL at 23:55

## 2024-12-08 RX ADMIN — LIDOCAINE 1 PATCH: 40 CREAM TOPICAL at 13:15

## 2024-12-08 NOTE — PROVIDER CONTACT NOTE (OTHER) - ASSESSMENT
Pt a&o x4. VS as charted. No complaints of pain or discomfort. Denies any chest pain. Pt resting comfortably in bed.
2-3 times/wk

## 2024-12-08 NOTE — PROGRESS NOTE ADULT - ASSESSMENT
79 male with PMHx HTN, left inguinal hernia repair, HLD, PKD for past ~12 years recently with increased Cr for past 6 months, makes normal amount of urine, not on HD. Patient is now s/p DDRT 1a/1v/1u+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    [] DDRT 1a/1v/1u+stent POD#3  - Cr downtrending, excellent UO   - Strict I's & O's, JPx2, agarwal  - flomax 0.4qhs   - Pain regimen, bowel regimen  - SCDs/IS  - Renal US: patent  - lidocaine patch for chronic back pain   - thymo 75/solu 125     [] IMMUNO  - Thymoglobulin induction with SST  - Env per level, MMF 500BID     [] HTN   - amlodipine 5mg qd, labetalol 100mg BID      []+ donor syphilis serology   - PCN G 2.4 x1 on 12/6, 2nd dose in 1 week, 3rd dose in 2 weeks  79 male with PMHx HTN, left inguinal hernia repair, HLD, PKD for past ~12 years recently with increased Cr for past 6 months, makes normal amount of urine, not on HD. Patient is now s/p DDRT 1a/1v/1u+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    [] DDRT 1a/1v/1u+stent POD#3  - Cr downtrending, excellent UO   - Strict I's & O's, JPx2, agarwal  - flomax 0.4qhs   - Pain regimen, bowel regimen  - SCDs/IS  - Renal US: patent  - lidocaine patch for chronic back pain   - thymo 50/solu 125     [] IMMUNO  - Thymoglobulin induction with SST  - Env per level, MMF 500BID     [] HTN   - amlodipine 5mg qd, labetalol 100mg BID      []+ donor syphilis serology   - PCN G 2.4 x1 on 12/6, 2nd dose in 1 week, 3rd dose in 2 weeks     [] abnormal tele  - aflutter on tele 12/8, w/ 1st degree av block  - cards c/s

## 2024-12-08 NOTE — PROGRESS NOTE ADULT - ASSESSMENT
79 year old male with PMH of  HTN, left inguinal hernia repair, HLD, PCKD for past ~12 years recently with LISA for past 6 months not on HD presenting for DDRT.  Transplant nephrology consulted for management and immunosuppression post transplant.  Donor- 53 year old, COD CVA, KDPI 77%, Terminal Cr 1.46     1. s/p DDRT 12/5/24 (1a/1v/1u +stent). Allograft function with good UOP and Cr trending down well.   US transplanted kidney (15/5/24) No hydronephrosis or perinephric collections. Patent renal transplant artery vasculature.  2. Immunosuppression- Thymo Induction . 3rd dose today ( plan on giving only 3mg/kg)  on Envarsus for goal 8-10, Cellcept 1gm po bid and steroid taper . Ppx with nystatin, bactrim, valcyte  3. HTN - on Norvasc 5mg and labetalol 100mg BID.   4. Hypokalemia- K needs to be repleted   5. Aflutter overnight briefly on tele . ECG w/ known 1st degree AV block - Cardiology consulted.

## 2024-12-08 NOTE — PROVIDER CONTACT NOTE (OTHER) - SITUATION
notified by at [facility] at [phone] that pt may be in aflutter- requesting EKG notified by at [employer] at 0435 that pt may have been in aflutter at 0350- requesting EKG

## 2024-12-08 NOTE — PROVIDER CONTACT NOTE (OTHER) - REASON
notified by tele notified by at [facility] at [phone] that pt may be in aflutter- requesting EKG notified by at [employer] at 0435 that pt may have been in aflutter at 0350- requesting EKG

## 2024-12-09 ENCOUNTER — TRANSCRIPTION ENCOUNTER (OUTPATIENT)
Age: 79
End: 2024-12-09

## 2024-12-09 LAB
ALBUMIN SERPL ELPH-MCNC: 3.4 G/DL — SIGNIFICANT CHANGE UP (ref 3.3–5)
ALP SERPL-CCNC: 47 U/L — SIGNIFICANT CHANGE UP (ref 40–120)
ALT FLD-CCNC: 11 U/L — SIGNIFICANT CHANGE UP (ref 10–45)
ANION GAP SERPL CALC-SCNC: 15 MMOL/L — SIGNIFICANT CHANGE UP (ref 5–17)
APTT BLD: 23.9 SEC — LOW (ref 24.5–35.6)
AST SERPL-CCNC: 12 U/L — SIGNIFICANT CHANGE UP (ref 10–40)
BASOPHILS # BLD AUTO: 0 K/UL — SIGNIFICANT CHANGE UP (ref 0–0.2)
BASOPHILS NFR BLD AUTO: 0 % — SIGNIFICANT CHANGE UP (ref 0–2)
BILIRUB SERPL-MCNC: 0.2 MG/DL — SIGNIFICANT CHANGE UP (ref 0.2–1.2)
BUN SERPL-MCNC: 44 MG/DL — HIGH (ref 7–23)
CALCIUM SERPL-MCNC: 8.7 MG/DL — SIGNIFICANT CHANGE UP (ref 8.4–10.5)
CHLORIDE SERPL-SCNC: 103 MMOL/L — SIGNIFICANT CHANGE UP (ref 96–108)
CO2 SERPL-SCNC: 18 MMOL/L — LOW (ref 22–31)
CREAT SERPL-MCNC: 1.81 MG/DL — HIGH (ref 0.5–1.3)
EGFR: 37 ML/MIN/1.73M2 — LOW
EOSINOPHIL # BLD AUTO: 0 K/UL — SIGNIFICANT CHANGE UP (ref 0–0.5)
EOSINOPHIL NFR BLD AUTO: 0 % — SIGNIFICANT CHANGE UP (ref 0–6)
GLUCOSE BLDC GLUCOMTR-MCNC: 185 MG/DL — HIGH (ref 70–99)
GLUCOSE BLDC GLUCOMTR-MCNC: 216 MG/DL — HIGH (ref 70–99)
GLUCOSE BLDC GLUCOMTR-MCNC: 248 MG/DL — HIGH (ref 70–99)
GLUCOSE BLDC GLUCOMTR-MCNC: 316 MG/DL — HIGH (ref 70–99)
GLUCOSE SERPL-MCNC: 173 MG/DL — HIGH (ref 70–99)
HCT VFR BLD CALC: 25.9 % — LOW (ref 39–50)
HGB BLD-MCNC: 9 G/DL — LOW (ref 13–17)
IMM GRANULOCYTES NFR BLD AUTO: 0.6 % — SIGNIFICANT CHANGE UP (ref 0–0.9)
LYMPHOCYTES # BLD AUTO: 0.06 K/UL — LOW (ref 1–3.3)
LYMPHOCYTES # BLD AUTO: 0.7 % — LOW (ref 13–44)
MAGNESIUM SERPL-MCNC: 2.2 MG/DL — SIGNIFICANT CHANGE UP (ref 1.6–2.6)
MCHC RBC-ENTMCNC: 30.8 PG — SIGNIFICANT CHANGE UP (ref 27–34)
MCHC RBC-ENTMCNC: 34.7 G/DL — SIGNIFICANT CHANGE UP (ref 32–36)
MCV RBC AUTO: 88.7 FL — SIGNIFICANT CHANGE UP (ref 80–100)
MONOCYTES # BLD AUTO: 0.52 K/UL — SIGNIFICANT CHANGE UP (ref 0–0.9)
MONOCYTES NFR BLD AUTO: 5.9 % — SIGNIFICANT CHANGE UP (ref 2–14)
NEUTROPHILS # BLD AUTO: 8.15 K/UL — HIGH (ref 1.8–7.4)
NEUTROPHILS NFR BLD AUTO: 92.8 % — HIGH (ref 43–77)
NRBC # BLD: 0 /100 WBCS — SIGNIFICANT CHANGE UP (ref 0–0)
PHOSPHATE SERPL-MCNC: 3.4 MG/DL — SIGNIFICANT CHANGE UP (ref 2.5–4.5)
PLATELET # BLD AUTO: 156 K/UL — SIGNIFICANT CHANGE UP (ref 150–400)
POTASSIUM SERPL-MCNC: 3.6 MMOL/L — SIGNIFICANT CHANGE UP (ref 3.5–5.3)
POTASSIUM SERPL-SCNC: 3.6 MMOL/L — SIGNIFICANT CHANGE UP (ref 3.5–5.3)
PROT SERPL-MCNC: 5.5 G/DL — LOW (ref 6–8.3)
RBC # BLD: 2.92 M/UL — LOW (ref 4.2–5.8)
RBC # FLD: 14.2 % — SIGNIFICANT CHANGE UP (ref 10.3–14.5)
SODIUM SERPL-SCNC: 136 MMOL/L — SIGNIFICANT CHANGE UP (ref 135–145)
TACROLIMUS SERPL-MCNC: 5.3 NG/ML — SIGNIFICANT CHANGE UP
WBC # BLD: 8.78 K/UL — SIGNIFICANT CHANGE UP (ref 3.8–10.5)
WBC # FLD AUTO: 8.78 K/UL — SIGNIFICANT CHANGE UP (ref 3.8–10.5)

## 2024-12-09 PROCEDURE — 99232 SBSQ HOSP IP/OBS MODERATE 35: CPT | Mod: GC

## 2024-12-09 PROCEDURE — 99223 1ST HOSP IP/OBS HIGH 75: CPT

## 2024-12-09 RX ORDER — POTASSIUM CHLORIDE 600 MG/1
20 TABLET, EXTENDED RELEASE ORAL ONCE
Refills: 0 | Status: COMPLETED | OUTPATIENT
Start: 2024-12-09 | End: 2024-12-09

## 2024-12-09 RX ORDER — POTASSIUM CHLORIDE 600 MG/1
20 TABLET, EXTENDED RELEASE ORAL
Refills: 0 | Status: DISCONTINUED | OUTPATIENT
Start: 2024-12-09 | End: 2024-12-09

## 2024-12-09 RX ORDER — TAMSULOSIN HYDROCHLORIDE 0.4 MG/1
0.4 CAPSULE ORAL
Qty: 30 | Refills: 10 | Status: ACTIVE | COMMUNITY
Start: 2024-12-09 | End: 1900-01-01

## 2024-12-09 RX ORDER — CHLORHEXIDINE GLUCONATE 4 %
5 LIQUID (ML) TOPICAL
Qty: 30 | Refills: 11 | Status: ACTIVE | COMMUNITY
Start: 2024-12-09 | End: 1900-01-01

## 2024-12-09 RX ORDER — PREDNISONE 20 MG/1
10 TABLET ORAL ONCE
Refills: 0 | Status: COMPLETED | OUTPATIENT
Start: 2024-12-10 | End: 2024-12-10

## 2024-12-09 RX ADMIN — NYSTATIN 500000 UNIT(S): 500000 TABLET, FILM COATED ORAL at 17:08

## 2024-12-09 RX ADMIN — Medication 2: at 08:36

## 2024-12-09 RX ADMIN — MYCOPHENOLATE MOFETIL 500 MILLIGRAM(S): 500 TABLET ORAL at 20:03

## 2024-12-09 RX ADMIN — Medication 4: at 17:08

## 2024-12-09 RX ADMIN — TRAMADOL HYDROCHLORIDE 50 MILLIGRAM(S): 300 CAPSULE ORAL at 09:12

## 2024-12-09 RX ADMIN — CHLORHEXIDINE GLUCONATE 1 APPLICATION(S): 1.2 RINSE ORAL at 12:49

## 2024-12-09 RX ADMIN — TRAMADOL HYDROCHLORIDE 50 MILLIGRAM(S): 300 CAPSULE ORAL at 21:12

## 2024-12-09 RX ADMIN — LABETALOL 100 MILLIGRAM(S): 100 TABLET, FILM COATED ORAL at 17:08

## 2024-12-09 RX ADMIN — POTASSIUM CHLORIDE 20 MILLIEQUIVALENT(S): 600 TABLET, EXTENDED RELEASE ORAL at 08:46

## 2024-12-09 RX ADMIN — Medication 2: at 12:46

## 2024-12-09 RX ADMIN — AMLODIPINE BESYLATE 5 MILLIGRAM(S): 10 TABLET ORAL at 05:37

## 2024-12-09 RX ADMIN — TAMSULOSIN HYDROCHLORIDE 0.4 MILLIGRAM(S): 0.4 CAPSULE ORAL at 21:49

## 2024-12-09 RX ADMIN — ROSUVASTATIN CALCIUM 20 MILLIGRAM(S): 5 TABLET, FILM COATED ORAL at 21:50

## 2024-12-09 RX ADMIN — NYSTATIN 500000 UNIT(S): 500000 TABLET, FILM COATED ORAL at 12:45

## 2024-12-09 RX ADMIN — MYCOPHENOLATE MOFETIL 500 MILLIGRAM(S): 500 TABLET ORAL at 08:36

## 2024-12-09 RX ADMIN — ALLOPURINOL 300 MILLIGRAM(S): 300 TABLET ORAL at 12:45

## 2024-12-09 RX ADMIN — FAMOTIDINE 20 MILLIGRAM(S): 20 TABLET, FILM COATED ORAL at 12:45

## 2024-12-09 RX ADMIN — Medication 2 TABLET(S): at 21:49

## 2024-12-09 RX ADMIN — NYSTATIN 500000 UNIT(S): 500000 TABLET, FILM COATED ORAL at 23:56

## 2024-12-09 RX ADMIN — NYSTATIN 500000 UNIT(S): 500000 TABLET, FILM COATED ORAL at 05:37

## 2024-12-09 RX ADMIN — Medication 1 TABLET(S): at 12:45

## 2024-12-09 RX ADMIN — LABETALOL 100 MILLIGRAM(S): 100 TABLET, FILM COATED ORAL at 05:37

## 2024-12-09 RX ADMIN — PREDNISONE 40 MILLIGRAM(S): 20 TABLET ORAL at 05:37

## 2024-12-09 RX ADMIN — TRAMADOL HYDROCHLORIDE 50 MILLIGRAM(S): 300 CAPSULE ORAL at 20:11

## 2024-12-09 RX ADMIN — TACROLIMUS 2 MILLIGRAM(S): 5 CAPSULE ORAL at 08:36

## 2024-12-09 RX ADMIN — POLYETHYLENE GLYCOL 3350 17 GRAM(S): 17 POWDER, FOR SOLUTION ORAL at 12:45

## 2024-12-09 RX ADMIN — ACETAMINOPHEN, DIPHENHYDRAMINE HCL, PHENYLEPHRINE HCL 5 MILLIGRAM(S): 325; 25; 5 TABLET ORAL at 21:50

## 2024-12-09 RX ADMIN — TRAMADOL HYDROCHLORIDE 50 MILLIGRAM(S): 300 CAPSULE ORAL at 10:15

## 2024-12-09 RX ADMIN — VALGANCICLOVIR HYDROCHLORIDE POWDER, 450 MILLIGRAM(S): 50 FOR SOLUTION ORAL at 12:45

## 2024-12-09 NOTE — DIETITIAN INITIAL EVALUATION ADULT - PERTINENT MEDS FT
MEDICATIONS  (STANDING):  allopurinol 300 milliGRAM(s) Oral daily  amLODIPine   Tablet 5 milliGRAM(s) Oral daily  chlorhexidine 2% Cloths 1 Application(s) Topical daily  dextrose 5%. 1000 milliLiter(s) (50 mL/Hr) IV Continuous <Continuous>  dextrose 5%. 1000 milliLiter(s) (100 mL/Hr) IV Continuous <Continuous>  dextrose 50% Injectable 25 Gram(s) IV Push once  dextrose 50% Injectable 12.5 Gram(s) IV Push once  dextrose 50% Injectable 25 Gram(s) IV Push once  dextrose Oral Gel 15 Gram(s) Oral once  famotidine    Tablet 20 milliGRAM(s) Oral daily  glucagon  Injectable 1 milliGRAM(s) IntraMuscular once  influenza  Vaccine (HIGH DOSE) 0.5 milliLiter(s) IntraMuscular once  insulin lispro (ADMELOG) corrective regimen sliding scale   SubCutaneous three times a day before meals  insulin lispro (ADMELOG) corrective regimen sliding scale   SubCutaneous at bedtime  labetalol 100 milliGRAM(s) Oral two times a day  melatonin 5 milliGRAM(s) Oral at bedtime  mycophenolate mofetil 500 milliGRAM(s) Oral <User Schedule>  nystatin    Suspension 792821 Unit(s) Swish and Swallow four times a day  polyethylene glycol 3350 17 Gram(s) Oral daily  rosuvastatin 20 milliGRAM(s) Oral at bedtime  senna 2 Tablet(s) Oral at bedtime  tacrolimus ER Tablet (ENVARSUS XR) 2 milliGRAM(s) Oral <User Schedule>  tamsulosin 0.4 milliGRAM(s) Oral at bedtime  trimethoprim   80 mG/sulfamethoxazole 400 mG 1 Tablet(s) Oral daily  valGANciclovir 450 milliGRAM(s) Oral daily    MEDICATIONS  (PRN):  ondansetron Injectable 4 milliGRAM(s) IV Push every 6 hours PRN Nausea and/or Vomiting  traMADol 25 milliGRAM(s) Oral every 6 hours PRN Moderate Pain (4 - 6)  traMADol 50 milliGRAM(s) Oral every 8 hours PRN Severe Pain (7 - 10)

## 2024-12-09 NOTE — DISCHARGE NOTE PROVIDER - NSDCMRMEDTOKEN_GEN_ALL_CORE_FT
allopurinol 300 mg oral tablet: 1 tab(s) orally once a day  AmLODIPine:   amLODIPine 10 mg oral tablet: 1 tab(s) orally once a day  furosemide 40 mg oral tablet: 1 tab(s) orally once a day  labetalol 100 mg oral tablet: 1 tab(s) orally 3 times a day  multivitamin: 1 each once a day  rosuvastatin 20 mg oral tablet: 1 tab(s) orally once a day   allopurinol 300 mg oral tablet: 1 tab(s) orally once a day  amLODIPine 10 mg oral tablet: 1 tab(s) orally once a day  famotidine 20 mg oral tablet: 1 tab(s) orally once a day  labetalol 100 mg oral tablet: 1 tab(s) orally 2 times a day  mycophenolate mofetil 250 mg oral capsule: 500 milligram(s) orally 2 times a day  nystatin 100,000 units/mL oral suspension: 5 milliliter(s) orally 4 times a day  predniSONE 5 mg oral tablet: 1 tab(s) orally once a day  rosuvastatin 20 mg oral tablet: 1 tab(s) orally once a day (at bedtime)  senna leaf extract oral tablet: 2 tab(s) orally once a day (at bedtime)  sulfamethoxazole-trimethoprim 400 mg-80 mg oral tablet: 1 tab(s) orally once a day  tacrolimus 1 mg oral tablet, extended release: 3 tab(s) orally once a day  tamsulosin 0.4 mg oral capsule: 1 cap(s) orally once a day (at bedtime)  valGANciclovir 450 mg oral tablet: 1 tab(s) orally once a day

## 2024-12-09 NOTE — DIETITIAN INITIAL EVALUATION ADULT - NUTRITION DIAGNOSIS
yes... - Patient with history of cerebral aneurysm, Neurology consult appreciated.   - Called patient's out patient Neurologist Dr. Willam Zimmer and requested for the most recent clinic note and MRI/MRA brain results

## 2024-12-09 NOTE — CONSULT NOTE ADULT - SUBJECTIVE AND OBJECTIVE BOX
Patient seen and evaluated @ 9 am on   Chief Complaint: atrial flutter (asymptomatic)    HPI:  79 male with PMHx HTN, left inguinal hernia repair, HLD, PKD for past ~12 years recently with increased Cr for past 6 months presents for possible DDRT.     Patient states he is in his usual state of health. Recent admission 10/26/24 for possible kidney transplant offer however patient was noted to have leukocytosis with WBC 15.36 and case was cancelled. Infectious workup unrevealing and repeat WBC 8.33 on 24. No other recent hospital admissions or antibiotic use. Denies recent blood transfusions. Excellent exercise tolerance, able to walk 3 miles without stopping and without any chest pain or shortness of breath . Makes normal amount of urine at home. Denies other complaints, no headaches, fevers,  dizziness, chest pain, SOB, nausea/vomiting, abdominal pain.     24 Nuclear exercise stress test: Normal myocardial perfusion scan, with no evidence of infarction or inducible ischemia. Qualitative Perfusion: medium sized, mild defect(s) in the basal inferoseptal, inferior and basal inferolateral walls that are fixed, predominantly corrects with prone imaging suggestive of diaphragmatic attenuation artifact. The left ventricle is normal in function and moderately enlarged in size. The post stress left ventricular EF is 69 %. The stress end diastolic volume is 121 ml and systolic volume is 38 ml.Patient achieved 7 METS, which is consistent with good exercise capacity.The Duke Treadmill Score is 5.00, which is consistent with low risk (=5) for future cardiac events    ECHO 24: Left ventricular cavity is normal in size. Left ventricular systolic function is normal with an ejection fraction of 59 % by Smith's method of disks. Borderline left ventricular hypertrophy. The left atrium is moderately dilated. There is mild calcification of the mitral valve annulus. Moderate mitral regurgitation. Trileaflet aortic valve with normal systolic excursion. Mild aortic  (05 Dec 2024 16:10)    PMH:     PSH:     Medications:   allopurinol 300 milliGRAM(s) Oral daily  amLODIPine   Tablet 5 milliGRAM(s) Oral daily  chlorhexidine 2% Cloths 1 Application(s) Topical daily  dextrose 5%. 1000 milliLiter(s) IV Continuous <Continuous>  dextrose 5%. 1000 milliLiter(s) IV Continuous <Continuous>  dextrose 50% Injectable 25 Gram(s) IV Push once  dextrose 50% Injectable 12.5 Gram(s) IV Push once  dextrose 50% Injectable 25 Gram(s) IV Push once  dextrose Oral Gel 15 Gram(s) Oral once  famotidine    Tablet 20 milliGRAM(s) Oral daily  glucagon  Injectable 1 milliGRAM(s) IntraMuscular once  influenza  Vaccine (HIGH DOSE) 0.5 milliLiter(s) IntraMuscular once  insulin lispro (ADMELOG) corrective regimen sliding scale   SubCutaneous three times a day before meals  insulin lispro (ADMELOG) corrective regimen sliding scale   SubCutaneous at bedtime  labetalol 100 milliGRAM(s) Oral two times a day  melatonin 5 milliGRAM(s) Oral at bedtime  mycophenolate mofetil 500 milliGRAM(s) Oral <User Schedule>  nystatin    Suspension 480681 Unit(s) Swish and Swallow four times a day  ondansetron Injectable 4 milliGRAM(s) IV Push every 6 hours PRN  polyethylene glycol 3350 17 Gram(s) Oral daily  predniSONE   Tablet 10 milliGRAM(s) Oral once  rosuvastatin 20 milliGRAM(s) Oral at bedtime  senna 2 Tablet(s) Oral at bedtime  tacrolimus ER Tablet (ENVARSUS XR) 2 milliGRAM(s) Oral <User Schedule>  tamsulosin 0.4 milliGRAM(s) Oral at bedtime  traMADol 25 milliGRAM(s) Oral every 6 hours PRN  traMADol 50 milliGRAM(s) Oral every 8 hours PRN  trimethoprim   80 mG/sulfamethoxazole 400 mG 1 Tablet(s) Oral daily  valGANciclovir 450 milliGRAM(s) Oral daily    Allergies:  No Known Allergies    FAMILY HISTORY:    Social History:  Smoking:  Alcohol:  Drugs:    Review of Systems:  [ ]Unable to obtain  Constitutional: No fever, chills, fatigue, or changes in weight  HEENT: No blurry vision, eye pain, headache, runny nose, or sore throat  Respiratory: No shortness of breath, cough, or wheezing  Cardiovascular: No chest pain or palpitations  Gastrointestinal: No nausea, vomiting, diarrhea, or abdominal pain  Genitourinary: No dysuria or incontinence  Extremities: No lower extremity swelling  Neurologic: No focal findings  Lymphatic: No lymphadenopathy   Skin: No rash  Psychiatry: No anxiety or depression  10 point review of systems is otherwise negative except as mentioned above            Physical Exam:  T(C): 36.8 (12-10-24 @ 00:30), Max: 36.8 (12-10-24 @ 00:30)  HR: 100 (12-10-24 @ 00:30) (73 - 100)  BP: 138/71 (12-10-24 @ 00:30) (137/69 - 156/73)  RR: 17 (12-10-24 @ 00:30) (17 - 18)  SpO2: 98% (12-10-24 @ 00:30) (97% - 99%)  Wt(kg): --     @ :01  -   @ 07:00  --------------------------------------------------------  IN: 600 mL / OUT: 2920 mL / NET: -2320 mL     @ 07:01  -  12-10 @ 00:57  --------------------------------------------------------  IN: 570 mL / OUT: 2495 mL / NET: -1925 mL      Daily Height in cm: 165.1 (09 Dec 2024 05:19)    Daily Weight in k.1 (09 Dec 2024 05:19)    Appearance: Normal, well groomed, NAD  Eyes: PERRLA, EOMI, pink conjunctiva, no scleral icterus   HENT: Normal oral mucosa  Cardiovascular: RRR, S1, S2, no murmur, rub, or gallop; no edema; no JVD  Procedural Access Site: Clean, dry, intact, without hematoma  Respiratory: Clear to auscultation bilaterally  Gastrointestinal: Soft, non-tender, non-distended, BS+  Musculoskeletal: No clubbing or joint deformity   Neurologic: No focal weakness  Lymphatic: No lymphadenopathy  Psychiatry: AAOx3 with appropriate mood and affect  Skin: No rashes, ecchymoses, or cyanosis    Cardiovascular Diagnostic Testing:  ECG: sinus rhythm at 89 bpm, first degree AV delay, RBBB    Echo:    Stress Testing:    Cath:    Interpretation of Telemetry:    Imaging:    Labs:                        9.0    8.78  )-----------( 156      ( 09 Dec 2024 06:37 )             25.9         136  |  103  |  44[H]  ----------------------------<  173[H]  3.6   |  18[L]  |  1.81[H]    Ca    8.7      09 Dec 2024 06:43  Phos  3.4       Mg     2.2         TPro  5.5[L]  /  Alb  3.4  /  TBili  0.2  /  DBili  x   /  AST  12  /  ALT  11  /  AlkPhos  47  12-    PT/INR - ( 08 Dec 2024 06:43 )   PT: 9.0 sec;   INR: 0.78 ratio         PTT - ( 09 Dec 2024 06:43 )  PTT:23.9 sec            
[facility] DIVISION OF KIDNEY DISEASES AND HYPERTENSION -- INITIAL CONSULT NOTE  --------------------------------------------------------------------------------    HPI: 79-year-old male with PMHx HTN, left inguinal hernia repair, HLD, PCKD for past ~12 years recently with LISA for past 6 months not on HD presenting for DDRT. Patient states he is in his usual state of health. Recent admission 10/26/24 for possible kidney transplant offer however patient was noted to have leukocytosis with WBC 15.36 and case was cancelled. Infectious workup unrevealing and repeat WBC 8.33 on 11/4/24. No other recent hospital admissions or antibiotic use. Denies recent blood transfusions. Excellent exercise tolerance, able to walk 3 miles without stopping and without any chest pain or shortness of breath . Makes normal amount of urine at home. Denies other complaints, no headaches, fevers,  dizziness, chest pain, SOB, nausea/vomiting, abdominal pain.  Patient is now s/p DDRT 1a/1v/1u+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 Transplant nephrology consulted for management and immunosuppression post transplant.         PAST HISTORY  --------------------------------------------------------------------------------  PAST MEDICAL & SURGICAL HISTORY:    FAMILY HISTORY:    Social History:      ALLERGIES & MEDICATIONS  --------------------------------------------------------------------------------  Allergies    No Known Allergies    Intolerances      Standing Inpatient Medications  allopurinol 300 milliGRAM(s) Oral daily  amLODIPine   Tablet 5 milliGRAM(s) Oral daily  antithymocyte globulin rabbit (peripheral) IVPB w/additives 75 milliGRAM(s) IV Intermittent once  chlorhexidine 2% Cloths 1 Application(s) Topical daily  dextrose 5%. 1000 milliLiter(s) IV Continuous <Continuous>  dextrose 5%. 1000 milliLiter(s) IV Continuous <Continuous>  dextrose 50% Injectable 25 Gram(s) IV Push once  dextrose 50% Injectable 12.5 Gram(s) IV Push once  dextrose 50% Injectable 25 Gram(s) IV Push once  dextrose Oral Gel 15 Gram(s) Oral once  glucagon  Injectable 1 milliGRAM(s) IntraMuscular once  influenza  Vaccine (HIGH DOSE) 0.5 milliLiter(s) IntraMuscular once  insulin lispro (ADMELOG) corrective regimen sliding scale   SubCutaneous every 6 hours  methylPREDNISolone sodium succinate IVPB 250 milliGRAM(s) IV Intermittent once  multiple electrolytes Injection Type 1 1000 milliLiter(s) IV Continuous <Continuous>  penicillin   G benzathine Injectable 2.4 Million Unit(s) IntraMuscular once  polyethylene glycol 3350 17 Gram(s) Oral daily  rosuvastatin 20 milliGRAM(s) Oral at bedtime  senna 2 Tablet(s) Oral at bedtime    PRN Inpatient Medications  ondansetron Injectable 4 milliGRAM(s) IV Push every 6 hours PRN  traMADol 25 milliGRAM(s) Oral every 6 hours PRN  traMADol 50 milliGRAM(s) Oral every 8 hours PRN      REVIEW OF SYSTEMS  --------------------------------------------------------------------------------  Gen: No fatigue, fevers/chills  Skin: No rashes  Head/Eyes/Ears/Mouth: No headache; Normal hearing; Normal vision   Respiratory: No dyspnea, cough  CV: No chest pain, PND, orthopnea  GI: No abdominal pain, diarrhea, constipation, nausea, vomiting  : mild incision site tenderness  MSK: No edema  Neuro: No dizziness/lightheadedness  Heme: No bruising or bleeding  Psych: No significant nervousness, anxiety, stress, depression    Rest of the ROS as stated in HPI    VITALS/PHYSICAL EXAM  --------------------------------------------------------------------------------  T(C): 36.7 (12-06-24 @ 14:05), Max: 36.8 (12-06-24 @ 13:02)  HR: 89 (12-06-24 @ 14:05) (86 - 105)  BP: 135/67 (12-06-24 @ 14:05) (122/57 - 152/71)  RR: 18 (12-06-24 @ 14:05) (14 - 18)  SpO2: 99% (12-06-24 @ 14:05) (96% - 100%)  Wt(kg): --  Height (cm): 165.1 (12-05-24 @ 20:04)  Weight (kg): 60.9 (12-05-24 @ 20:04)  BMI (kg/m2): 22.3 (12-05-24 @ 20:04)  BSA (m2): 1.67 (12-05-24 @ 20:04)      12-05-24 @ 07:01  -  12-06-24 @ 07:00  --------------------------------------------------------  IN: 1215 mL / OUT: 1055 mL / NET: 160 mL    12-06-24 @ 07:01  -  12-06-24 @ 15:52  --------------------------------------------------------  IN: 1501 mL / OUT: 1235 mL / NET: 266 mL      Physical Exam:  Gen: NAD, able to speak in full sentences   HEENT: PERRL, MMM   Pulm: CTA B/L, no crackles   CV: RRR, S1S2+  Abd: +BS, soft  Transplant: No tenderness, swelling  : No suprapubic tenderness  MSK: no edema   Psych: Normal affect and mood  Skin: Warm  Access:    LABS/STUDIES  --------------------------------------------------------------------------------              8.1    23.77 >-----------<  206      [12-06-24 @ 06:37]              23.9     141  |  98  |  62  ----------------------------<  202      [12-06-24 @ 06:37]  3.7   |  18  |  4.58        Ca     7.9     [12-06-24 @ 06:37]      Mg     2.1     [12-06-24 @ 06:37]      Phos  6.8     [12-06-24 @ 06:37]    TPro  5.8  /  Alb  3.5  /  TBili  0.2  /  DBili  x   /  AST  31  /  ALT  13  /  AlkPhos  49  [12-06-24 @ 06:37]    PT/INR: PT 10.9 , INR 0.95       [12-06-24 @ 06:37]  PTT: 27.5       [12-06-24 @ 06:37]      Creatinine Trend:  SCr 4.58 [12-06 @ 06:37]  SCr 4.82 [12-06 @ 01:30]  SCr 4.90 [12-05 @ 16:06]    Urinalysis - [12-06-24 @ 06:37]      Color  / Appearance  / SG  / pH       Gluc 202 / Ketone   / Bili  / Urobili        Blood  / Protein  / Leuk Est  / Nitrite       RBC  / WBC  / Hyaline  / Gran  / Sq Epi  / Non Sq Epi  / Bacteria     HBsAb 305.2      [12-05-24 @ 16:06]  HBsAg Nonreact      [12-05-24 @ 16:06]  HBcAb Reactive      [12-05-24 @ 16:06]  HCV 0.04, Nonreact      [12-05-24 @ 16:06]  HIV Nonreact      [12-05-24 @ 16:06]      Tacrolimus  Cyclosporine  Sirolimus  Mycophenolate  BK PCR  CMV PCR  Parvo PCR  EBV PCR

## 2024-12-09 NOTE — DISCHARGE NOTE PROVIDER - CARE PROVIDER_API CALL
Clayton Atkinson  Transplant Surgery  [address] [place] [address]  Phone: [phone]  Fax: [phone]  Follow Up Time:     Jacinto Franz  Surgery  [address] [place] [address]  Phone: [phone]  Fax: [phone]  Follow Up Time:     Daksha Justin  Nephrology  [address] [place] [address]  Phone: [phone]  Fax: [phone]  Follow Up Time:

## 2024-12-09 NOTE — DIETITIAN INITIAL EVALUATION ADULT - REASON INDICATOR FOR ASSESSMENT
Nutrition consult warranted for:  Pt seen for post kidney transplant recipient nutrition evaluation per department protocol.   Information obtained from: electronic medical record and patient  Chart reviewed, events noted.

## 2024-12-09 NOTE — DIETITIAN INITIAL EVALUATION ADULT - ORAL INTAKE PTA/DIET HISTORY
Pt reports having a good appetite and PO intake PTA; consuming >75% of most meals. Follows regular diet. Pt denies any known food allergies; reports lactose intolerance. Pt denies any micronutrient supplementation at home. Denies any difficulty chewing/swallowing at this time.

## 2024-12-09 NOTE — DIETITIAN INITIAL EVALUATION ADULT - ADD RECOMMEND
1) Continue consistent carbohydrate diet.   2) RD will add PowerAde electrolyte drink to meal trays to help meet increased post-Txp fluid needs of ~3L. 3) Reinforce post-transplant nutrition therapy and food safety guidelines in-house and prior to discharge.   4) Discharge diet: Continue as above. Recommend follow up visit with Transplant MD and outpatient RD for dietary modifications as warranted.  5) Monitor PO intake,  Urine Output, GI tolerance, skin integrity, and labs. RD remains available if needed, pt is aware.

## 2024-12-09 NOTE — PHARMACY EDUCATION NOTE - TRANSPLANT MEDICATIONS 6
Pt reports she is feeling much better than when she arrived to hospital.
Reports she has been coughing up dark-colored sputum today.  Up ad yony in
room.  VSS.   at suppertime, improved from 483 at lunch today.  Will
continue to monitor. Sulfamethoxazolfe/Trimethoprim

## 2024-12-09 NOTE — PROGRESS NOTE ADULT - ASSESSMENT
79-year-old male with PMHx HTN, left inguinal hernia repair, HLD, PCKD for past ~12 years recently with LISA for past 6 months not on HD presenting for DDRT s/p DDRT 12/5/24.  Transplant nephrology consulted for management and immunosuppression post transplant.    1. s/p DDRT 12/5/24 (1a/1v/1u +stent)  - Induction agent: Thymoglobulin  - SCr on admission prior to DDRT 4.99. SCr today is improved at 1.81  - Urinalysis is clear with 100 protein.   - US transplanted kidney (15/5/24) No hydronephrosis or perinephric collections. Patent renal transplant artery vasculature.  - Monitor labs and I/Os. Avoid nephrotoxins including, ACE/ARB, NSAIDs, contrast, etc. Dose medications as per eGFR.  - TOV today.     2. Immunosuppression  - On Envarsus 2mg  - FK level 5.3 today. Check tacrolimus level 30mins prior to AM dose daily.   - prednisone 10mg and MMF 500mg BID.   - PPX: nystatin, bactrim, valcyte    3. HTN - continue Norvasc 5mg and home labetalol 100mg BID.     If you have any questions, please feel free to contact me:  Jenni Scanlon MD PGY-4  Nephrology Fellow  Pager [phone] / [employer] Teams (Preferred)  (After 5pm or on weekends please page the on-call fellow)

## 2024-12-09 NOTE — DISCHARGE NOTE PROVIDER - NSDCDCMDCOMP_GEN_ALL_CORE
Physical Therapy Discharge     Visit Type: Daily Treatment Note -  Discharge Summary  Visit: 8  Referring Provider: Bernabe Peace MD  Medical Diagnosis (from order): Diagnosis Information    Diagnosis  721.3 (ICD-9-CM) - M47.817 (ICD-10-CM) - Lumbosacral spondylosis without myelopathy         SUBJECTIVE                                                                                                               He's doing pretty good. He is ok with discharge today.   Current Functional Limitations: Walking long distances    Pain / Symptoms  - Pain rating (out of 10): Current: 0       OBJECTIVE                                                                                                                                    Outcome/Assessments  Outcome Measures:   OSWESTRY Total Scored: 7  OSWESTRY Total Possible Score: 50  OSWESTRY Score Calculated: 14 %  (0-20% = minimal disability; 20-40% = moderate disability; 40-60% = severe disability; 60-80% = crippled; % = bed bound) see flowsheet for additional documentation       Treatment     Therapeutic Exercise  Sit to stand     Skilled input: verbal instruction/cues and tactile instruction/cues    Writer verbally educated and received verbal consent for hand placement, positioning of patient, and techniques to be performed today from patient for clothing adjustments for techniques, hand placement and palpation for techniques and therapist position for techniques as described above and how they are pertinent to the patient's plan of care.    Home Exercise Program  Supine Lower Trunk Rotation - 2 x daily - 10 reps - 10 seconds hold  Supine Bridge - 1 x daily - 3 sets - 10 reps  Supine March with Posterior Pelvic Tilt - 1 x daily - 3 sets - 10 repetitions  Straight leg raise   Double knee to chest        ASSESSMENT                                                                                                          To date the patient has made gains as  expected.    Patient is continuing to make progress in his pain and function. He is ready to take on his care at home.   Education:   - Results of above outlined education: Demonstrates understanding and Verbalizes understanding    PLAN                                                                                                                           Suggestions for next session as indicated: Progress per plan of care      Goals  Long Term Goals: to be met by end of plan of care  1. Patient independent with modified and progressed home exercise program. -- Met  2. Patient will have a 60% improvement of symptoms at the conclusion of therapy.  -- Met; 75%  3. Patient will increase their lumbar range of motion to 75% in order to pick objects up off the floor. -- Met  4. Patient will increase their hip extensor strength to 4+/5 in order to lift groceries/laundry basket. -- Met  5. Patient will complain of pain no higher than 4/10 in order to ambulate at least 30 minutes. -- Not met; 9/10       Therapy procedure time and total treatment time can be found documented on the Time Entry flowsheet     This document is complete and the patient is ready for discharge.

## 2024-12-09 NOTE — PROGRESS NOTE ADULT - ASSESSMENT
79 male with PMHx HTN, left inguinal hernia repair, HLD, PKD for past ~12 years recently with increased Cr for past 6 months, makes normal amount of urine, not on HD. Patient is now s/p DDRT 1a/1v/1u+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    [] DDRT 1a/1v/1u+stent POD#4  - Cr downtrending, excellent UO   - Strict I's & O's, JPx2, agarwal  - flomax 0.4qhs   - Pain regimen, bowel regimen  - SCDs/IS  - Renal US: patent  - lidocaine patch for chronic back pain   - completed thymo, on pred taper  - d/c any     [] IMMUNO  - Thymoglobulin induction with SST  - Env per level, MMF 500BID     [] HTN   - amlodipine 5mg qd, labetalol 100mg BID      []+ donor syphilis serology   - PCN G 2.4 x1 on 12/6, 2nd dose in 1 week, 3rd dose in 2 weeks     [] abnormal tele  - aflutter on tele 12/8, w/ 1st degree av block  - cards c/s

## 2024-12-09 NOTE — DISCHARGE NOTE PROVIDER - PROVIDER TOKENS
PROVIDER:[TOKEN:[[id number]]],PROVIDER:[TOKEN:[[id number]]],PROVIDER:[TOKEN:[[id number]]]

## 2024-12-09 NOTE — DIETITIAN INITIAL EVALUATION ADULT - PERSON TAUGHT/METHOD
Provided education on post transplant nutrition therapy and food safety guidelines for transplant recipients. Discussed importance of thoroughly washing all fresh fruits/vegetables, importance of avoiding uncooked/raw/unpasteurized foods, avoiding pre-made deli/buffet/salad bar meals. Foods recommended as healthy well balanced diet and importance of adequate protein intakes for proper post-surgical healing discussed. Reviewed recommendations to avoid grapefruit, pomegranate and star fruit while taking immunosuppressant medication. Reviewed recommendations for moderate intake of sodium and carbohydrates with transplant medications. Reviewed effect of steroids on BG levels and importance of limiting concentrated sweets. Pt was receptive and expressed understanding. All questions answered. Provided nutrition handout: USDA Food Safety for Transplant Recipients booklet./verbal instruction/written material/teach back - (Patient repeats in own words)/patient instructed

## 2024-12-09 NOTE — DIETITIAN INITIAL EVALUATION ADULT - OTHER INFO
- s/p DDRT POD #3.  -- Deltasone, Tacrolimus, Cellcept transplant meds ordered  -- Urine output per flow sheets 600 ml thus far (12/09), 2,845 ml (12/08), 3,780 ml (12/07)

## 2024-12-09 NOTE — DISCHARGE NOTE PROVIDER - NSDCACTIVITY_GEN_ALL_CORE
Follow Instructions Provided by your Surgical Team No heavy lifting/straining/Follow Instructions Provided by your Surgical Team/Activity as tolerated

## 2024-12-09 NOTE — DISCHARGE NOTE PROVIDER - NSDCFUADDAPPT_GEN_ALL_CORE_FT
FOLLOW-UP:  1. Follow up in Transplant Clinic on _________.  2. Follow up with Surgeon in 4-6 weeks for removal of ureteral stent.  FOLLOW-UP:  1. Repeat labs on Thursday 12/12 and follow up in transplant clinic next week.   2. Follow up with Surgeon in 4-6 weeks for removal of ureteral stent.  FOLLOW-UP:  1. Repeat labs on Thursday 12/12 and follow up in transplant clinic next week.   2. Follow up with Surgeon in 4-6 weeks for removal of ureteral stent.

## 2024-12-09 NOTE — DIETITIAN INITIAL EVALUATION ADULT - PERTINENT LABORATORY DATA
12-09    136  |  103  |  44[H]  ----------------------------<  173[H]  3.6   |  18[L]  |  1.81[H]    Ca    8.7      09 Dec 2024 06:43  Phos  3.4     12-09  Mg     2.2     12-09    TPro  5.5[L]  /  Alb  3.4  /  TBili  0.2  /  DBili  x   /  AST  12  /  ALT  11  /  AlkPhos  47  12-09  POCT Blood Glucose.: 248 mg/dL (12-09-24 @ 12:44)

## 2024-12-09 NOTE — DIETITIAN INITIAL EVALUATION ADULT - PHYSCIAL ASSESSMENT
Weight Hx Per:  - Source: patient   - UBW: 140 pounds   - Reported weight changes: no changes in weight reported     [facility] no available weights to assess.     Current Admission Weights:  - Dosing weight: 139.12 pounds/63.1 kg (12/09)  - Daily weight: 63.1 kg (12/09)    Weight Change:  - no changes noted     **  Will continue to monitor weight trends as available/able.     IBW: 136 pounds   %IBW: 101%

## 2024-12-09 NOTE — DISCHARGE NOTE PROVIDER - NSDCCPCAREPLAN_GEN_ALL_CORE_FT
PRINCIPAL DISCHARGE DIAGNOSIS  Diagnosis: -donor kidney transplant recipient  Assessment and Plan of Treatment:   DC instructions:   Renal txp recipient:   - Pain: You will have some pain after surgery. Take pain medication as prescribed. Avoid Aspirin, Motrin and Ibuprofen, unless intstructed by the team. You should NOT drive while on narcotic medications.   -Incision: Do NOT take bath until your incision is healed and you are cleared by your surgeon. You may shower. Gently clense the area around your insciion with only soap and water and pat dry. Check it daily.  Keep incision uncoverfed and open to air.   -Activity: avoid strenouous activity/excercise and heavy lifting for the first 6-8 weeks after surgery. You should not lift anything over 10 lbs. You should not drive until cleared by your transplant team. Avoid straining. Use stool softners as needed. Stop stool softners if diarrhea develops.   -Follow FOOD SAFETY instruction provided to you in your patient education guide bookelet   -Women:  using adequate contraception is highly recommended. In [facility], women who receive a transplant should not become pregnant for at least 18 months after transplant.   -Skin care: Transplant medication can increase your risk for skin cancer. Avoid extended exposure to the sun and wear SPH 30 sunscreen or higher. Visit a dermologist at least once a year.   -You should have an annual eye exam.   -You should never smoke.   Call  the Translant team if  you experience any of the following:   [] Fever of 100.3F (38C) or above  [] Surgical incision is bleeding, red or warm to touch or there's discharge from the incision  [] Worsening abdominal pain, nausea or vomiting  [] Shortness of breath  [] Diffuculty with urinating such as burning, frequency or urgency, blood in urine   Immunosupression  - Transplant recipients are at risk for developing infection because immune system is lowered due to transplant medications.   - Avoid contact with sick people; practice good hand hygiene;   - Take medications as directed by your translant team. Never stop taking medications unless instructed by your transplant physicia

## 2024-12-09 NOTE — DISCHARGE NOTE PROVIDER - CARE PROVIDERS DIRECT ADDRESSES
,[email or web address] [email or web address]

## 2024-12-09 NOTE — DISCHARGE NOTE PROVIDER - HOSPITAL COURSE
HPI: 79 male with PMHx HTN, left inguinal hernia repair, HLD, PKD for past ~12 years recently with increased Cr for past 6 months, makes normal amount of urine, not on HD. Patient is now s/p DDRT 1a/1v/1u+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 Renal Doppler patent.       Pt progressed well from a surgical perspective. Tolerated regular diet, had bowel function, had good pain control with minimal narcotics, and ambulated well with PT.  She/He tolerated all of the new immunosuppression medication regimen.  Medication/diet education was provided regularly by Transplant Pharmacy and Nutrition teams.    She/He was followed closely by our multidisciplinary transplant team:  Surgeons, Hepatologists, ID, Nephrologists, Pharmacists, [facility] [place], ACPs, RNs, SW, Coordinators, Dieticians, PT/OT and was deemed safe for discharge with the following plan:     D/C plan:    [] DDRT 1a/1v/1u+stent   - Env ____, MMF 500mg BID, prednisone taper  - PPX: Nystatin/Bactrim/Valcyte/Pepcid  - Completed Thymoglobulin induction  - discharge with JP____****    [] HTN   - amlodipine 5mg qd, labetalol 100mg BID      []+ donor syphilis serology   - PCN G 2.4 x1 on 12/6, 2nd dose in 1 week, 3rd dose in 2 weeks     [] abnormal tele  - aflutter on tele 12/8, w/ 1st degree av block  - cards c/s: _______.      FOLLOW-UP:  1. Follow up in Transplant Clinic on _________.  2. Follow up with Surgeon in 4-6 weeks for removal of ureteral stent.      HPI: 79 male with PMHx HTN, left inguinal hernia repair, HLD, PKD for past ~12 years recently with increased Cr for past 6 months, makes normal amount of urine, not on HD. Patient is now s/p DDRT 1a/1v/1u+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 Renal Doppler patent.       Pt progressed well from a surgical perspective. Tolerated regular diet, had bowel function, had good pain control with minimal narcotics, and ambulated well with PT.  He tolerated all of the new immunosuppression medication regimen.  Medication/diet education was provided regularly by Transplant Pharmacy and Nutrition teams.    He was followed closely by our multidisciplinary transplant team:  Surgeons, Hepatologists, ID, Nephrologists, Pharmacists, [place], ACPs, RNs, SW, Coordinators, Dieticians, PT/OT and was deemed safe for discharge with the following plan:     D/C plan:  [] DDRT 1a/1v/1u+stent   - Env 3 MMF 500mg BID, pred 5  - PPX: Nystatin/Bactrim/Valcyte/Pepcid  - Completed Thymoglobulin induction  - discharge with SARITHA drains x2     [] HTN   - amlodipine 10mg qd, labetalol 100mg BID      []+ donor syphilis serology   - PCN G 2.4 x1 on 12/6, 2nd dose in 1 week, 3rd dose in 2 weeks     [] abnormal tele  - aflutter on tele 12/8, w/ 1st degree av block, EKG normal   - cardiology recommended continuing current regimen of amlodipine and labetalol    FOLLOW-UP:  1. Follow up on Thursday 12/12 for labs and visit in Transplant Clinic next week.   2. Follow up with Surgeon in 4-6 weeks for removal of ureteral stent.

## 2024-12-09 NOTE — CONSULT NOTE ADULT - ASSESSMENT
79-year-old male with PMHx HTN, left inguinal hernia repair, HLD, PCKD for past ~12 years recently with LISA for past 6 months not on HD presenting for DDRT.  Transplant nephrology consulted for management and immunosuppression post transplant.    1. s/p DDRT 12/5/24 (1a/1v/1u +stent)  - Induction agent: Thymoglobulin 75mg   - SCr on admission prior to DDRT 4.99. SCr today is elevated/stable at 4.58.   - Urinalysis is clear with 100 protein.   - US transplanted kidney (15/5/24) No hydronephrosis or perinephric collections. Patent renal transplant artery vasculature.  - Monitor labs and I/Os. Avoid nephrotoxins including, ACE/ARB, NSAIDs, contrast, etc. Dose medications as per eGFR.     2. Immunosuppression  - Hold envarsus today as patient received thymoglobulin. Start Envarsus 2mg tomorrow   - IV methylprednisolone 250mg   -  PPX: nystatin, bactrim, valcyte    3. HTN - start Norvasc 5mg and home labetalol 100mg BID.     If you have any questions, please feel free to contact me:  Jenni Scanlon MD PGY-4  Nephrology Fellow  Pager [phone] / [employer] Teams (Preferred)  (After 5pm or on weekends please page the on-call fellow) 
79 year-old renal transplant recipient, seen to have brief atrial flutter on POD 2. He was asymptomatic.     Continue to monitor.  Continue current regimen.  If further atrial arrhythmia are seen, consider cardioselective beta-blocker (currently receiving labetalol).  If PAF or flutter or seen, consider anticoagulation.

## 2024-12-09 NOTE — DIETITIAN INITIAL EVALUATION ADULT - NSFNSGIIOFT_GEN_A_CORE
- Pt denies nausea, vomiting, diarrhea, or constipation.   - Last BM: 12/09; ordered for Miralax and Senna

## 2024-12-10 ENCOUNTER — TRANSCRIPTION ENCOUNTER (OUTPATIENT)
Age: 79
End: 2024-12-10

## 2024-12-10 VITALS
RESPIRATION RATE: 18 BRPM | OXYGEN SATURATION: 98 % | TEMPERATURE: 99 F | HEART RATE: 80 BPM | DIASTOLIC BLOOD PRESSURE: 66 MMHG | SYSTOLIC BLOOD PRESSURE: 108 MMHG

## 2024-12-10 LAB
ALBUMIN SERPL ELPH-MCNC: 3.4 G/DL — SIGNIFICANT CHANGE UP (ref 3.3–5)
ALP SERPL-CCNC: 45 U/L — SIGNIFICANT CHANGE UP (ref 40–120)
ALT FLD-CCNC: 9 U/L — LOW (ref 10–45)
ANION GAP SERPL CALC-SCNC: 15 MMOL/L — SIGNIFICANT CHANGE UP (ref 5–17)
APTT BLD: 24.6 SEC — SIGNIFICANT CHANGE UP (ref 24.5–35.6)
AST SERPL-CCNC: 15 U/L — SIGNIFICANT CHANGE UP (ref 10–40)
BASOPHILS # BLD AUTO: 0.01 K/UL — SIGNIFICANT CHANGE UP (ref 0–0.2)
BASOPHILS NFR BLD AUTO: 0.1 % — SIGNIFICANT CHANGE UP (ref 0–2)
BILIRUB SERPL-MCNC: 0.2 MG/DL — SIGNIFICANT CHANGE UP (ref 0.2–1.2)
BUN SERPL-MCNC: 52 MG/DL — HIGH (ref 7–23)
CALCIUM SERPL-MCNC: 8.8 MG/DL — SIGNIFICANT CHANGE UP (ref 8.4–10.5)
CHLORIDE SERPL-SCNC: 102 MMOL/L — SIGNIFICANT CHANGE UP (ref 96–108)
CO2 SERPL-SCNC: 19 MMOL/L — LOW (ref 22–31)
CREAT SERPL-MCNC: 1.83 MG/DL — HIGH (ref 0.5–1.3)
EGFR: 37 ML/MIN/1.73M2 — LOW
EOSINOPHIL # BLD AUTO: 0.06 K/UL — SIGNIFICANT CHANGE UP (ref 0–0.5)
EOSINOPHIL NFR BLD AUTO: 0.7 % — SIGNIFICANT CHANGE UP (ref 0–6)
GLUCOSE BLDC GLUCOMTR-MCNC: 173 MG/DL — HIGH (ref 70–99)
GLUCOSE SERPL-MCNC: 126 MG/DL — HIGH (ref 70–99)
HCT VFR BLD CALC: 25.7 % — LOW (ref 39–50)
HGB BLD-MCNC: 8.9 G/DL — LOW (ref 13–17)
IMM GRANULOCYTES NFR BLD AUTO: 0.5 % — SIGNIFICANT CHANGE UP (ref 0–0.9)
INR BLD: 0.84 RATIO — LOW (ref 0.85–1.16)
LYMPHOCYTES # BLD AUTO: 0.18 K/UL — LOW (ref 1–3.3)
LYMPHOCYTES # BLD AUTO: 2.1 % — LOW (ref 13–44)
MAGNESIUM SERPL-MCNC: 2.1 MG/DL — SIGNIFICANT CHANGE UP (ref 1.6–2.6)
MCHC RBC-ENTMCNC: 30 PG — SIGNIFICANT CHANGE UP (ref 27–34)
MCHC RBC-ENTMCNC: 34.6 G/DL — SIGNIFICANT CHANGE UP (ref 32–36)
MCV RBC AUTO: 86.5 FL — SIGNIFICANT CHANGE UP (ref 80–100)
MONOCYTES # BLD AUTO: 0.8 K/UL — SIGNIFICANT CHANGE UP (ref 0–0.9)
MONOCYTES NFR BLD AUTO: 9.3 % — SIGNIFICANT CHANGE UP (ref 2–14)
NEUTROPHILS # BLD AUTO: 7.48 K/UL — HIGH (ref 1.8–7.4)
NEUTROPHILS NFR BLD AUTO: 87.3 % — HIGH (ref 43–77)
NRBC # BLD: 0 /100 WBCS — SIGNIFICANT CHANGE UP (ref 0–0)
PHOSPHATE SERPL-MCNC: 2.8 MG/DL — SIGNIFICANT CHANGE UP (ref 2.5–4.5)
PLATELET # BLD AUTO: 152 K/UL — SIGNIFICANT CHANGE UP (ref 150–400)
POTASSIUM SERPL-MCNC: 3.1 MMOL/L — LOW (ref 3.5–5.3)
POTASSIUM SERPL-SCNC: 3.1 MMOL/L — LOW (ref 3.5–5.3)
PROT SERPL-MCNC: 5.6 G/DL — LOW (ref 6–8.3)
PROTHROM AB SERPL-ACNC: 9.6 SEC — LOW (ref 9.9–13.4)
RBC # BLD: 2.97 M/UL — LOW (ref 4.2–5.8)
RBC # FLD: 14 % — SIGNIFICANT CHANGE UP (ref 10.3–14.5)
SODIUM SERPL-SCNC: 136 MMOL/L — SIGNIFICANT CHANGE UP (ref 135–145)
TACROLIMUS SERPL-MCNC: 4.3 NG/ML — SIGNIFICANT CHANGE UP
WBC # BLD: 8.57 K/UL — SIGNIFICANT CHANGE UP (ref 3.8–10.5)
WBC # FLD AUTO: 8.57 K/UL — SIGNIFICANT CHANGE UP (ref 3.8–10.5)

## 2024-12-10 PROCEDURE — 87635 SARS-COV-2 COVID-19 AMP PRB: CPT

## 2024-12-10 PROCEDURE — 80053 COMPREHEN METABOLIC PANEL: CPT

## 2024-12-10 PROCEDURE — 87522 HEPATITIS C REVRS TRNSCRPJ: CPT

## 2024-12-10 PROCEDURE — 97161 PT EVAL LOW COMPLEX 20 MIN: CPT

## 2024-12-10 PROCEDURE — 80197 ASSAY OF TACROLIMUS: CPT

## 2024-12-10 PROCEDURE — 82803 BLOOD GASES ANY COMBINATION: CPT

## 2024-12-10 PROCEDURE — 85610 PROTHROMBIN TIME: CPT

## 2024-12-10 PROCEDURE — 93005 ELECTROCARDIOGRAM TRACING: CPT

## 2024-12-10 PROCEDURE — 85025 COMPLETE CBC W/AUTO DIFF WBC: CPT

## 2024-12-10 PROCEDURE — 86803 HEPATITIS C AB TEST: CPT

## 2024-12-10 PROCEDURE — 85730 THROMBOPLASTIN TIME PARTIAL: CPT

## 2024-12-10 PROCEDURE — C9399: CPT

## 2024-12-10 PROCEDURE — 71045 X-RAY EXAM CHEST 1 VIEW: CPT

## 2024-12-10 PROCEDURE — 82962 GLUCOSE BLOOD TEST: CPT

## 2024-12-10 PROCEDURE — 87340 HEPATITIS B SURFACE AG IA: CPT

## 2024-12-10 PROCEDURE — 36415 COLL VENOUS BLD VENIPUNCTURE: CPT

## 2024-12-10 PROCEDURE — C2617: CPT

## 2024-12-10 PROCEDURE — 85018 HEMOGLOBIN: CPT

## 2024-12-10 PROCEDURE — 86706 HEP B SURFACE ANTIBODY: CPT

## 2024-12-10 PROCEDURE — 83605 ASSAY OF LACTIC ACID: CPT

## 2024-12-10 PROCEDURE — 83735 ASSAY OF MAGNESIUM: CPT

## 2024-12-10 PROCEDURE — 82947 ASSAY GLUCOSE BLOOD QUANT: CPT

## 2024-12-10 PROCEDURE — 76776 US EXAM K TRANSPL W/DOPPLER: CPT

## 2024-12-10 PROCEDURE — 99232 SBSQ HOSP IP/OBS MODERATE 35: CPT | Mod: GC

## 2024-12-10 PROCEDURE — 86901 BLOOD TYPING SEROLOGIC RH(D): CPT

## 2024-12-10 PROCEDURE — 86704 HEP B CORE ANTIBODY TOTAL: CPT

## 2024-12-10 PROCEDURE — 86900 BLOOD TYPING SEROLOGIC ABO: CPT

## 2024-12-10 PROCEDURE — 84132 ASSAY OF SERUM POTASSIUM: CPT

## 2024-12-10 PROCEDURE — C1889: CPT

## 2024-12-10 PROCEDURE — 97116 GAIT TRAINING THERAPY: CPT

## 2024-12-10 PROCEDURE — 82330 ASSAY OF CALCIUM: CPT

## 2024-12-10 PROCEDURE — 84100 ASSAY OF PHOSPHORUS: CPT

## 2024-12-10 PROCEDURE — C1769: CPT

## 2024-12-10 PROCEDURE — 97530 THERAPEUTIC ACTIVITIES: CPT

## 2024-12-10 PROCEDURE — 87389 HIV-1 AG W/HIV-1&-2 AB AG IA: CPT

## 2024-12-10 PROCEDURE — 85014 HEMATOCRIT: CPT

## 2024-12-10 PROCEDURE — 86850 RBC ANTIBODY SCREEN: CPT

## 2024-12-10 PROCEDURE — 84295 ASSAY OF SERUM SODIUM: CPT

## 2024-12-10 PROCEDURE — 82435 ASSAY OF BLOOD CHLORIDE: CPT

## 2024-12-10 RX ORDER — AMLODIPINE BESYLATE 10 MG/1
1 TABLET ORAL
Qty: 0 | Refills: 0 | DISCHARGE
Start: 2024-12-10

## 2024-12-10 RX ORDER — TACROLIMUS 5 MG/1
1 CAPSULE ORAL ONCE
Refills: 0 | Status: COMPLETED | OUTPATIENT
Start: 2024-12-10 | End: 2024-12-10

## 2024-12-10 RX ORDER — NYSTATIN 500000 [USP'U]/1
5 TABLET, FILM COATED ORAL
Qty: 0 | Refills: 0 | DISCHARGE
Start: 2024-12-10

## 2024-12-10 RX ORDER — ALLOPURINOL 300 MG/1
1 TABLET ORAL
Qty: 0 | Refills: 0 | DISCHARGE
Start: 2024-12-10

## 2024-12-10 RX ORDER — TACROLIMUS 5 MG/1
3 CAPSULE ORAL
Qty: 0 | Refills: 0 | DISCHARGE
Start: 2024-12-10

## 2024-12-10 RX ORDER — PREDNISONE 20 MG/1
1 TABLET ORAL
Qty: 30 | Refills: 0
Start: 2024-12-10 | End: 2025-01-08

## 2024-12-10 RX ORDER — VALGANCICLOVIR HYDROCHLORIDE POWDER, 50 MG/ML
1 FOR SOLUTION ORAL
Qty: 0 | Refills: 0 | DISCHARGE
Start: 2024-12-10

## 2024-12-10 RX ORDER — SENNOSIDES 8.6 MG
2 TABLET ORAL
Qty: 0 | Refills: 0 | DISCHARGE
Start: 2024-12-10

## 2024-12-10 RX ORDER — ACETAMINOPHEN, DIPHENHYDRAMINE HCL, PHENYLEPHRINE HCL 325; 25; 5 MG/1; MG/1; MG/1
5 TABLET ORAL ONCE
Refills: 0 | Status: COMPLETED | OUTPATIENT
Start: 2024-12-10 | End: 2024-12-10

## 2024-12-10 RX ORDER — MYCOPHENOLATE MOFETIL 500 MG/1
500 TABLET ORAL
Qty: 0 | Refills: 0 | DISCHARGE
Start: 2024-12-10

## 2024-12-10 RX ORDER — TAMSULOSIN HYDROCHLORIDE 0.4 MG/1
1 CAPSULE ORAL
Qty: 0 | Refills: 0 | DISCHARGE
Start: 2024-12-10

## 2024-12-10 RX ORDER — AMLODIPINE BESYLATE 10 MG/1
10 TABLET ORAL DAILY
Refills: 0 | Status: DISCONTINUED | OUTPATIENT
Start: 2024-12-10 | End: 2024-12-10

## 2024-12-10 RX ORDER — POTASSIUM CHLORIDE 600 MG/1
20 TABLET, EXTENDED RELEASE ORAL ONCE
Refills: 0 | Status: COMPLETED | OUTPATIENT
Start: 2024-12-10 | End: 2024-12-10

## 2024-12-10 RX ORDER — ROSUVASTATIN CALCIUM 5 MG/1
1 TABLET, FILM COATED ORAL
Qty: 0 | Refills: 0 | DISCHARGE
Start: 2024-12-10

## 2024-12-10 RX ORDER — LABETALOL 100 MG/1
1 TABLET, FILM COATED ORAL
Qty: 0 | Refills: 0 | DISCHARGE
Start: 2024-12-10

## 2024-12-10 RX ORDER — FAMOTIDINE 20 MG/1
1 TABLET, FILM COATED ORAL
Qty: 0 | Refills: 0 | DISCHARGE
Start: 2024-12-10

## 2024-12-10 RX ADMIN — VALGANCICLOVIR HYDROCHLORIDE POWDER, 450 MILLIGRAM(S): 50 FOR SOLUTION ORAL at 13:25

## 2024-12-10 RX ADMIN — Medication 1: at 08:44

## 2024-12-10 RX ADMIN — TACROLIMUS 1 MILLIGRAM(S): 5 CAPSULE ORAL at 13:26

## 2024-12-10 RX ADMIN — ACETAMINOPHEN, DIPHENHYDRAMINE HCL, PHENYLEPHRINE HCL 5 MILLIGRAM(S): 325; 25; 5 TABLET ORAL at 00:53

## 2024-12-10 RX ADMIN — AMLODIPINE BESYLATE 5 MILLIGRAM(S): 10 TABLET ORAL at 05:25

## 2024-12-10 RX ADMIN — ALLOPURINOL 300 MILLIGRAM(S): 300 TABLET ORAL at 13:26

## 2024-12-10 RX ADMIN — NYSTATIN 500000 UNIT(S): 500000 TABLET, FILM COATED ORAL at 13:26

## 2024-12-10 RX ADMIN — NYSTATIN 500000 UNIT(S): 500000 TABLET, FILM COATED ORAL at 05:25

## 2024-12-10 RX ADMIN — PREDNISONE 10 MILLIGRAM(S): 20 TABLET ORAL at 05:31

## 2024-12-10 RX ADMIN — FAMOTIDINE 20 MILLIGRAM(S): 20 TABLET, FILM COATED ORAL at 13:27

## 2024-12-10 RX ADMIN — POTASSIUM CHLORIDE 20 MILLIEQUIVALENT(S): 600 TABLET, EXTENDED RELEASE ORAL at 13:26

## 2024-12-10 RX ADMIN — Medication 1 TABLET(S): at 13:26

## 2024-12-10 RX ADMIN — TACROLIMUS 2 MILLIGRAM(S): 5 CAPSULE ORAL at 08:42

## 2024-12-10 RX ADMIN — LABETALOL 100 MILLIGRAM(S): 100 TABLET, FILM COATED ORAL at 05:25

## 2024-12-10 RX ADMIN — MYCOPHENOLATE MOFETIL 500 MILLIGRAM(S): 500 TABLET ORAL at 08:42

## 2024-12-10 NOTE — PROGRESS NOTE ADULT - ASSESSMENT
79 male with PMHx HTN, left inguinal hernia repair, HLD, PKD for past ~12 years recently with increased Cr for past 6 months, makes normal amount of urine, not on HD. Patient is now s/p DDRT 1a/1v/1u+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    [] DDRT 1a/1v/1u+stent POD#5  - Cr downtrending, excellent UO   - Strict I's & O's, JPx2,  - flomax 0.4qhs   - Pain regimen, bowel regimen  - SCDs/IS  - Renal US: patent  - lidocaine patch for chronic back pain   - passed TOV 12/9     [] IMMUNO  - Thymoglobulin induction with SST  - Env per level, MMF 500BID     [] HTN   - amlodipine 10mg qd, labetalol 100mg BID      []+ donor syphilis serology   - PCN G 2.4 x1 on 12/6, 2nd dose in 1 week, 3rd dose in 2 weeks     [] abnormal tele  - aflutter on tele 12/8, w/ 1st degree av block  - cards c/s: cont current regimen     []dispo   - discharge today

## 2024-12-10 NOTE — PROGRESS NOTE ADULT - ASSESSMENT
1-year-old male with PMHx HTN, left inguinal hernia repair, HLD, PCKD for past ~12 years recently with LISA for past 6 months not on HD presenting for DDRT s/p DDRT 12/5/24.  Transplant nephrology consulted for management and immunosuppression post transplant.    1. s/p DDRT 12/5/24 (1a/1v/1u +stent)  - Induction agent: Thymoglobulin  - SCr on admission prior to DDRT 4.99. SCr today is improved at 1.81  - Urinalysis is clear with 100 protein.   - US transplanted kidney (15/5/24) No hydronephrosis or perinephric collections. Patent renal transplant artery vasculature.  - Monitor labs and I/Os. Avoid nephrotoxins including, ACE/ARB, NSAIDs, contrast, etc. Dose medications as per eGFR.  - Passed TOV.   - Discharge today, follow up outpatient in 1 week.    2. Immunosuppression  - Increase envarsus to 3mg  - FK level 4.3 today. Check tacrolimus level 30mins prior to AM dose daily.   - prednisone 10mg and MMF 500mg BID. Taper prednisone to 5mg tomorrow 12/11.   - PPX: nystatin, bactrim, valcyte    3. HTN - increase Norvasc to 10mg and continue home labetalol 100mg BID.     If you have any questions, please feel free to contact me:  Jneni Scanlon MD PGY-4  Nephrology Fellow  Pager [phone] / [employer] Teams (Preferred)  (After 5pm or on weekends please page the on-call fellow)

## 2024-12-10 NOTE — PROGRESS NOTE ADULT - ATTENDING COMMENTS
Pt POD 4   Mild ATN of transplant but improving.   Plan for agarwal removal today.  Cont current immunosuppression and steroid taper.
ATN of transplant  Plan for d/c home today.  Cont current immunosuppression.

## 2024-12-10 NOTE — PROGRESS NOTE ADULT - PROVIDER SPECIALTY LIST ADULT
Transplant Nephrology
Transplant Surgery
Pharmacy
Transplant Nephrology
Transplant Nephrology
Transplant Surgery
Transplant Nephrology
Transplant Surgery

## 2024-12-10 NOTE — DISCHARGE NOTE NURSING/CASE MANAGEMENT/SOCIAL WORK - FINANCIAL ASSISTANCE
[facility] provides services at a reduced cost to those who are determined to be eligible through [facility]’s financial assistance program. Information regarding [facility]’s financial assistance program can be found by going to [email or web address] or by calling [phone].

## 2024-12-10 NOTE — DISCHARGE NOTE NURSING/CASE MANAGEMENT/SOCIAL WORK - PATIENT PORTAL LINK FT
You can access the FollowMyHealth Patient Portal offered by [facility] by registering at the following website: [email or web address]. By joining [employer]’s FollowMyHealth portal, you will also be able to view your health information using other applications (apps) compatible with our system.

## 2024-12-10 NOTE — DISCHARGE NOTE NURSING/CASE MANAGEMENT/SOCIAL WORK - NSDCFUADDAPPT_GEN_ALL_CORE_FT
FOLLOW-UP:  1. Repeat labs on Thursday 12/12 and follow up in transplant clinic next week.   2. Follow up with Surgeon in 4-6 weeks for removal of ureteral stent.

## 2024-12-10 NOTE — PROGRESS NOTE ADULT - REASON FOR ADMISSION
DDRT
Possible kidney transplant

## 2024-12-10 NOTE — PROGRESS NOTE ADULT - SUBJECTIVE AND OBJECTIVE BOX
[facility] DIVISION OF KIDNEY DISEASES AND HYPERTENSION -- FOLLOW UP NOTE  --------------------------------------------------------------------------------  Chief Complaint:    24 hour events/subjective:  Patient was seen and examined at bedside      PAST HISTORY  --------------------------------------------------------------------------------  No significant changes to PMH, PSH, FHx, SHx, unless otherwise noted    ALLERGIES & MEDICATIONS  --------------------------------------------------------------------------------  Allergies    No Known Allergies    Intolerances      Standing Inpatient Medications  allopurinol 300 milliGRAM(s) Oral daily  amLODIPine   Tablet 5 milliGRAM(s) Oral daily  antithymocyte globulin rabbit (peripheral) IVPB w/additives 75 milliGRAM(s) IV Intermittent once  chlorhexidine 2% Cloths 1 Application(s) Topical daily  dextrose 5%. 1000 milliLiter(s) IV Continuous <Continuous>  dextrose 5%. 1000 milliLiter(s) IV Continuous <Continuous>  dextrose 50% Injectable 25 Gram(s) IV Push once  dextrose 50% Injectable 12.5 Gram(s) IV Push once  dextrose 50% Injectable 25 Gram(s) IV Push once  dextrose Oral Gel 15 Gram(s) Oral once  famotidine    Tablet 20 milliGRAM(s) Oral daily  glucagon  Injectable 1 milliGRAM(s) IntraMuscular once  influenza  Vaccine (HIGH DOSE) 0.5 milliLiter(s) IntraMuscular once  insulin lispro (ADMELOG) corrective regimen sliding scale   SubCutaneous three times a day before meals  insulin lispro (ADMELOG) corrective regimen sliding scale   SubCutaneous at bedtime  mycophenolate mofetil 1 Gram(s) Oral <User Schedule>  nystatin    Suspension 524036 Unit(s) Swish and Swallow four times a day  polyethylene glycol 3350 17 Gram(s) Oral daily  rosuvastatin 20 milliGRAM(s) Oral at bedtime  senna 2 Tablet(s) Oral at bedtime  tacrolimus ER Tablet (ENVARSUS XR) 2 milliGRAM(s) Oral <User Schedule>  trimethoprim   80 mG/sulfamethoxazole 400 mG 1 Tablet(s) Oral daily  valGANciclovir 450 milliGRAM(s) Oral daily    PRN Inpatient Medications  ondansetron Injectable 4 milliGRAM(s) IV Push every 6 hours PRN  traMADol 25 milliGRAM(s) Oral every 6 hours PRN  traMADol 50 milliGRAM(s) Oral every 8 hours PRN      REVIEW OF SYSTEMS  --------------------------------------------------------------------------------  Gen: No fatigue, fevers/chills, weakness  Skin: No rashes  Head/Eyes/Ears/Mouth: No headache;No sore throat  Respiratory: No dyspnea, cough,   CV: No chest pain, PND, orthopnea  GI: No abdominal pain, diarrhea, constipation, nausea, vomiting  Transplant: No pain  : No increased frequency, dysuria, hematuria, nocturia  MSK: No joint pain/swelling; no back pain; no edema  Neuro: No dizziness/lightheadedness, weakness, seizures, numbness, tingling  Psych: No significant nervousness, anxiety, stress, depression    All other systems were reviewed and are negative, except as noted.    VITALS/PHYSICAL EXAM  --------------------------------------------------------------------------------  T(C): 36.6 (12-07-24 @ 08:08), Max: 37.2 (12-06-24 @ 21:00)  HR: 87 (12-07-24 @ 08:08) (87 - 105)  BP: 138/72 (12-07-24 @ 08:08) (135/67 - 161/74)  RR: 18 (12-07-24 @ 08:08) (18 - 18)  SpO2: 97% (12-07-24 @ 08:08) (95% - 99%)  Wt(kg): --  Height (cm): 165.1 (12-05-24 @ 20:04)  Weight (kg): 60.9 (12-05-24 @ 20:04)  BMI (kg/m2): 22.3 (12-05-24 @ 20:04)  BSA (m2): 1.67 (12-05-24 @ 20:04)      12-06-24 @ 07:01  -  12-07-24 @ 07:00  --------------------------------------------------------  IN: 4531 mL / OUT: 4455 mL / NET: 76 mL      Physical Exam:  	Gen: NAD  	HEENT: PERRL, supple neck, clear oropharynx  	Pulm: CTA B/L  	CV: RRR, S1S2; no rub  	Back: No spinal or CVA tenderness; no sacral edema  	Abd: +BS, soft, nontender/nondistended                      Transplant: No tenderness, swelling  	: No suprapubic tenderness  	UE: Warm, FROM; no edema; no asterixis  	LE: Warm, FROM; no edema  	Neuro: No focal deficits  	Psych: Normal affect and mood  	Skin: Warm, without rashes      LABS/STUDIES  --------------------------------------------------------------------------------              8.6    19.81 >-----------<  182      [12-07-24 @ 07:09]              24.6     136  |  98  |  50  ----------------------------<  204      [12-07-24 @ 07:10]  3.5   |  21  |  2.65        Ca     8.7     [12-07-24 @ 07:10]      Mg     2.4     [12-07-24 @ 07:10]      Phos  5.0     [12-07-24 @ 07:10]    TPro  5.9  /  Alb  3.4  /  TBili  0.2  /  DBili  x   /  AST  24  /  ALT  13  /  AlkPhos  49  [12-07-24 @ 07:10]    PT/INR: PT 10.1 , INR 0.88       [12-07-24 @ 07:09]  PTT: 25.9       [12-07-24 @ 07:09]      Creatinine Trend:  SCr 2.65 [12-07 @ 07:10]  SCr 4.58 [12-06 @ 06:37]  SCr 4.82 [12-06 @ 01:30]  SCr 4.90 [12-05 @ 16:06]          Urinalysis - [12-07-24 @ 07:10]      Color  / Appearance  / SG  / pH       Gluc 204 / Ketone   / Bili  / Urobili        Blood  / Protein  / Leuk Est  / Nitrite       RBC  / WBC  / Hyaline  / Gran  / Sq Epi  / Non Sq Epi  / Bacteria         HBsAb 305.2      [12-05-24 @ 16:06]  HBsAg Nonreact      [12-05-24 @ 16:06]  HBcAb Reactive      [12-05-24 @ 16:06]  HCV 0.04, Nonreact      [12-05-24 @ 16:06]  HIV Nonreact      [12-05-24 @ 16:06]      
[facility] DIVISION OF KIDNEY DISEASES AND HYPERTENSION -- FOLLOW UP NOTE  --------------------------------------------------------------------------------  Chief Complaint:    24 hour events/subjective:  Patient was seen and examined at bedside      PAST HISTORY  --------------------------------------------------------------------------------  No significant changes to PMH, PSH, FHx, SHx, unless otherwise noted    ALLERGIES & MEDICATIONS  --------------------------------------------------------------------------------  Allergies    No Known Allergies    Intolerances      Standing Inpatient Medications  allopurinol 300 milliGRAM(s) Oral daily  amLODIPine   Tablet 5 milliGRAM(s) Oral daily  chlorhexidine 2% Cloths 1 Application(s) Topical daily  dextrose 5%. 1000 milliLiter(s) IV Continuous <Continuous>  dextrose 5%. 1000 milliLiter(s) IV Continuous <Continuous>  dextrose 50% Injectable 25 Gram(s) IV Push once  dextrose 50% Injectable 12.5 Gram(s) IV Push once  dextrose 50% Injectable 25 Gram(s) IV Push once  dextrose Oral Gel 15 Gram(s) Oral once  famotidine    Tablet 20 milliGRAM(s) Oral daily  glucagon  Injectable 1 milliGRAM(s) IntraMuscular once  influenza  Vaccine (HIGH DOSE) 0.5 milliLiter(s) IntraMuscular once  insulin lispro (ADMELOG) corrective regimen sliding scale   SubCutaneous three times a day before meals  insulin lispro (ADMELOG) corrective regimen sliding scale   SubCutaneous at bedtime  labetalol 100 milliGRAM(s) Oral two times a day  mycophenolate mofetil 500 milliGRAM(s) Oral <User Schedule>  nystatin    Suspension 408857 Unit(s) Swish and Swallow four times a day  polyethylene glycol 3350 17 Gram(s) Oral daily  rosuvastatin 20 milliGRAM(s) Oral at bedtime  senna 2 Tablet(s) Oral at bedtime  tacrolimus ER Tablet (ENVARSUS XR) 2 milliGRAM(s) Oral <User Schedule>  tamsulosin 0.4 milliGRAM(s) Oral at bedtime  trimethoprim   80 mG/sulfamethoxazole 400 mG 1 Tablet(s) Oral daily  valGANciclovir 450 milliGRAM(s) Oral daily    PRN Inpatient Medications  ondansetron Injectable 4 milliGRAM(s) IV Push every 6 hours PRN  traMADol 25 milliGRAM(s) Oral every 6 hours PRN  traMADol 50 milliGRAM(s) Oral every 8 hours PRN      REVIEW OF SYSTEMS  --------------------------------------------------------------------------------  Gen: No fatigue, fevers/chills, weakness  Skin: No rashes  Head/Eyes/Ears/Mouth: No headache;No sore throat  Respiratory: No dyspnea, cough,   CV: No chest pain, PND, orthopnea  GI: No abdominal pain, diarrhea, constipation, nausea, vomiting  Transplant: No pain  : No increased frequency, dysuria, hematuria, nocturia  MSK: No joint pain/swelling; no back pain; no edema  Neuro: No dizziness/lightheadedness, weakness, seizures, numbness, tingling  Psych: No significant nervousness, anxiety, stress, depression    All other systems were reviewed and are negative, except as noted.    VITALS/PHYSICAL EXAM  --------------------------------------------------------------------------------  T(C): 36.5 (12-08-24 @ 05:00), Max: 36.8 (12-08-24 @ 01:01)  HR: 87 (12-08-24 @ 05:00) (82 - 90)  BP: 141/72 (12-08-24 @ 05:00) (132/60 - 145/71)  RR: 18 (12-08-24 @ 05:00) (18 - 18)  SpO2: 98% (12-08-24 @ 05:00) (94% - 98%)  Wt(kg): --        12-07-24 @ 07:01  -  12-08-24 @ 07:00  --------------------------------------------------------  IN: 1936 mL / OUT: 3902.5 mL / NET: -1966.5 mL      Physical Exam:  	Gen: NAD  	HEENT: PERRL, supple neck, clear oropharynx  	Pulm: CTA B/L  	CV: RRR, S1S2; no rub  	Back: No spinal or CVA tenderness; no sacral edema  	Abd: +BS, soft, nontender/nondistended                      Transplant: No tenderness, swelling  	: No suprapubic tenderness  	UE: Warm, FROM; no edema; no asterixis  	LE: Warm, FROM; no edema  	Neuro: No focal deficits  	Psych: Normal affect and mood  	Skin: Warm, without rashes      LABS/STUDIES  --------------------------------------------------------------------------------              8.8    13.34 >-----------<  185      [12-08-24 @ 06:43]              26.1     135  |  98  |  49  ----------------------------<  200      [12-08-24 @ 06:43]  3.1   |  20  |  1.97        Ca     9.3     [12-08-24 @ 06:43]      Mg     2.3     [12-08-24 @ 06:43]      Phos  3.7     [12-08-24 @ 06:43]    TPro  6.7  /  Alb  3.7  /  TBili  0.2  /  DBili  x   /  AST  17  /  ALT  10  /  AlkPhos  54  [12-08-24 @ 06:43]    PT/INR: PT 9.0  , INR 0.78       [12-08-24 @ 06:43]  PTT: 24.6       [12-08-24 @ 06:43]      Creatinine Trend:  SCr 1.97 [12-08 @ 06:43]  SCr 2.65 [12-07 @ 07:10]  SCr 4.58 [12-06 @ 06:37]  SCr 4.82 [12-06 @ 01:30]  SCr 4.90 [12-05 @ 16:06]    Tacrolimus (), Serum: 5.0 ng/mL (12-07 @ 07:09)        Urinalysis - [12-08-24 @ 06:43]      Color  / Appearance  / SG  / pH       Gluc 200 / Ketone   / Bili  / Urobili        Blood  / Protein  / Leuk Est  / Nitrite       RBC  / WBC  / Hyaline  / Gran  / Sq Epi  / Non Sq Epi  / Bacteria         HBsAb 305.2      [12-05-24 @ 16:06]  HBsAg Nonreact      [12-05-24 @ 16:06]  HBcAb Reactive      [12-05-24 @ 16:06]  HCV 0.04, Nonreact      [12-05-24 @ 16:06]  HIV Nonreact      [12-05-24 @ 16:06]      
Transplant Surgery - Multidisciplinary Rounds  --------------------------------------------------------------   Tx Date: 12/5/2024        POD#3    HPI: 79 male with PMHx HTN, left inguinal hernia repair, HLD, PKD for past ~12 years recently with increased Cr for past 6 months, makes normal amount of urine, not on HD. Patient is now s/p DDRT 1a/1v/1u+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    Interval Events:  - afebrile, hypertensive, started labetolol 100 bid  - dec MMF to 500 BID   - tele On w/ aflutter briefly, ECG w/ known 1st degree AV block, no aflut     Immunosuppression  Induction: thymo  Maintenance: FK/MMF/SST  Ongoing monitoring for signs of rejection     Potential Discharge date: TBD  Education:  Medications  Plan of care:  See Below        MEDICATIONS  (STANDING):  allopurinol 300 milliGRAM(s) Oral daily  amLODIPine   Tablet 5 milliGRAM(s) Oral daily  chlorhexidine 2% Cloths 1 Application(s) Topical daily  dextrose 5%. 1000 milliLiter(s) (50 mL/Hr) IV Continuous <Continuous>  dextrose 5%. 1000 milliLiter(s) (100 mL/Hr) IV Continuous <Continuous>  dextrose 50% Injectable 12.5 Gram(s) IV Push once  dextrose 50% Injectable 25 Gram(s) IV Push once  dextrose 50% Injectable 25 Gram(s) IV Push once  dextrose Oral Gel 15 Gram(s) Oral once  famotidine    Tablet 20 milliGRAM(s) Oral daily  glucagon  Injectable 1 milliGRAM(s) IntraMuscular once  influenza  Vaccine (HIGH DOSE) 0.5 milliLiter(s) IntraMuscular once  insulin lispro (ADMELOG) corrective regimen sliding scale   SubCutaneous three times a day before meals  insulin lispro (ADMELOG) corrective regimen sliding scale   SubCutaneous at bedtime  labetalol 100 milliGRAM(s) Oral two times a day  mycophenolate mofetil 500 milliGRAM(s) Oral <User Schedule>  nystatin    Suspension 981815 Unit(s) Swish and Swallow four times a day  polyethylene glycol 3350 17 Gram(s) Oral daily  rosuvastatin 20 milliGRAM(s) Oral at bedtime  senna 2 Tablet(s) Oral at bedtime  tacrolimus ER Tablet (ENVARSUS XR) 2 milliGRAM(s) Oral <User Schedule>  tamsulosin 0.4 milliGRAM(s) Oral at bedtime  trimethoprim   80 mG/sulfamethoxazole 400 mG 1 Tablet(s) Oral daily  valGANciclovir 450 milliGRAM(s) Oral daily    MEDICATIONS  (PRN):  ondansetron Injectable 4 milliGRAM(s) IV Push every 6 hours PRN Nausea and/or Vomiting  traMADol 25 milliGRAM(s) Oral every 6 hours PRN Moderate Pain (4 - 6)  traMADol 50 milliGRAM(s) Oral every 8 hours PRN Severe Pain (7 - 10)      PAST MEDICAL & SURGICAL HISTORY:      Vital Signs Last 24 Hrs  T(C): 36.5 (08 Dec 2024 05:00), Max: 36.8 (08 Dec 2024 01:01)  T(F): 97.7 (08 Dec 2024 05:00), Max: 98.2 (08 Dec 2024 01:01)  HR: 87 (08 Dec 2024 05:00) (82 - 90)  BP: 141/72 (08 Dec 2024 05:00) (132/60 - 145/71)  BP(mean): 97 (08 Dec 2024 01:01) (88 - 102)  RR: 18 (08 Dec 2024 05:00) (18 - 18)  SpO2: 98% (08 Dec 2024 05:00) (94% - 98%)    Parameters below as of 08 Dec 2024 05:00  Patient On (Oxygen Delivery Method): room air        I&O's Summary    07 Dec 2024 07:01  -  08 Dec 2024 07:00  --------------------------------------------------------  IN: 1936 mL / OUT: 3902.5 mL / NET: -1966.5 mL                              8.8    13.34 )-----------( 185      ( 08 Dec 2024 06:43 )             26.1     12-08    135  |  98  |  49[H]  ----------------------------<  200[H]  3.1[L]   |  20[L]  |  1.97[H]    Ca    9.3      08 Dec 2024 06:43  Phos  3.7     12-08  Mg     2.3     12-08    TPro  6.7  /  Alb  3.7  /  TBili  0.2  /  DBili  x   /  AST  17  /  ALT  10  /  AlkPhos  54  12-08    Tacrolimus (), Serum: 5.0 ng/mL (12-07 @ 07:09)                    Review of systems  Gen: No weight changes, fatigue, fevers/chills, weakness  Skin: No rashes  Head/Eyes/Ears/Mouth: No headache; Normal hearing; Normal vision w/o blurriness; No sinus pain/discomfort, sore throat  Respiratory: No dyspnea, cough, wheezing, hemoptysis  CV: No chest pain, PND, orthopnea  GI: Minimal abdominal pain at surgical incision site; denies diarrhea, constipation, nausea, vomiting, melena, hematochezia  : No increased frequency, dysuria, hematuria, nocturia  MSK: No joint pain/swelling; no back pain; no edema  Neuro: No dizziness/lightheadedness, weakness, seizures, numbness, tingling  Heme: No easy bruising or bleeding  Endo: No heat/cold intolerance  Psych: No significant nervousness, anxiety, stress, depression  All other systems were reviewed and are negative, except as noted.      PHYSICAL EXAM:  Constitutional: Well developed / well nourished  Eyes: Anicteric, PERRLA  ENMT: nc/at  Neck: supple  Respiratory: CTA B/L  Cardiovascular: RRR  Gastrointestinal: Soft, non distended, mild tenderness at the incision site; RLQ incision with honeycomb dressing, JPx2 ss,    Genitourinary: Urinary catheter in place  Extremities: SCD's in place and working bilaterally  Vascular: Palpable dp pulses bilaterally  Neurological: A&O x3  Skin: no rashes, ulcerations or lesions;  Musculoskeletal: Moving all extremities  Psychiatric: Responsive    
[facility] DIVISION OF KIDNEY DISEASES AND HYPERTENSION -- FOLLOW UP NOTE  --------------------------------------------------------------------------------  Chief Complaint: s/p DDRT 12/5/24 (1a/1v/1u +stent) on immmunosuppresion    24 hour events/subjective: Pt. seen and examined at bedside earlier today. Feels well, tolerating po, ambulating. Had some pain with dressing removal yesterday that subsided in 1 min. No fever, CP, SOB, LE edema, HA or dizziness during rounds today.         PAST HISTORY  --------------------------------------------------------------------------------  No significant changes to PMH, PSH, FHx, SHx, unless otherwise noted    ALLERGIES & MEDICATIONS  --------------------------------------------------------------------------------  Allergies    No Known Allergies    Intolerances      Standing Inpatient Medications  allopurinol 300 milliGRAM(s) Oral daily  amLODIPine   Tablet 5 milliGRAM(s) Oral daily  chlorhexidine 2% Cloths 1 Application(s) Topical daily  dextrose 5%. 1000 milliLiter(s) IV Continuous <Continuous>  dextrose 5%. 1000 milliLiter(s) IV Continuous <Continuous>  dextrose 50% Injectable 25 Gram(s) IV Push once  dextrose 50% Injectable 12.5 Gram(s) IV Push once  dextrose 50% Injectable 25 Gram(s) IV Push once  dextrose Oral Gel 15 Gram(s) Oral once  famotidine    Tablet 20 milliGRAM(s) Oral daily  glucagon  Injectable 1 milliGRAM(s) IntraMuscular once  influenza  Vaccine (HIGH DOSE) 0.5 milliLiter(s) IntraMuscular once  insulin lispro (ADMELOG) corrective regimen sliding scale   SubCutaneous three times a day before meals  insulin lispro (ADMELOG) corrective regimen sliding scale   SubCutaneous at bedtime  labetalol 100 milliGRAM(s) Oral two times a day  melatonin 5 milliGRAM(s) Oral at bedtime  mycophenolate mofetil 500 milliGRAM(s) Oral <User Schedule>  nystatin    Suspension 043541 Unit(s) Swish and Swallow four times a day  polyethylene glycol 3350 17 Gram(s) Oral daily  rosuvastatin 20 milliGRAM(s) Oral at bedtime  senna 2 Tablet(s) Oral at bedtime  tacrolimus ER Tablet (ENVARSUS XR) 2 milliGRAM(s) Oral <User Schedule>  tamsulosin 0.4 milliGRAM(s) Oral at bedtime  trimethoprim   80 mG/sulfamethoxazole 400 mG 1 Tablet(s) Oral daily  valGANciclovir 450 milliGRAM(s) Oral daily    PRN Inpatient Medications  ondansetron Injectable 4 milliGRAM(s) IV Push every 6 hours PRN  traMADol 25 milliGRAM(s) Oral every 6 hours PRN  traMADol 50 milliGRAM(s) Oral every 8 hours PRN      REVIEW OF SYSTEMS  --------------------------------------------------------------------------------    All other systems were reviewed and are negative, except as noted.    VITALS/PHYSICAL EXAM  --------------------------------------------------------------------------------  T(C): 36.7 (12-09-24 @ 13:14), Max: 36.7 (12-08-24 @ 20:41)  HR: 91 (12-09-24 @ 13:14) (73 - 107)  BP: 140/65 (12-09-24 @ 13:14) (137/67 - 156/73)  RR: 18 (12-09-24 @ 13:14) (18 - 18)  SpO2: 99% (12-09-24 @ 13:14) (98% - 99%)  Wt(kg): --  Height (cm): 165.1 (12-09-24 @ 05:19)  Weight (kg): 63.1 (12-09-24 @ 05:19)  BMI (kg/m2): 23.1 (12-09-24 @ 05:19)  BSA (m2): 1.7 (12-09-24 @ 05:19)      12-08-24 @ 07:01  -  12-09-24 @ 07:00  --------------------------------------------------------  IN: 600 mL / OUT: 2920 mL / NET: -2320 mL    12-09-24 @ 07:01  -  12-09-24 @ 16:03  --------------------------------------------------------  IN: 450 mL / OUT: 1025 mL / NET: -575 mL      Physical Exam:  Gen: NAD, able to speak in full sentences   HEENT: PERRL, MMM   Pulm: CTA B/L, no crackles   CV: RRR, S1S2+  Abd: +BS, soft  Transplant: +drains. incision site C/D/I.   : +agarwal catheter  MSK: no edema   Psych: Normal affect and mood      LABS/STUDIES  --------------------------------------------------------------------------------              9.0    8.78  >-----------<  156      [12-09-24 @ 06:37]              25.9     136  |  103  |  44  ----------------------------<  173      [12-09-24 @ 06:43]  3.6   |  18  |  1.81        Ca     8.7     [12-09-24 @ 06:43]      Mg     2.2     [12-09-24 @ 06:43]      Phos  3.4     [12-09-24 @ 06:43]    TPro  5.5  /  Alb  3.4  /  TBili  0.2  /  DBili  x   /  AST  12  /  ALT  11  /  AlkPhos  47  [12-09-24 @ 06:43]    PT/INR: PT 9.0  , INR 0.78       [12-08-24 @ 06:43]  PTT: 23.9       [12-09-24 @ 06:43]      Creatinine Trend:  SCr 1.81 [12-09 @ 06:43]  SCr 1.90 [12-08 @ 14:17]  SCr 1.97 [12-08 @ 06:43]  SCr 2.65 [12-07 @ 07:10]  SCr 4.58 [12-06 @ 06:37]    Tacrolimus (), Serum: 5.3 ng/mL (12-09 @ 06:39)  Tacrolimus (), Serum: 8.5 ng/mL (12-08 @ 06:43)  Tacrolimus (), Serum: 5.0 ng/mL (12-07 @ 07:09)                HBsAb 305.2      [12-05-24 @ 16:06]  HBsAg Nonreact      [12-05-24 @ 16:06]  HBcAb Reactive      [12-05-24 @ 16:06]  HCV 0.04, Nonreact      [12-05-24 @ 16:06]  HIV Nonreact      [12-05-24 @ 16:06]      
Transplant Surgery - Multidisciplinary Rounds  --------------------------------------------------------------   Tx Date: 12/5/2024        POD#1    HPI: 79 male with PMHx HTN, left inguinal hernia repair, HLD, PKD for past ~12 years recently with increased Cr for past 6 months, makes normal amount of urine, not on HD. Patient is now s/p DDRT 1a/1v/1u+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    Interval Events:  -L. kidney to R. side  -  mL, 3L fluids intraop  - agarwal, SARITHA drains x2  - Received stable in PACU, c/o some dry mouth    Immunosupression:  Induction: thymo  Maintenance: FK/MMF/SST  Ongoing monitoring for signs of rejection     Potential Discharge date: TBD  Education:  Medications  Plan of care:  See Below    MEDICATIONS  (STANDING):  antithymocyte globulin rabbit (peripheral) IVPB w/additives 75 milliGRAM(s) IV Intermittent once  chlorhexidine 2% Cloths 1 Application(s) Topical daily  dextrose 5%. 1000 milliLiter(s) (50 mL/Hr) IV Continuous <Continuous>  dextrose 5%. 1000 milliLiter(s) (100 mL/Hr) IV Continuous <Continuous>  dextrose 50% Injectable 25 Gram(s) IV Push once  dextrose 50% Injectable 12.5 Gram(s) IV Push once  dextrose 50% Injectable 25 Gram(s) IV Push once  dextrose Oral Gel 15 Gram(s) Oral once  glucagon  Injectable 1 milliGRAM(s) IntraMuscular once  influenza  Vaccine (HIGH DOSE) 0.5 milliLiter(s) IntraMuscular once  insulin lispro (ADMELOG) corrective regimen sliding scale   SubCutaneous every 6 hours  multiple electrolytes Injection Type 1 1000 milliLiter(s) (70 mL/Hr) IV Continuous <Continuous>  multiple electrolytes Injection Type 1 1000 milliLiter(s) (50 mL/Hr) IV Continuous <Continuous>  polyethylene glycol 3350 17 Gram(s) Oral daily  senna 2 Tablet(s) Oral at bedtime    MEDICATIONS  (PRN):  fentaNYL    Injectable 25 MICROGram(s) IV Push every 5 minutes PRN Moderate Pain (4 - 6)  fentaNYL    Injectable 50 MICROGram(s) IV Push every 15 minutes PRN Severe Pain (7 - 10)  ondansetron Injectable 4 milliGRAM(s) IV Push every 6 hours PRN Nausea and/or Vomiting  ondansetron Injectable 4 milliGRAM(s) IV Push once PRN Nausea and/or Vomiting  traMADol 25 milliGRAM(s) Oral every 6 hours PRN Moderate Pain (4 - 6)  traMADol 50 milliGRAM(s) Oral every 8 hours PRN Severe Pain (7 - 10)      PAST MEDICAL & SURGICAL HISTORY:      Vital Signs Last 24 Hrs  T(C): 36.4 (06 Dec 2024 02:30), Max: 36.6 (06 Dec 2024 00:40)  T(F): 97.5 (06 Dec 2024 02:30), Max: 97.9 (06 Dec 2024 00:40)  HR: 94 (06 Dec 2024 04:00) (86 - 96)  BP: 122/57 (06 Dec 2024 00:40) (122/57 - 152/71)  BP(mean): 81 (06 Dec 2024 00:40) (81 - 81)  RR: 15 (06 Dec 2024 04:00) (14 - 18)  SpO2: 97% (06 Dec 2024 04:00) (96% - 100%)    Parameters below as of 06 Dec 2024 04:00  Patient On (Oxygen Delivery Method): nasal cannula  O2 Flow (L/min): 2      I&O's Summary    05 Dec 2024 07:01  -  06 Dec 2024 05:11  --------------------------------------------------------  IN: 765 mL / OUT: 600 mL / NET: 165 mL                              7.7    21.77 )-----------( 196      ( 06 Dec 2024 04:36 )             23.4     12-06    140  |  97  |  62[H]  ----------------------------<  178[H]  3.9   |  18[L]  |  4.82[H]    Ca    8.3[L]      06 Dec 2024 01:30  Phos  5.2     12-06  Mg     2.1     12-06    TPro  5.9[L]  /  Alb  3.6  /  TBili  0.2  /  DBili  x   /  AST  34  /  ALT  14  /  AlkPhos  54  12-06            Review of systems  Gen: No weight changes, fatigue, fevers/chills, weakness  Skin: No rashes  Head/Eyes/Ears/Mouth: No headache; Normal hearing; Normal vision w/o blurriness; No sinus pain/discomfort, sore throat  Respiratory: No dyspnea, cough, wheezing, hemoptysis  CV: No chest pain, PND, orthopnea  GI: Minimal abdominal pain at surgical incision site; denies diarrhea, constipation, nausea, vomiting, melena, hematochezia  : No increased frequency, dysuria, hematuria, nocturia  MSK: No joint pain/swelling; no back pain; no edema  Neuro: No dizziness/lightheadedness, weakness, seizures, numbness, tingling  Heme: No easy bruising or bleeding  Endo: No heat/cold intolerance  Psych: No significant nervousness, anxiety, stress, depression  All other systems were reviewed and are negative, except as noted.      PHYSICAL EXAM:  Constitutional: Well developed / well nourished  Eyes: Anicteric, PERRLA  ENMT: nc/at  Neck: supple  Respiratory: CTA B/L  Cardiovascular: RRR  Gastrointestinal: Soft, non distended, mild tenderness at the incision site; incision c/d/i; JPx2 - #1 no output, #2 sang>ss  Genitourinary: Urinary catheter in place  Extremities: SCD's in place and working bilaterally  Vascular: Palpable dp pulses bilaterally  Neurological: A&O x3  Skin: no rashes, ulcerations or lesions;  Musculoskeletal: Moving all extremities  Psychiatric: Responsive    
Transplant Surgery - Multidisciplinary Rounds  --------------------------------------------------------------   Tx Date: 12/5/2024        POD#4    HPI: 79 male with PMHx HTN, left inguinal hernia repair, HLD, PKD for past ~12 years recently with increased Cr for past 6 months, makes normal amount of urine, not on HD. Patient is now s/p DDRT 1a/1v/1u+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    Interval Events:  - afebrile, vss  - hypokalemia, repleated twice   - Cr downtrending    Immunosuppression  Induction: thymo  Maintenance: FK/MMF/SST  Ongoing monitoring for signs of rejection     Potential Discharge date: TBD  Education:  Medications  Plan of care:  See Below        MEDICATIONS  (STANDING):  allopurinol 300 milliGRAM(s) Oral daily  amLODIPine   Tablet 5 milliGRAM(s) Oral daily  chlorhexidine 2% Cloths 1 Application(s) Topical daily  dextrose 5%. 1000 milliLiter(s) (50 mL/Hr) IV Continuous <Continuous>  dextrose 5%. 1000 milliLiter(s) (100 mL/Hr) IV Continuous <Continuous>  dextrose 50% Injectable 25 Gram(s) IV Push once  dextrose 50% Injectable 12.5 Gram(s) IV Push once  dextrose 50% Injectable 25 Gram(s) IV Push once  dextrose Oral Gel 15 Gram(s) Oral once  famotidine    Tablet 20 milliGRAM(s) Oral daily  glucagon  Injectable 1 milliGRAM(s) IntraMuscular once  influenza  Vaccine (HIGH DOSE) 0.5 milliLiter(s) IntraMuscular once  insulin lispro (ADMELOG) corrective regimen sliding scale   SubCutaneous three times a day before meals  insulin lispro (ADMELOG) corrective regimen sliding scale   SubCutaneous at bedtime  labetalol 100 milliGRAM(s) Oral two times a day  melatonin 5 milliGRAM(s) Oral at bedtime  mycophenolate mofetil 500 milliGRAM(s) Oral <User Schedule>  nystatin    Suspension 730635 Unit(s) Swish and Swallow four times a day  polyethylene glycol 3350 17 Gram(s) Oral daily  rosuvastatin 20 milliGRAM(s) Oral at bedtime  senna 2 Tablet(s) Oral at bedtime  tacrolimus ER Tablet (ENVARSUS XR) 2 milliGRAM(s) Oral <User Schedule>  tamsulosin 0.4 milliGRAM(s) Oral at bedtime  trimethoprim   80 mG/sulfamethoxazole 400 mG 1 Tablet(s) Oral daily  valGANciclovir 450 milliGRAM(s) Oral daily    MEDICATIONS  (PRN):  ondansetron Injectable 4 milliGRAM(s) IV Push every 6 hours PRN Nausea and/or Vomiting  traMADol 25 milliGRAM(s) Oral every 6 hours PRN Moderate Pain (4 - 6)  traMADol 50 milliGRAM(s) Oral every 8 hours PRN Severe Pain (7 - 10)      PAST MEDICAL & SURGICAL HISTORY:      Vital Signs Last 24 Hrs  T(C): 36.5 (09 Dec 2024 09:00), Max: 36.7 (08 Dec 2024 20:41)  T(F): 97.7 (09 Dec 2024 09:00), Max: 98.1 (08 Dec 2024 20:41)  HR: 85 (09 Dec 2024 09:00) (73 - 107)  BP: 156/73 (09 Dec 2024 09:00) (137/67 - 156/73)  BP(mean): 105 (08 Dec 2024 22:55) (96 - 105)  RR: 18 (09 Dec 2024 09:00) (18 - 18)  SpO2: 99% (09 Dec 2024 09:00) (97% - 99%)    Parameters below as of 09 Dec 2024 09:00  Patient On (Oxygen Delivery Method): room air        I&O's Summary    08 Dec 2024 07:01  -  09 Dec 2024 07:00  --------------------------------------------------------  IN: 600 mL / OUT: 2920 mL / NET: -2320 mL    09 Dec 2024 07:01  -  09 Dec 2024 10:59  --------------------------------------------------------  IN: 250 mL / OUT: 600 mL / NET: -350 mL                              9.0    8.78  )-----------( 156      ( 09 Dec 2024 06:37 )             25.9     12-09    136  |  103  |  44[H]  ----------------------------<  173[H]  3.6   |  18[L]  |  1.81[H]    Ca    8.7      09 Dec 2024 06:43  Phos  3.4     12-09  Mg     2.2     12-09    TPro  5.5[L]  /  Alb  3.4  /  TBili  0.2  /  DBili  x   /  AST  12  /  ALT  11  /  AlkPhos  47  12-09    Tacrolimus (), Serum: 5.3 ng/mL (12-09 @ 06:39)                    Review of systems  Gen: No weight changes, fatigue, fevers/chills, weakness  Skin: No rashes  Head/Eyes/Ears/Mouth: No headache; Normal hearing; Normal vision w/o blurriness; No sinus pain/discomfort, sore throat  Respiratory: No dyspnea, cough, wheezing, hemoptysis  CV: No chest pain, PND, orthopnea  GI: Minimal abdominal pain at surgical incision site; denies diarrhea, constipation, nausea, vomiting, melena, hematochezia  : No increased frequency, dysuria, hematuria, nocturia  MSK: No joint pain/swelling; no back pain; no edema  Neuro: No dizziness/lightheadedness, weakness, seizures, numbness, tingling  Heme: No easy bruising or bleeding  Endo: No heat/cold intolerance  Psych: No significant nervousness, anxiety, stress, depression  All other systems were reviewed and are negative, except as noted.      PHYSICAL EXAM:  Constitutional: Well developed / well nourished  Eyes: Anicteric, PERRLA  ENMT: nc/at  Neck: supple  Respiratory: CTA B/L  Cardiovascular: RRR  Gastrointestinal: Soft, non distended, mild tenderness at the incision site; RLQ incision with honeycomb dressing, JPx2 ss,    Genitourinary: Urinary catheter in place  Extremities: SCD's in place and working bilaterally  Vascular: Palpable dp pulses bilaterally  Neurological: A&O x3  Skin: no rashes, ulcerations or lesions;  Musculoskeletal: Moving all extremities  Psychiatric: Responsive    
 FLORY CARCAMO is a 79y Male s/p DDRT on 12/6/24. PMH is significant for HTN, left inguinal hernia repair, HLD, PKD for past ~12 years.  ESRD not on HD    NKDA    CMV+/+  EBV+/+    Transplant Medications  Induction  -Thymoglobulin (cumulative dose of ~3 mg/kg)        Premedicate one hour prior to administration with                -Acetaminophen 650 mg PO/MD                -Diphenhydramine 50 mg IV/PO                -Steroid taper        Adjust dose for WBC < 3 or PLT < 75  -Methylprednisolone taper (switch to PO prednisone on POD 4)            POD 0: 500 mg IV in OR            POD 1: 125 mg IV Q12H            POD 2: 60 mg IV Q12H            POD 3: 30 mg IV Q12H        Maintenance Immunosuppression  -Tacrolimus (Envarsus) 0.14 mg/kg daily (Adjust for goal trough: 8-10)  -Mycophenolate 1,000 mg PO Q12H  -Prednisone             POD 4: 20 mg PO Q12H            POD 5: 10 mg PO Q12H            POD 6: 5 mg PO Q12H            POD 7: 5 mg daily     Anti-infection   -Bactrim SS tablet (frequency based on renal function)  -Valganciclovir (dose based on CMV serostatus and frequency based on renal function)  -Nystatin swish and swallow 5 mL four times daily    Surgical prophylaxis pre- and intra-operative dosing  -Cefazolin    Prophylaxis  -GI ppx: famotidine 20 mg daily  -Bowel ppx: senna  -DVT: sequential compression device  -Pain:            Mild: Acetaminophen 650 mg every 6 hours PRN           Moderate: Tramadol 25 mg every 4 hours PRN (adjust for renal function)           Severe: Tramadol 50 mg every 4 hours PRN (adjust for renal function)    Home Medications:  allopurinol 300 mg oral tablet: 1 tab(s) orally once a day (26 Oct 2024 07:40)  amLODIPine 10 mg oral tablet: 1 tab(s) orally once a day (26 Oct 2024 07:40)  furosemide 40 mg oral tablet: 1 tab(s) orally once a day (26 Oct 2024 07:40)  labetalol 100 mg oral tablet: 1 tab(s) orally 3 times a day (26 Oct 2024 07:40)  multivitamin: 1 each once a day (26 Oct 2024 07:40)  rosuvastatin 20 mg oral tablet: 1 tab(s) orally once a day (26 Oct 2024 07:40)      Outpatient medication reconciliation reviewed and will be re-started appropriately.  Plan discussed with multidisciplinary team.   
Transplant Surgery - Multidisciplinary Rounds  --------------------------------------------------------------   Tx Date: 12/5/2024        POD#5    HPI: 79 male with PMHx HTN, left inguinal hernia repair, HLD, PKD for past ~12 years recently with increased Cr for past 6 months, makes normal amount of urine, not on HD. Patient is now s/p DDRT 1a/1v/1u+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    Interval Events:  - any davis'ed, passed TOV   - good graft function     Immunosuppression  Induction: thymo  Maintenance: FK/MMF/SST  Ongoing monitoring for signs of rejection     Potential Discharge date: TBD  Education:  Medications  Plan of care:  See Below        MEDICATIONS  (STANDING):  allopurinol 300 milliGRAM(s) Oral daily  amLODIPine   Tablet 10 milliGRAM(s) Oral daily  chlorhexidine 2% Cloths 1 Application(s) Topical daily  dextrose 5%. 1000 milliLiter(s) (50 mL/Hr) IV Continuous <Continuous>  dextrose 5%. 1000 milliLiter(s) (100 mL/Hr) IV Continuous <Continuous>  dextrose 50% Injectable 25 Gram(s) IV Push once  dextrose 50% Injectable 12.5 Gram(s) IV Push once  dextrose 50% Injectable 25 Gram(s) IV Push once  dextrose Oral Gel 15 Gram(s) Oral once  famotidine    Tablet 20 milliGRAM(s) Oral daily  glucagon  Injectable 1 milliGRAM(s) IntraMuscular once  influenza  Vaccine (HIGH DOSE) 0.5 milliLiter(s) IntraMuscular once  insulin lispro (ADMELOG) corrective regimen sliding scale   SubCutaneous three times a day before meals  insulin lispro (ADMELOG) corrective regimen sliding scale   SubCutaneous at bedtime  labetalol 100 milliGRAM(s) Oral two times a day  melatonin 5 milliGRAM(s) Oral at bedtime  mycophenolate mofetil 500 milliGRAM(s) Oral <User Schedule>  nystatin    Suspension 365587 Unit(s) Swish and Swallow four times a day  polyethylene glycol 3350 17 Gram(s) Oral daily  rosuvastatin 20 milliGRAM(s) Oral at bedtime  senna 2 Tablet(s) Oral at bedtime  tacrolimus ER Tablet (ENVARSUS XR) 2 milliGRAM(s) Oral <User Schedule>  tamsulosin 0.4 milliGRAM(s) Oral at bedtime  trimethoprim   80 mG/sulfamethoxazole 400 mG 1 Tablet(s) Oral daily  valGANciclovir 450 milliGRAM(s) Oral daily    MEDICATIONS  (PRN):  ondansetron Injectable 4 milliGRAM(s) IV Push every 6 hours PRN Nausea and/or Vomiting  traMADol 25 milliGRAM(s) Oral every 6 hours PRN Moderate Pain (4 - 6)  traMADol 50 milliGRAM(s) Oral every 8 hours PRN Severe Pain (7 - 10)      PAST MEDICAL & SURGICAL HISTORY:      Vital Signs Last 24 Hrs  T(C): 37 (10 Dec 2024 09:00), Max: 37 (10 Dec 2024 09:00)  T(F): 98.6 (10 Dec 2024 09:00), Max: 98.6 (10 Dec 2024 09:00)  HR: 80 (10 Dec 2024 09:00) (80 - 100)  BP: 108/66 (10 Dec 2024 09:00) (108/66 - 164/78)  BP(mean): --  RR: 18 (10 Dec 2024 09:00) (17 - 18)  SpO2: 98% (10 Dec 2024 09:00) (97% - 99%)    Parameters below as of 10 Dec 2024 09:00  Patient On (Oxygen Delivery Method): room air        I&O's Summary    09 Dec 2024 07:01  -  10 Dec 2024 07:00  --------------------------------------------------------  IN: 1050 mL / OUT: 3635 mL / NET: -2585 mL    10 Dec 2024 07:01  -  10 Dec 2024 14:01  --------------------------------------------------------  IN: 0 mL / OUT: 700 mL / NET: -700 mL                              8.9    8.57  )-----------( 152      ( 10 Dec 2024 06:54 )             25.7     12-10    136  |  102  |  52[H]  ----------------------------<  126[H]  3.1[L]   |  19[L]  |  1.83[H]    Ca    8.8      10 Dec 2024 06:52  Phos  2.8     12-10  Mg     2.1     12-10    TPro  5.6[L]  /  Alb  3.4  /  TBili  0.2  /  DBili  x   /  AST  15  /  ALT  9[L]  /  AlkPhos  45  12-10    Tacrolimus (), Serum: 4.3 ng/mL (12-10 @ 06:52)        Review of systems  Gen: No weight changes, fatigue, fevers/chills, weakness  Skin: No rashes  Head/Eyes/Ears/Mouth: No headache; Normal hearing; Normal vision w/o blurriness; No sinus pain/discomfort, sore throat  Respiratory: No dyspnea, cough, wheezing, hemoptysis  CV: No chest pain, PND, orthopnea  GI:  denies pain at incision site, diarrhea, constipation, nausea, vomiting, melena, hematochezia  : No increased frequency, dysuria, hematuria, nocturia  MSK: No joint pain/swelling; no back pain; no edema  Neuro: No dizziness/lightheadedness, weakness, seizures, numbness, tingling  Heme: No easy bruising or bleeding  Endo: No heat/cold intolerance  Psych: No significant nervousness, anxiety, stress, depression  All other systems were reviewed and are negative, except as noted.      PHYSICAL EXAM:  Constitutional: Well developed / well nourished  Eyes: Anicteric, PERRLA  ENMT: nc/at  Neck: supple  Respiratory: CTA B/L  Cardiovascular: RRR  Gastrointestinal: Soft, non distended, mild tenderness at the incision site; RLQ incision c/d/i JPx2 ss,    Genitourinary: voiding spontaneously   Extremities: SCD's in place and working bilaterally  Vascular: Palpable dp pulses bilaterally  Neurological: A&O x3  Skin: no rashes, ulcerations or lesions;  Musculoskeletal: Moving all extremities  Psychiatric: Responsive    
[facility] DIVISION OF KIDNEY DISEASES AND HYPERTENSION -- FOLLOW UP NOTE  --------------------------------------------------------------------------------  Chief Complaint:  s/p DDRT 12/5/24 (1a/1v/1u +stent) on immmunosuppresion    24 hour events/subjective: Pt. seen and examined at bedside earlier today. No fever, CP, SOB, LE edema, HA or dizziness during rounds today.     PAST HISTORY  --------------------------------------------------------------------------------  No significant changes to PMH, PSH, FHx, SHx, unless otherwise noted    ALLERGIES & MEDICATIONS  --------------------------------------------------------------------------------  Allergies    No Known Allergies    Intolerances      Standing Inpatient Medications  allopurinol 300 milliGRAM(s) Oral daily  amLODIPine   Tablet 10 milliGRAM(s) Oral daily  chlorhexidine 2% Cloths 1 Application(s) Topical daily  dextrose 5%. 1000 milliLiter(s) IV Continuous <Continuous>  dextrose 5%. 1000 milliLiter(s) IV Continuous <Continuous>  dextrose 50% Injectable 25 Gram(s) IV Push once  dextrose 50% Injectable 12.5 Gram(s) IV Push once  dextrose 50% Injectable 25 Gram(s) IV Push once  dextrose Oral Gel 15 Gram(s) Oral once  famotidine    Tablet 20 milliGRAM(s) Oral daily  glucagon  Injectable 1 milliGRAM(s) IntraMuscular once  influenza  Vaccine (HIGH DOSE) 0.5 milliLiter(s) IntraMuscular once  insulin lispro (ADMELOG) corrective regimen sliding scale   SubCutaneous three times a day before meals  insulin lispro (ADMELOG) corrective regimen sliding scale   SubCutaneous at bedtime  labetalol 100 milliGRAM(s) Oral two times a day  melatonin 5 milliGRAM(s) Oral at bedtime  mycophenolate mofetil 500 milliGRAM(s) Oral <User Schedule>  nystatin    Suspension [id number] Unit(s) Swish and Swallow four times a day  polyethylene glycol 3350 17 Gram(s) Oral daily  potassium chloride    Tablet ER 20 milliEquivalent(s) Oral once  rosuvastatin 20 milliGRAM(s) Oral at bedtime  senna 2 Tablet(s) Oral at bedtime  tacrolimus ER Tablet (ENVARSUS XR) 1 milliGRAM(s) Oral once  tacrolimus ER Tablet (ENVARSUS XR) 2 milliGRAM(s) Oral <User Schedule>  tamsulosin 0.4 milliGRAM(s) Oral at bedtime  trimethoprim   80 mG/sulfamethoxazole 400 mG 1 Tablet(s) Oral daily  valGANciclovir 450 milliGRAM(s) Oral daily    PRN Inpatient Medications  ondansetron Injectable 4 milliGRAM(s) IV Push every 6 hours PRN  traMADol 25 milliGRAM(s) Oral every 6 hours PRN  traMADol 50 milliGRAM(s) Oral every 8 hours PRN      REVIEW OF SYSTEMS  --------------------------------------------------------------------------------    All other systems were reviewed and are negative, except as noted.    VITALS/PHYSICAL EXAM  --------------------------------------------------------------------------------  T(C): 37 (12-10-24 @ 09:00), Max: 37 (12-10-24 @ 09:00)  HR: 80 (12-10-24 @ 09:00) (80 - 100)  BP: 108/66 (12-10-24 @ 09:00) (108/66 - 164/78)  RR: 18 (12-10-24 @ 09:00) (17 - 18)  SpO2: 98% (12-10-24 @ 09:00) (97% - 99%)  Wt(kg): --  Height (cm): 165.1 (12-09-24 @ 05:19)  Weight (kg): 63.1 (12-09-24 @ 05:19)  BMI (kg/m2): 23.1 (12-09-24 @ 05:19)  BSA (m2): 1.7 (12-09-24 @ 05:19)      12-09-24 @ 07:01  -  12-10-24 @ 07:00  --------------------------------------------------------  IN: 1050 mL / OUT: 3635 mL / NET: -2585 mL    12-10-24 @ 07:01  -  12-10-24 @ 11:32  --------------------------------------------------------  IN: 0 mL / OUT: 500 mL / NET: -500 mL      Physical Exam:  Gen: NAD, able to speak in full sentences   HEENT: PERRL, MMM   Pulm: CTA B/L, no crackles   CV: RRR, S1S2+  Abd: +BS, soft  Transplant: +drains. incision site C/D/I.   : +agarwal catheter  MSK: no edema   Psych: Normal affect and mood    LABS/STUDIES  --------------------------------------------------------------------------------              8.9    8.57  >-----------<  152      [12-10-24 @ 06:54]              25.7     136  |  102  |  52  ----------------------------<  126      [12-10-24 @ 06:52]  3.1   |  19  |  1.83        Ca     8.8     [12-10-24 @ 06:52]      Mg     2.1     [12-10-24 @ 06:52]      Phos  2.8     [12-10-24 @ 06:52]    TPro  5.6  /  Alb  3.4  /  TBili  0.2  /  DBili  x   /  AST  15  /  ALT  9   /  AlkPhos  45  [12-10-24 @ 06:52]    PT/INR: PT 9.6  , INR 0.84       [12-10-24 @ 06:54]  PTT: 24.6       [12-10-24 @ 06:54]      Creatinine Trend:  SCr 1.83 [12-10 @ 06:52]  SCr 1.81 [12-09 @ 06:43]  SCr 1.90 [12-08 @ 14:17]  SCr 1.97 [12-08 @ 06:43]  SCr 2.65 [12-07 @ 07:10]    Tacrolimus (), Serum: 4.3 ng/mL (12-10 @ 06:52)  Tacrolimus (), Serum: 5.3 ng/mL (12-09 @ 06:39)  Tacrolimus (), Serum: 8.5 ng/mL (12-08 @ 06:43)  Tacrolimus (), Serum: 5.0 ng/mL (12-07 @ 07:09)      HBsAb 305.2      [12-05-24 @ 16:06]  HBsAg Nonreact      [12-05-24 @ 16:06]  HBcAb Reactive      [12-05-24 @ 16:06]  HCV 0.04, Nonreact      [12-05-24 @ 16:06]  HIV Nonreact      [12-05-24 @ 16:06]      
Transplant Surgery - Multidisciplinary Rounds  --------------------------------------------------------------   Tx Date: 12/5/2024        POD#2    HPI: 79 male with PMHx HTN, left inguinal hernia repair, HLD, PKD for past ~12 years recently with increased Cr for past 6 months, makes normal amount of urine, not on HD. Patient is now s/p DDRT 1a/1v/1u+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    Interval Events:  - great UO, downtrending Cr   - VSS     Immunosuppression  Induction: thymo  Maintenance: FK/MMF/SST  Ongoing monitoring for signs of rejection     Potential Discharge date: TBD  Education:  Medications  Plan of care:  See Below      MEDICATIONS  (STANDING):  acetaminophen     Tablet .. 650 milliGRAM(s) Oral once  allopurinol 300 milliGRAM(s) Oral daily  amLODIPine   Tablet 5 milliGRAM(s) Oral daily  antithymocyte globulin rabbit (peripheral) IVPB w/additives 75 milliGRAM(s) IV Intermittent once  chlorhexidine 2% Cloths 1 Application(s) Topical daily  dextrose 5%. 1000 milliLiter(s) (50 mL/Hr) IV Continuous <Continuous>  dextrose 5%. 1000 milliLiter(s) (100 mL/Hr) IV Continuous <Continuous>  dextrose 50% Injectable 25 Gram(s) IV Push once  dextrose 50% Injectable 12.5 Gram(s) IV Push once  dextrose 50% Injectable 25 Gram(s) IV Push once  dextrose Oral Gel 15 Gram(s) Oral once  diphenhydrAMINE 50 milliGRAM(s) Oral once  famotidine    Tablet 20 milliGRAM(s) Oral daily  glucagon  Injectable 1 milliGRAM(s) IntraMuscular once  influenza  Vaccine (HIGH DOSE) 0.5 milliLiter(s) IntraMuscular once  insulin lispro (ADMELOG) corrective regimen sliding scale   SubCutaneous three times a day before meals  insulin lispro (ADMELOG) corrective regimen sliding scale   SubCutaneous at bedtime  labetalol 100 milliGRAM(s) Oral two times a day  mycophenolate mofetil 500 milliGRAM(s) Oral <User Schedule>  nystatin    Suspension 045636 Unit(s) Swish and Swallow four times a day  polyethylene glycol 3350 17 Gram(s) Oral daily  rosuvastatin 20 milliGRAM(s) Oral at bedtime  senna 2 Tablet(s) Oral at bedtime  tacrolimus ER Tablet (ENVARSUS XR) 2 milliGRAM(s) Oral <User Schedule>  tamsulosin 0.4 milliGRAM(s) Oral at bedtime  trimethoprim   80 mG/sulfamethoxazole 400 mG 1 Tablet(s) Oral daily  valGANciclovir 450 milliGRAM(s) Oral daily    MEDICATIONS  (PRN):  ondansetron Injectable 4 milliGRAM(s) IV Push every 6 hours PRN Nausea and/or Vomiting  traMADol 25 milliGRAM(s) Oral every 6 hours PRN Moderate Pain (4 - 6)  traMADol 50 milliGRAM(s) Oral every 8 hours PRN Severe Pain (7 - 10)      PAST MEDICAL & SURGICAL HISTORY:      Vital Signs Last 24 Hrs  T(C): 36.6 (07 Dec 2024 08:08), Max: 37.2 (06 Dec 2024 21:00)  T(F): 97.8 (07 Dec 2024 08:08), Max: 99 (06 Dec 2024 21:00)  HR: 87 (07 Dec 2024 08:08) (87 - 94)  BP: 138/72 (07 Dec 2024 08:08) (135/67 - 161/74)  BP(mean): 99 (07 Dec 2024 08:08) (94 - 103)  RR: 18 (07 Dec 2024 08:08) (18 - 18)  SpO2: 97% (07 Dec 2024 08:08) (95% - 99%)    Parameters below as of 07 Dec 2024 08:08  Patient On (Oxygen Delivery Method): room air        I&O's Summary    06 Dec 2024 07:01  -  07 Dec 2024 07:00  --------------------------------------------------------  IN: 4531 mL / OUT: 4455 mL / NET: 76 mL                              8.6    19.81 )-----------( 182      ( 07 Dec 2024 07:09 )             24.6     12-07    136  |  98  |  50[H]  ----------------------------<  204[H]  3.5   |  21[L]  |  2.65[H]    Ca    8.7      07 Dec 2024 07:10  Phos  5.0     12-07  Mg     2.4     12-07    TPro  5.9[L]  /  Alb  3.4  /  TBili  0.2  /  DBili  x   /  AST  24  /  ALT  13  /  AlkPhos  49  12-07    Tacrolimus (), Serum: 5.0 ng/mL (12-07 @ 07:09)        Review of systems  Gen: No weight changes, fatigue, fevers/chills, weakness  Skin: No rashes  Head/Eyes/Ears/Mouth: No headache; Normal hearing; Normal vision w/o blurriness; No sinus pain/discomfort, sore throat  Respiratory: No dyspnea, cough, wheezing, hemoptysis  CV: No chest pain, PND, orthopnea  GI: Minimal abdominal pain at surgical incision site; denies diarrhea, constipation, nausea, vomiting, melena, hematochezia  : No increased frequency, dysuria, hematuria, nocturia  MSK: No joint pain/swelling; no back pain; no edema  Neuro: No dizziness/lightheadedness, weakness, seizures, numbness, tingling  Heme: No easy bruising or bleeding  Endo: No heat/cold intolerance  Psych: No significant nervousness, anxiety, stress, depression  All other systems were reviewed and are negative, except as noted.      PHYSICAL EXAM:  Constitutional: Well developed / well nourished  Eyes: Anicteric, PERRLA  ENMT: nc/at  Neck: supple  Respiratory: CTA B/L  Cardiovascular: RRR  Gastrointestinal: Soft, non distended, mild tenderness at the incision site; RLQ incision with honeycomb dressing, JPx2 ss,    Genitourinary: Urinary catheter in place  Extremities: SCD's in place and working bilaterally  Vascular: Palpable dp pulses bilaterally  Neurological: A&O x3  Skin: no rashes, ulcerations or lesions;  Musculoskeletal: Moving all extremities  Psychiatric: Responsive

## 2024-12-11 PROBLEM — Z94.0 RENAL TRANSPLANT RECIPIENT: Status: ACTIVE | Noted: 2024-12-06

## 2024-12-12 ENCOUNTER — APPOINTMENT (OUTPATIENT)
Dept: TRANSPLANT | Facility: CLINIC | Age: 79
End: 2024-12-12

## 2024-12-13 LAB
ALBUMIN SERPL ELPH-MCNC: 4 G/DL
ALP BLD-CCNC: 56 U/L
ALT SERPL-CCNC: 19 U/L
ANION GAP SERPL CALC-SCNC: 17 MMOL/L
AST SERPL-CCNC: 14 U/L
BILIRUB SERPL-MCNC: 0.3 MG/DL
BUN SERPL-MCNC: 36 MG/DL
CALCIUM SERPL-MCNC: 9.4 MG/DL
CHLORIDE SERPL-SCNC: 104 MMOL/L
CO2 SERPL-SCNC: 19 MMOL/L
CREAT SERPL-MCNC: 1.65 MG/DL
EGFR: 41 ML/MIN/1.73M2
GLUCOSE SERPL-MCNC: 148 MG/DL
HCT VFR BLD CALC: 28.2 %
HGB BLD-MCNC: 9.9 G/DL
MAGNESIUM SERPL-MCNC: 1.9 MG/DL
MCHC RBC-ENTMCNC: 30.7 PG
MCHC RBC-ENTMCNC: 35.1 G/DL
MCV RBC AUTO: 87.6 FL
PHOSPHATE SERPL-MCNC: 2.8 MG/DL
PLATELET # BLD AUTO: 221 K/UL
POTASSIUM SERPL-SCNC: 3.8 MMOL/L
PROT SERPL-MCNC: 6.3 G/DL
RBC # BLD: 3.22 M/UL
RBC # FLD: 14.6 %
SODIUM SERPL-SCNC: 141 MMOL/L
TACROLIMUS SERPL-MCNC: 5.4 NG/ML
WBC # FLD AUTO: 10.1 K/UL

## 2024-12-13 RX ORDER — TACROLIMUS 4 MG/1
4 TABLET, EXTENDED RELEASE ORAL
Qty: 30 | Refills: 11 | Status: ACTIVE | COMMUNITY
Start: 2024-12-06

## 2024-12-13 RX ORDER — TACROLIMUS 1 MG/1
1 TABLET, EXTENDED RELEASE ORAL
Qty: 90 | Refills: 11 | Status: DISCONTINUED | COMMUNITY
Start: 2024-12-06 | End: 2024-12-13

## 2024-12-16 ENCOUNTER — APPOINTMENT (OUTPATIENT)
Dept: NEPHROLOGY | Facility: CLINIC | Age: 79
End: 2024-12-16
Payer: MEDICARE

## 2024-12-16 VITALS
SYSTOLIC BLOOD PRESSURE: 164 MMHG | OXYGEN SATURATION: 100 % | TEMPERATURE: 98 F | HEART RATE: 73 BPM | RESPIRATION RATE: 16 BRPM | WEIGHT: 138 LBS | BODY MASS INDEX: 24.45 KG/M2 | HEIGHT: 63 IN | DIASTOLIC BLOOD PRESSURE: 72 MMHG

## 2024-12-16 PROBLEM — Z79.899 LONG TERM CURRENT USE OF IMMUNOSUPPRESSIVE DRUG: Status: ACTIVE | Noted: 2024-12-16

## 2024-12-16 PROCEDURE — 99214 OFFICE O/P EST MOD 30 MIN: CPT

## 2024-12-17 ENCOUNTER — LABORATORY RESULT (OUTPATIENT)
Age: 79
End: 2024-12-17

## 2024-12-18 LAB
ALBUMIN SERPL ELPH-MCNC: 3.8 G/DL
ALP BLD-CCNC: 65 U/L
ALT SERPL-CCNC: 9 U/L
ANION GAP SERPL CALC-SCNC: 12 MMOL/L
APPEARANCE: CLEAR
AST SERPL-CCNC: 10 U/L
BASOPHILS # BLD AUTO: 0 K/UL
BASOPHILS NFR BLD AUTO: 0 %
BILIRUB SERPL-MCNC: 0.3 MG/DL
BILIRUBIN URINE: NEGATIVE
BLOOD URINE: NEGATIVE
BUN SERPL-MCNC: 25 MG/DL
CALCIUM SERPL-MCNC: 8.9 MG/DL
CALCIUM SERPL-MCNC: 8.9 MG/DL
CHLORIDE SERPL-SCNC: 109 MMOL/L
CO2 SERPL-SCNC: 18 MMOL/L
COLOR: YELLOW
CREAT SERPL-MCNC: 1.34 MG/DL
EGFR: 53 ML/MIN/1.73M2
EOSINOPHIL # BLD AUTO: 0 K/UL
EOSINOPHIL NFR BLD AUTO: 0 %
GLUCOSE QUALITATIVE U: NEGATIVE MG/DL
GLUCOSE SERPL-MCNC: 197 MG/DL
HCT VFR BLD CALC: 26 %
HGB BLD-MCNC: 8.4 G/DL
KETONES URINE: NEGATIVE MG/DL
LEUKOCYTE ESTERASE URINE: ABNORMAL
LYMPHOCYTES # BLD AUTO: 0.48 K/UL
LYMPHOCYTES NFR BLD AUTO: 5.3 %
MAGNESIUM SERPL-MCNC: 1.4 MG/DL
MAN DIFF?: NORMAL
MCHC RBC-ENTMCNC: 30.1 PG
MCHC RBC-ENTMCNC: 32.3 G/DL
MCV RBC AUTO: 93.2 FL
MONOCYTES # BLD AUTO: 0.08 K/UL
MONOCYTES NFR BLD AUTO: 0.9 %
NEUTROPHILS # BLD AUTO: 8.43 K/UL
NEUTROPHILS NFR BLD AUTO: 93.8 %
NITRITE URINE: NEGATIVE
PARATHYROID HORMONE INTACT: 179 PG/ML
PH URINE: 6
PHOSPHATE SERPL-MCNC: 2.9 MG/DL
PLATELET # BLD AUTO: 382 K/UL
POTASSIUM SERPL-SCNC: 3.7 MMOL/L
PROT SERPL-MCNC: 6 G/DL
PROTEIN URINE: 30 MG/DL
RBC # BLD: 2.79 M/UL
RBC # FLD: 15.5 %
SODIUM SERPL-SCNC: 139 MMOL/L
SPECIFIC GRAVITY URINE: 1.01
TACROLIMUS SERPL-MCNC: 6.2 NG/ML
UROBILINOGEN URINE: 0.2 MG/DL
WBC # FLD AUTO: 8.99 K/UL

## 2024-12-19 LAB — BKV DNA SPEC QL NAA+PROBE: NOT DETECTED IU/ML

## 2024-12-20 ENCOUNTER — NON-APPOINTMENT (OUTPATIENT)
Age: 79
End: 2024-12-20

## 2024-12-20 RX ORDER — OMEGA-3/DHA/EPA/FISH OIL 300-1000MG
400 CAPSULE ORAL
Qty: 30 | Refills: 0 | Status: ACTIVE | COMMUNITY
Start: 2024-12-18 | End: 1900-01-01

## 2024-12-23 ENCOUNTER — LABORATORY RESULT (OUTPATIENT)
Age: 79
End: 2024-12-23

## 2024-12-23 ENCOUNTER — APPOINTMENT (OUTPATIENT)
Dept: NEPHROLOGY | Facility: CLINIC | Age: 79
End: 2024-12-23
Payer: MEDICARE

## 2024-12-23 VITALS
HEIGHT: 63 IN | SYSTOLIC BLOOD PRESSURE: 136 MMHG | TEMPERATURE: 98 F | OXYGEN SATURATION: 99 % | HEART RATE: 72 BPM | BODY MASS INDEX: 22.5 KG/M2 | WEIGHT: 127 LBS | RESPIRATION RATE: 16 BRPM | DIASTOLIC BLOOD PRESSURE: 72 MMHG

## 2024-12-23 DIAGNOSIS — I10 ESSENTIAL (PRIMARY) HYPERTENSION: ICD-10-CM

## 2024-12-23 DIAGNOSIS — Z79.899 OTHER LONG TERM (CURRENT) DRUG THERAPY: ICD-10-CM

## 2024-12-23 PROCEDURE — 99214 OFFICE O/P EST MOD 30 MIN: CPT

## 2024-12-24 ENCOUNTER — NON-APPOINTMENT (OUTPATIENT)
Age: 79
End: 2024-12-24

## 2024-12-24 LAB
ALBUMIN SERPL ELPH-MCNC: 4.1 G/DL
ALP BLD-CCNC: 86 U/L
ALT SERPL-CCNC: 8 U/L
ANION GAP SERPL CALC-SCNC: 14 MMOL/L
APPEARANCE: CLEAR
AST SERPL-CCNC: 10 U/L
BASOPHILS # BLD AUTO: 0.03 K/UL
BASOPHILS NFR BLD AUTO: 0.4 %
BILIRUB SERPL-MCNC: 0.2 MG/DL
BILIRUBIN URINE: NEGATIVE
BLOOD URINE: NEGATIVE
BUN SERPL-MCNC: 30 MG/DL
CALCIUM SERPL-MCNC: 9.6 MG/DL
CALCIUM SERPL-MCNC: 9.6 MG/DL
CHLORIDE SERPL-SCNC: 106 MMOL/L
CO2 SERPL-SCNC: 18 MMOL/L
COLOR: YELLOW
CREAT SERPL-MCNC: 1.73 MG/DL
CREAT SPEC-SCNC: 132 MG/DL
CREAT/PROT UR: 0.3 RATIO
EGFR: 39 ML/MIN/1.73M2
EOSINOPHIL # BLD AUTO: 0.09 K/UL
EOSINOPHIL NFR BLD AUTO: 1.3 %
GLUCOSE QUALITATIVE U: NEGATIVE MG/DL
GLUCOSE SERPL-MCNC: 152 MG/DL
HCT VFR BLD CALC: 24.9 %
HGB BLD-MCNC: 8.5 G/DL
IMM GRANULOCYTES NFR BLD AUTO: 0.6 %
KETONES URINE: NEGATIVE MG/DL
LEUKOCYTE ESTERASE URINE: ABNORMAL
LYMPHOCYTES # BLD AUTO: 0.34 K/UL
LYMPHOCYTES NFR BLD AUTO: 5 %
MAGNESIUM SERPL-MCNC: 1.5 MG/DL
MAN DIFF?: NORMAL
MCHC RBC-ENTMCNC: 30.1 PG
MCHC RBC-ENTMCNC: 34.1 G/DL
MCV RBC AUTO: 88.3 FL
MONOCYTES # BLD AUTO: 0.34 K/UL
MONOCYTES NFR BLD AUTO: 5 %
NEUTROPHILS # BLD AUTO: 5.98 K/UL
NEUTROPHILS NFR BLD AUTO: 87.7 %
NITRITE URINE: NEGATIVE
PARATHYROID HORMONE INTACT: 139 PG/ML
PH URINE: 5.5
PHOSPHATE SERPL-MCNC: 3.9 MG/DL
PLATELET # BLD AUTO: 319 K/UL
POTASSIUM SERPL-SCNC: 4.7 MMOL/L
PROT SERPL-MCNC: 6.7 G/DL
PROT UR-MCNC: 37 MG/DL
PROTEIN URINE: 30 MG/DL
RBC # BLD: 2.82 M/UL
RBC # FLD: 15.8 %
SODIUM SERPL-SCNC: 138 MMOL/L
SPECIFIC GRAVITY URINE: 1.02
TACROLIMUS SERPL-MCNC: 8.9 NG/ML
UROBILINOGEN URINE: 0.2 MG/DL
WBC # FLD AUTO: 6.82 K/UL

## 2024-12-26 LAB — BKV DNA SPEC QL NAA+PROBE: NOT DETECTED IU/ML

## 2024-12-30 ENCOUNTER — LABORATORY RESULT (OUTPATIENT)
Age: 79
End: 2024-12-30

## 2024-12-30 ENCOUNTER — APPOINTMENT (OUTPATIENT)
Dept: TRANSPLANT | Facility: CLINIC | Age: 79
End: 2024-12-30
Payer: MEDICARE

## 2024-12-30 ENCOUNTER — NON-APPOINTMENT (OUTPATIENT)
Age: 79
End: 2024-12-30

## 2024-12-30 VITALS
WEIGHT: 125 LBS | HEART RATE: 87 BPM | BODY MASS INDEX: 22.15 KG/M2 | DIASTOLIC BLOOD PRESSURE: 71 MMHG | OXYGEN SATURATION: 97 % | HEIGHT: 63 IN | RESPIRATION RATE: 16 BRPM | TEMPERATURE: 97.5 F | SYSTOLIC BLOOD PRESSURE: 134 MMHG

## 2024-12-30 DIAGNOSIS — Z94.0 KIDNEY TRANSPLANT STATUS: ICD-10-CM

## 2024-12-30 PROCEDURE — 99215 OFFICE O/P EST HI 40 MIN: CPT | Mod: 24

## 2024-12-31 LAB
ALBUMIN SERPL ELPH-MCNC: 3.8 G/DL
ALP BLD-CCNC: 103 U/L
ALT SERPL-CCNC: 6 U/L
ANION GAP SERPL CALC-SCNC: 14 MMOL/L
APPEARANCE: CLEAR
AST SERPL-CCNC: 8 U/L
BASOPHILS # BLD AUTO: 0.02 K/UL
BASOPHILS NFR BLD AUTO: 0.3 %
BILIRUB SERPL-MCNC: 0.2 MG/DL
BILIRUBIN URINE: NEGATIVE
BLOOD URINE: NEGATIVE
BUN SERPL-MCNC: 28 MG/DL
CALCIUM SERPL-MCNC: 9 MG/DL
CALCIUM SERPL-MCNC: 9 MG/DL
CHLORIDE SERPL-SCNC: 105 MMOL/L
CO2 SERPL-SCNC: 16 MMOL/L
COLOR: YELLOW
CREAT SERPL-MCNC: 1.98 MG/DL
CREAT SPEC-SCNC: 122 MG/DL
CREAT/PROT UR: 0.3 RATIO
EGFR: 33 ML/MIN/1.73M2
EOSINOPHIL # BLD AUTO: 0.04 K/UL
EOSINOPHIL NFR BLD AUTO: 0.6 %
GLUCOSE QUALITATIVE U: NEGATIVE MG/DL
GLUCOSE SERPL-MCNC: 221 MG/DL
HCT VFR BLD CALC: 25.7 %
HGB BLD-MCNC: 8.4 G/DL
IMM GRANULOCYTES NFR BLD AUTO: 0.6 %
KETONES URINE: NEGATIVE MG/DL
LEUKOCYTE ESTERASE URINE: ABNORMAL
LYMPHOCYTES # BLD AUTO: 0.42 K/UL
LYMPHOCYTES NFR BLD AUTO: 6.5 %
MAGNESIUM SERPL-MCNC: 1.3 MG/DL
MAN DIFF?: NORMAL
MCHC RBC-ENTMCNC: 30.2 PG
MCHC RBC-ENTMCNC: 32.7 G/DL
MCV RBC AUTO: 92.4 FL
MONOCYTES # BLD AUTO: 0.32 K/UL
MONOCYTES NFR BLD AUTO: 5 %
NEUTROPHILS # BLD AUTO: 5.61 K/UL
NEUTROPHILS NFR BLD AUTO: 87 %
NITRITE URINE: NEGATIVE
PARATHYROID HORMONE INTACT: 170 PG/ML
PH URINE: 5.5
PHOSPHATE SERPL-MCNC: 3.8 MG/DL
PLATELET # BLD AUTO: 424 K/UL
POTASSIUM SERPL-SCNC: 5 MMOL/L
PROT SERPL-MCNC: 6.3 G/DL
PROT UR-MCNC: 39 MG/DL
PROTEIN URINE: 100 MG/DL
RBC # BLD: 2.78 M/UL
RBC # FLD: 16 %
SODIUM SERPL-SCNC: 135 MMOL/L
SPECIFIC GRAVITY URINE: 1.02
TACROLIMUS SERPL-MCNC: 9.9 NG/ML
UROBILINOGEN URINE: 0.2 MG/DL
WBC # FLD AUTO: 6.45 K/UL

## 2025-01-02 ENCOUNTER — NON-APPOINTMENT (OUTPATIENT)
Age: 80
End: 2025-01-02

## 2025-01-02 LAB
BKV DNA SPEC QL NAA+PROBE: NOT DETECTED IU/ML
FERRITIN SERPL-MCNC: 370 NG/ML
FOLATE SERPL-MCNC: 18 NG/ML
IRON SATN MFR SERPL: 5 %
IRON SERPL-MCNC: 14 UG/DL
TIBC SERPL-MCNC: 263 UG/DL
UIBC SERPL-MCNC: 250 UG/DL
VIT B12 SERPL-MCNC: 781 PG/ML

## 2025-01-02 RX ORDER — IRON SUCROSE 20 MG/ML
20 INJECTION, SOLUTION INTRAVENOUS
Qty: 1 | Refills: 5 | Status: ACTIVE | OUTPATIENT
Start: 2025-01-02

## 2025-01-03 RX ORDER — FERROUS GLUCONATE 324(38)MG
324 (38 FE) TABLET ORAL
Qty: 60 | Refills: 6 | Status: ACTIVE | COMMUNITY
Start: 2025-01-02 | End: 1900-01-01

## 2025-01-06 ENCOUNTER — LABORATORY RESULT (OUTPATIENT)
Age: 80
End: 2025-01-06

## 2025-01-06 ENCOUNTER — APPOINTMENT (OUTPATIENT)
Dept: TRANSPLANT | Facility: CLINIC | Age: 80
End: 2025-01-06

## 2025-01-06 ENCOUNTER — APPOINTMENT (OUTPATIENT)
Dept: NEPHROLOGY | Facility: CLINIC | Age: 80
End: 2025-01-06
Payer: MEDICARE

## 2025-01-06 VITALS
TEMPERATURE: 97.5 F | RESPIRATION RATE: 16 BRPM | SYSTOLIC BLOOD PRESSURE: 134 MMHG | DIASTOLIC BLOOD PRESSURE: 67 MMHG | HEIGHT: 63 IN | BODY MASS INDEX: 21.97 KG/M2 | OXYGEN SATURATION: 96 % | WEIGHT: 124 LBS | HEART RATE: 108 BPM

## 2025-01-06 PROCEDURE — 99214 OFFICE O/P EST MOD 30 MIN: CPT

## 2025-01-06 RX ORDER — PREDNISONE 5 MG/1
5 TABLET ORAL
Qty: 30 | Refills: 11 | Status: ACTIVE | COMMUNITY
Start: 2025-01-06 | End: 1900-01-01

## 2025-01-06 RX ORDER — IRON SUCROSE 20 MG/ML
20 INJECTION, SOLUTION INTRAVENOUS
Refills: 0 | Status: ACTIVE | OUTPATIENT
Start: 2025-01-06 | End: 1900-01-01

## 2025-01-07 LAB
ALBUMIN SERPL ELPH-MCNC: 4 G/DL
ALP BLD-CCNC: 100 U/L
ALT SERPL-CCNC: 6 U/L
ANION GAP SERPL CALC-SCNC: 16 MMOL/L
APPEARANCE: CLEAR
AST SERPL-CCNC: 7 U/L
BASOPHILS # BLD AUTO: 0.03 K/UL
BASOPHILS NFR BLD AUTO: 0.4 %
BILIRUB SERPL-MCNC: <0.2 MG/DL
BILIRUBIN URINE: NEGATIVE
BLOOD URINE: NEGATIVE
BUN SERPL-MCNC: 31 MG/DL
CALCIUM SERPL-MCNC: 9.7 MG/DL
CALCIUM SERPL-MCNC: 9.7 MG/DL
CHLORIDE SERPL-SCNC: 107 MMOL/L
CO2 SERPL-SCNC: 16 MMOL/L
COLOR: YELLOW
CREAT SERPL-MCNC: 1.54 MG/DL
CREAT SPEC-SCNC: 100 MG/DL
CREAT/PROT UR: 0.3 RATIO
EGFR: 45 ML/MIN/1.73M2
EOSINOPHIL # BLD AUTO: 0.07 K/UL
EOSINOPHIL NFR BLD AUTO: 1 %
GLUCOSE QUALITATIVE U: NEGATIVE MG/DL
GLUCOSE SERPL-MCNC: 159 MG/DL
HBV SURFACE AG SER QL: NONREACTIVE
HCT VFR BLD CALC: 27.5 %
HCV AB SER QL: NONREACTIVE
HCV RNA SERPL NAA+PROBE-LOG IU: NOT DETECTED LOGIU/ML
HCV S/CO RATIO: 0.08 S/CO
HEPB DNA PCR INT: NOT DETECTED
HEPB DNA PCR LOG: NOT DETECTED LOGIU/ML
HEPC RNA INTERP: NOT DETECTED
HGB BLD-MCNC: 9.2 G/DL
HIV1 RNA # SERPL NAA+PROBE: NORMAL
HIV1 RNA # SERPL NAA+PROBE: NORMAL COPIES/ML
HIV1+2 AB SPEC QL IA.RAPID: NONREACTIVE
IMM GRANULOCYTES NFR BLD AUTO: 0.6 %
KETONES URINE: NEGATIVE MG/DL
LEUKOCYTE ESTERASE URINE: ABNORMAL
LYMPHOCYTES # BLD AUTO: 0.59 K/UL
LYMPHOCYTES NFR BLD AUTO: 8.4 %
MAGNESIUM SERPL-MCNC: 1.4 MG/DL
MAN DIFF?: NORMAL
MCHC RBC-ENTMCNC: 29.8 PG
MCHC RBC-ENTMCNC: 33.5 G/DL
MCV RBC AUTO: 89 FL
MONOCYTES # BLD AUTO: 0.41 K/UL
MONOCYTES NFR BLD AUTO: 5.9 %
NEUTROPHILS # BLD AUTO: 5.85 K/UL
NEUTROPHILS NFR BLD AUTO: 83.7 %
NITRITE URINE: NEGATIVE
PARATHYROID HORMONE INTACT: 117 PG/ML
PH URINE: 5.5
PHOSPHATE SERPL-MCNC: 3.9 MG/DL
PLATELET # BLD AUTO: 521 K/UL
POTASSIUM SERPL-SCNC: 5 MMOL/L
PROT SERPL-MCNC: 6.6 G/DL
PROT UR-MCNC: 34 MG/DL
PROTEIN URINE: 30 MG/DL
RBC # BLD: 3.09 M/UL
RBC # FLD: 16 %
SODIUM SERPL-SCNC: 138 MMOL/L
SPECIFIC GRAVITY URINE: 1.01
TACROLIMUS SERPL-MCNC: 8.8 NG/ML
UROBILINOGEN URINE: 0.2 MG/DL
VIRAL LOAD INTERP: NORMAL
VIRAL LOAD LOG: NORMAL LG COP/ML
WBC # FLD AUTO: 6.99 K/UL

## 2025-01-09 LAB — BKV DNA SPEC QL NAA+PROBE: NOT DETECTED IU/ML

## 2025-01-13 ENCOUNTER — APPOINTMENT (OUTPATIENT)
Dept: RHEUMATOLOGY | Facility: CLINIC | Age: 80
End: 2025-01-13

## 2025-01-13 VITALS
RESPIRATION RATE: 15 BRPM | SYSTOLIC BLOOD PRESSURE: 129 MMHG | OXYGEN SATURATION: 98 % | TEMPERATURE: 97.7 F | HEART RATE: 75 BPM | DIASTOLIC BLOOD PRESSURE: 71 MMHG

## 2025-01-13 VITALS
TEMPERATURE: 97.2 F | DIASTOLIC BLOOD PRESSURE: 70 MMHG | HEART RATE: 72 BPM | OXYGEN SATURATION: 98 % | RESPIRATION RATE: 14 BRPM | SYSTOLIC BLOOD PRESSURE: 119 MMHG

## 2025-01-13 PROCEDURE — 96365 THER/PROPH/DIAG IV INF INIT: CPT

## 2025-01-13 RX ORDER — IRON SUCROSE 20 MG/ML
20 INJECTION, SOLUTION INTRAVENOUS
Qty: 0 | Refills: 0 | Status: COMPLETED | OUTPATIENT
Start: 2025-01-06

## 2025-01-15 ENCOUNTER — APPOINTMENT (OUTPATIENT)
Dept: RHEUMATOLOGY | Facility: CLINIC | Age: 80
End: 2025-01-15

## 2025-01-15 VITALS
HEART RATE: 74 BPM | DIASTOLIC BLOOD PRESSURE: 69 MMHG | OXYGEN SATURATION: 98 % | TEMPERATURE: 97.7 F | RESPIRATION RATE: 14 BRPM | SYSTOLIC BLOOD PRESSURE: 136 MMHG

## 2025-01-15 VITALS
RESPIRATION RATE: 15 BRPM | DIASTOLIC BLOOD PRESSURE: 61 MMHG | HEART RATE: 75 BPM | SYSTOLIC BLOOD PRESSURE: 127 MMHG | OXYGEN SATURATION: 99 % | TEMPERATURE: 97 F

## 2025-01-15 PROCEDURE — 96365 THER/PROPH/DIAG IV INF INIT: CPT

## 2025-01-15 RX ORDER — IRON SUCROSE 20 MG/ML
20 INJECTION, SOLUTION INTRAVENOUS
Qty: 0 | Refills: 0 | Status: COMPLETED | OUTPATIENT
Start: 2025-01-06

## 2025-01-17 ENCOUNTER — APPOINTMENT (OUTPATIENT)
Dept: RHEUMATOLOGY | Facility: CLINIC | Age: 80
End: 2025-01-17

## 2025-01-17 VITALS
TEMPERATURE: 98.3 F | SYSTOLIC BLOOD PRESSURE: 144 MMHG | RESPIRATION RATE: 16 BRPM | DIASTOLIC BLOOD PRESSURE: 69 MMHG | HEART RATE: 89 BPM | OXYGEN SATURATION: 97 %

## 2025-01-17 VITALS
RESPIRATION RATE: 16 BRPM | SYSTOLIC BLOOD PRESSURE: 126 MMHG | DIASTOLIC BLOOD PRESSURE: 67 MMHG | HEART RATE: 80 BPM | OXYGEN SATURATION: 99 %

## 2025-01-17 PROCEDURE — 96365 THER/PROPH/DIAG IV INF INIT: CPT

## 2025-01-17 RX ORDER — IRON SUCROSE 20 MG/ML
20 INJECTION, SOLUTION INTRAVENOUS
Qty: 0 | Refills: 0 | Status: COMPLETED | OUTPATIENT
Start: 2025-01-10

## 2025-01-21 ENCOUNTER — APPOINTMENT (OUTPATIENT)
Dept: RHEUMATOLOGY | Facility: CLINIC | Age: 80
End: 2025-01-21

## 2025-01-21 VITALS
RESPIRATION RATE: 16 BRPM | HEART RATE: 78 BPM | DIASTOLIC BLOOD PRESSURE: 72 MMHG | SYSTOLIC BLOOD PRESSURE: 131 MMHG | OXYGEN SATURATION: 98 %

## 2025-01-21 VITALS
HEART RATE: 85 BPM | TEMPERATURE: 97.7 F | DIASTOLIC BLOOD PRESSURE: 66 MMHG | OXYGEN SATURATION: 97 % | RESPIRATION RATE: 16 BRPM | SYSTOLIC BLOOD PRESSURE: 138 MMHG

## 2025-01-21 PROCEDURE — 96365 THER/PROPH/DIAG IV INF INIT: CPT

## 2025-01-22 ENCOUNTER — NON-APPOINTMENT (OUTPATIENT)
Age: 80
End: 2025-01-22

## 2025-01-23 ENCOUNTER — APPOINTMENT (OUTPATIENT)
Dept: INFECTIOUS DISEASE | Facility: CLINIC | Age: 80
End: 2025-01-23
Payer: MEDICARE

## 2025-01-23 ENCOUNTER — APPOINTMENT (OUTPATIENT)
Dept: NEPHROLOGY | Facility: CLINIC | Age: 80
End: 2025-01-23
Payer: MEDICARE

## 2025-01-23 ENCOUNTER — APPOINTMENT (OUTPATIENT)
Dept: RHEUMATOLOGY | Facility: CLINIC | Age: 80
End: 2025-01-23

## 2025-01-23 VITALS
BODY MASS INDEX: 22.15 KG/M2 | OXYGEN SATURATION: 98 % | RESPIRATION RATE: 16 BRPM | HEIGHT: 63 IN | SYSTOLIC BLOOD PRESSURE: 137 MMHG | TEMPERATURE: 98.1 F | WEIGHT: 125 LBS | HEART RATE: 75 BPM | DIASTOLIC BLOOD PRESSURE: 71 MMHG

## 2025-01-23 VITALS
OXYGEN SATURATION: 98 % | SYSTOLIC BLOOD PRESSURE: 145 MMHG | HEART RATE: 100 BPM | DIASTOLIC BLOOD PRESSURE: 67 MMHG | RESPIRATION RATE: 16 BRPM

## 2025-01-23 VITALS
HEART RATE: 100 BPM | DIASTOLIC BLOOD PRESSURE: 75 MMHG | TEMPERATURE: 98.7 F | RESPIRATION RATE: 17 BRPM | SYSTOLIC BLOOD PRESSURE: 156 MMHG | OXYGEN SATURATION: 98 %

## 2025-01-23 DIAGNOSIS — Z91.89 OTHER SPECIFIED PERSONAL RISK FACTORS, NOT ELSEWHERE CLASSIFIED: ICD-10-CM

## 2025-01-23 DIAGNOSIS — Z94.0 KIDNEY TRANSPLANT STATUS: ICD-10-CM

## 2025-01-23 DIAGNOSIS — Z79.60 LONG TERM (CURRENT) USE OF UNSPECIFIED IMMUNOMODULATORS AND IMMUNOSUPPRESSANTS: ICD-10-CM

## 2025-01-23 DIAGNOSIS — I10 ESSENTIAL (PRIMARY) HYPERTENSION: ICD-10-CM

## 2025-01-23 PROCEDURE — 96365 THER/PROPH/DIAG IV INF INIT: CPT

## 2025-01-23 PROCEDURE — 99214 OFFICE O/P EST MOD 30 MIN: CPT

## 2025-01-23 PROCEDURE — 99215 OFFICE O/P EST HI 40 MIN: CPT | Mod: 25

## 2025-01-23 PROCEDURE — 96372 THER/PROPH/DIAG INJ SC/IM: CPT

## 2025-01-23 RX ORDER — MYCOPHENILIC ACID 180 MG/1
180 TABLET, DELAYED RELEASE ORAL
Qty: 60 | Refills: 11 | Status: ACTIVE | COMMUNITY
Start: 2025-01-23 | End: 1900-01-01

## 2025-01-23 RX ORDER — SODIUM BICARBONATE 650 MG/1
650 TABLET ORAL
Qty: 60 | Refills: 3 | Status: ACTIVE | COMMUNITY
Start: 2025-01-23 | End: 1900-01-01

## 2025-01-23 RX ORDER — PENICILLIN G BENZATHINE 2400000 [IU]/4ML
2400000 INJECTION, SUSPENSION INTRAMUSCULAR
Qty: 0 | Refills: 0 | Status: COMPLETED | OUTPATIENT
Start: 2025-01-23

## 2025-01-23 RX ADMIN — PENICILLIN G BENZATHINE 0 UNIT/4ML: 2400000 INJECTION, SUSPENSION INTRAMUSCULAR at 00:00

## 2025-01-24 ENCOUNTER — NON-APPOINTMENT (OUTPATIENT)
Age: 80
End: 2025-01-24

## 2025-01-24 LAB
ALBUMIN SERPL ELPH-MCNC: 4.5 G/DL
ALP BLD-CCNC: 73 U/L
ALT SERPL-CCNC: 6 U/L
ANION GAP SERPL CALC-SCNC: 14 MMOL/L
APPEARANCE: CLEAR
AST SERPL-CCNC: 8 U/L
BACTERIA: NEGATIVE /HPF
BASOPHILS # BLD AUTO: 0.09 K/UL
BASOPHILS NFR BLD AUTO: 1.5 %
BILIRUB SERPL-MCNC: 0.2 MG/DL
BILIRUBIN URINE: NEGATIVE
BKV DNA SPEC QL NAA+PROBE: NOT DETECTED IU/ML
BLOOD URINE: NEGATIVE
BUN SERPL-MCNC: 32 MG/DL
CALCIUM SERPL-MCNC: 9.5 MG/DL
CALCIUM SERPL-MCNC: 9.5 MG/DL
CAST: 4 /LPF
CHLORIDE SERPL-SCNC: 109 MMOL/L
CO2 SERPL-SCNC: 16 MMOL/L
COLOR: YELLOW
CREAT SERPL-MCNC: 1.47 MG/DL
CREAT SPEC-SCNC: 85 MG/DL
CREAT/PROT UR: 0.3 RATIO
EGFR: 48 ML/MIN/1.73M2
EOSINOPHIL # BLD AUTO: 0.14 K/UL
EOSINOPHIL NFR BLD AUTO: 2.3 %
EPITHELIAL CELLS: 1 /HPF
GLUCOSE QUALITATIVE U: NEGATIVE MG/DL
GLUCOSE SERPL-MCNC: 160 MG/DL
HCT VFR BLD CALC: 29.2 %
HGB BLD-MCNC: 9.6 G/DL
IMM GRANULOCYTES NFR BLD AUTO: 0.8 %
KETONES URINE: NEGATIVE MG/DL
LEUKOCYTE ESTERASE URINE: ABNORMAL
LYMPHOCYTES # BLD AUTO: 0.69 K/UL
LYMPHOCYTES NFR BLD AUTO: 11.2 %
MAGNESIUM SERPL-MCNC: 1.6 MG/DL
MAN DIFF?: NORMAL
MCHC RBC-ENTMCNC: 31.3 PG
MCHC RBC-ENTMCNC: 32.9 G/DL
MCV RBC AUTO: 95.1 FL
MICROSCOPIC-UA: NORMAL
MONOCYTES # BLD AUTO: 0.46 K/UL
MONOCYTES NFR BLD AUTO: 7.4 %
NEUTROPHILS # BLD AUTO: 4.75 K/UL
NEUTROPHILS NFR BLD AUTO: 76.8 %
NITRITE URINE: NEGATIVE
PARATHYROID HORMONE INTACT: 206 PG/ML
PH URINE: 5.5
PHOSPHATE SERPL-MCNC: 3.9 MG/DL
PLATELET # BLD AUTO: 410 K/UL
POTASSIUM SERPL-SCNC: 4.7 MMOL/L
PROT SERPL-MCNC: 6.8 G/DL
PROT UR-MCNC: 26 MG/DL
PROTEIN URINE: 30 MG/DL
RBC # BLD: 3.07 M/UL
RBC # FLD: 19 %
RED BLOOD CELLS URINE: 0 /HPF
SODIUM SERPL-SCNC: 139 MMOL/L
SPECIFIC GRAVITY URINE: 1.01
TACROLIMUS SERPL-MCNC: 14.7 NG/ML
UROBILINOGEN URINE: 0.2 MG/DL
WBC # FLD AUTO: 6.18 K/UL
WHITE BLOOD CELLS URINE: 3 /HPF

## 2025-01-28 ENCOUNTER — APPOINTMENT (OUTPATIENT)
Dept: TRANSPLANT | Facility: CLINIC | Age: 80
End: 2025-01-28

## 2025-01-28 PROCEDURE — 52310 CYSTOSCOPY AND TREATMENT: CPT | Mod: 58

## 2025-01-29 LAB — T PALLIDUM AB SER QL IA: NEGATIVE

## 2025-01-30 ENCOUNTER — NON-APPOINTMENT (OUTPATIENT)
Age: 80
End: 2025-01-30

## 2025-01-30 LAB — TACROLIMUS SERPL-MCNC: 7.2 NG/ML

## 2025-02-03 ENCOUNTER — APPOINTMENT (OUTPATIENT)
Dept: NEPHROLOGY | Facility: CLINIC | Age: 80
End: 2025-02-03
Payer: MEDICARE

## 2025-02-03 VITALS
DIASTOLIC BLOOD PRESSURE: 79 MMHG | WEIGHT: 128 LBS | SYSTOLIC BLOOD PRESSURE: 172 MMHG | TEMPERATURE: 98.7 F | HEIGHT: 63 IN | BODY MASS INDEX: 22.68 KG/M2 | HEART RATE: 83 BPM | OXYGEN SATURATION: 99 % | RESPIRATION RATE: 16 BRPM

## 2025-02-03 DIAGNOSIS — Z94.0 KIDNEY TRANSPLANT STATUS: ICD-10-CM

## 2025-02-03 PROCEDURE — 99214 OFFICE O/P EST MOD 30 MIN: CPT

## 2025-02-04 ENCOUNTER — NON-APPOINTMENT (OUTPATIENT)
Age: 80
End: 2025-02-04

## 2025-02-04 LAB
ALBUMIN SERPL ELPH-MCNC: 4.4 G/DL
ALP BLD-CCNC: 62 U/L
ALT SERPL-CCNC: 6 U/L
ANION GAP SERPL CALC-SCNC: 11 MMOL/L
APPEARANCE: CLEAR
AST SERPL-CCNC: 9 U/L
BACTERIA: NEGATIVE /HPF
BASOPHILS # BLD AUTO: 0.1 K/UL
BASOPHILS NFR BLD AUTO: 1.4 %
BILIRUB SERPL-MCNC: 0.2 MG/DL
BILIRUBIN URINE: NEGATIVE
BLOOD URINE: NEGATIVE
BUN SERPL-MCNC: 28 MG/DL
CALCIUM SERPL-MCNC: 10 MG/DL
CAST: 1 /LPF
CHLORIDE SERPL-SCNC: 108 MMOL/L
CO2 SERPL-SCNC: 22 MMOL/L
COLOR: YELLOW
CREAT SERPL-MCNC: 1.35 MG/DL
CREAT SPEC-SCNC: 89 MG/DL
CREAT/PROT UR: 0.3 RATIO
EGFR: 53 ML/MIN/1.73M2
EOSINOPHIL # BLD AUTO: 0.1 K/UL
EOSINOPHIL NFR BLD AUTO: 1.4 %
EPITHELIAL CELLS: 1 /HPF
GLUCOSE QUALITATIVE U: NEGATIVE MG/DL
GLUCOSE SERPL-MCNC: 104 MG/DL
HCT VFR BLD CALC: 31.6 %
HGB BLD-MCNC: 10.3 G/DL
IMM GRANULOCYTES NFR BLD AUTO: 1.2 %
KETONES URINE: NEGATIVE MG/DL
LEUKOCYTE ESTERASE URINE: NEGATIVE
LYMPHOCYTES # BLD AUTO: 0.93 K/UL
LYMPHOCYTES NFR BLD AUTO: 12.7 %
MAGNESIUM SERPL-MCNC: 1.8 MG/DL
MAN DIFF?: NORMAL
MCHC RBC-ENTMCNC: 32 PG
MCHC RBC-ENTMCNC: 32.6 G/DL
MCV RBC AUTO: 98.1 FL
MICROSCOPIC-UA: NORMAL
MONOCYTES # BLD AUTO: 0.6 K/UL
MONOCYTES NFR BLD AUTO: 8.2 %
NEUTROPHILS # BLD AUTO: 5.49 K/UL
NEUTROPHILS NFR BLD AUTO: 75.1 %
NITRITE URINE: NEGATIVE
PH URINE: 6
PHOSPHATE SERPL-MCNC: 3.5 MG/DL
PLATELET # BLD AUTO: 298 K/UL
POTASSIUM SERPL-SCNC: 4.6 MMOL/L
PROT SERPL-MCNC: 6.7 G/DL
PROT UR-MCNC: 31 MG/DL
PROTEIN URINE: 30 MG/DL
RBC # BLD: 3.22 M/UL
RBC # FLD: 18.6 %
RED BLOOD CELLS URINE: 1 /HPF
SODIUM SERPL-SCNC: 141 MMOL/L
SPECIFIC GRAVITY URINE: 1.02
TACROLIMUS SERPL-MCNC: 8.3 NG/ML
UROBILINOGEN URINE: 0.2 MG/DL
WBC # FLD AUTO: 7.31 K/UL
WHITE BLOOD CELLS URINE: 2 /HPF

## 2025-02-05 LAB — BKV DNA SPEC QL NAA+PROBE: NOT DETECTED IU/ML

## 2025-02-20 ENCOUNTER — APPOINTMENT (OUTPATIENT)
Dept: NEPHROLOGY | Facility: CLINIC | Age: 80
End: 2025-02-20
Payer: MEDICARE

## 2025-02-20 VITALS
HEIGHT: 63 IN | DIASTOLIC BLOOD PRESSURE: 77 MMHG | SYSTOLIC BLOOD PRESSURE: 152 MMHG | TEMPERATURE: 97.9 F | RESPIRATION RATE: 16 BRPM | OXYGEN SATURATION: 98 % | BODY MASS INDEX: 23.39 KG/M2 | HEART RATE: 94 BPM | WEIGHT: 132 LBS

## 2025-02-20 DIAGNOSIS — I10 ESSENTIAL (PRIMARY) HYPERTENSION: ICD-10-CM

## 2025-02-20 DIAGNOSIS — Z79.60 LONG TERM (CURRENT) USE OF UNSPECIFIED IMMUNOMODULATORS AND IMMUNOSUPPRESSANTS: ICD-10-CM

## 2025-02-20 LAB
ALBUMIN SERPL ELPH-MCNC: 4.3 G/DL
ALP BLD-CCNC: 67 U/L
ALT SERPL-CCNC: 7 U/L
ANION GAP SERPL CALC-SCNC: 13 MMOL/L
APPEARANCE: CLEAR
AST SERPL-CCNC: 11 U/L
BACTERIA: NEGATIVE /HPF
BASOPHILS # BLD AUTO: 0.04 K/UL
BASOPHILS NFR BLD AUTO: 0.6 %
BILIRUB SERPL-MCNC: <0.2 MG/DL
BILIRUBIN URINE: NEGATIVE
BLOOD URINE: NEGATIVE
BUN SERPL-MCNC: 29 MG/DL
CALCIUM SERPL-MCNC: 9.7 MG/DL
CALCIUM SERPL-MCNC: 9.7 MG/DL
CAST: 2 /LPF
CHLORIDE SERPL-SCNC: 108 MMOL/L
CO2 SERPL-SCNC: 20 MMOL/L
COLOR: YELLOW
CREAT SERPL-MCNC: 1.2 MG/DL
CREAT SPEC-SCNC: 100 MG/DL
CREAT/PROT UR: 0.3 RATIO
EGFR: 61 ML/MIN/1.73M2
EOSINOPHIL # BLD AUTO: 0.08 K/UL
EOSINOPHIL NFR BLD AUTO: 1.2 %
EPITHELIAL CELLS: 0 /HPF
GLUCOSE QUALITATIVE U: NEGATIVE MG/DL
GLUCOSE SERPL-MCNC: 123 MG/DL
HCT VFR BLD CALC: 32.4 %
HGB BLD-MCNC: 10.6 G/DL
IMM GRANULOCYTES NFR BLD AUTO: 0.8 %
KETONES URINE: NEGATIVE MG/DL
LEUKOCYTE ESTERASE URINE: NEGATIVE
LYMPHOCYTES # BLD AUTO: 0.54 K/UL
LYMPHOCYTES NFR BLD AUTO: 8.3 %
MAGNESIUM SERPL-MCNC: 1.7 MG/DL
MAN DIFF?: NORMAL
MCHC RBC-ENTMCNC: 32.5 PG
MCHC RBC-ENTMCNC: 32.7 G/DL
MCV RBC AUTO: 99.4 FL
MICROSCOPIC-UA: NORMAL
MONOCYTES # BLD AUTO: 0.47 K/UL
MONOCYTES NFR BLD AUTO: 7.2 %
NEUTROPHILS # BLD AUTO: 5.36 K/UL
NEUTROPHILS NFR BLD AUTO: 81.9 %
NITRITE URINE: NEGATIVE
PARATHYROID HORMONE INTACT: 107 PG/ML
PH URINE: 5.5
PHOSPHATE SERPL-MCNC: 3.8 MG/DL
PLATELET # BLD AUTO: 387 K/UL
POTASSIUM SERPL-SCNC: 4.7 MMOL/L
PROT SERPL-MCNC: 6.5 G/DL
PROT UR-MCNC: 29 MG/DL
PROTEIN URINE: NORMAL MG/DL
RBC # BLD: 3.26 M/UL
RBC # FLD: 17 %
RED BLOOD CELLS URINE: 1 /HPF
SODIUM SERPL-SCNC: 140 MMOL/L
SPECIFIC GRAVITY URINE: 1.02
TACROLIMUS SERPL-MCNC: 10.4 NG/ML
UROBILINOGEN URINE: 0.2 MG/DL
WBC # FLD AUTO: 6.54 K/UL
WHITE BLOOD CELLS URINE: 1 /HPF

## 2025-02-20 PROCEDURE — 99214 OFFICE O/P EST MOD 30 MIN: CPT

## 2025-02-25 LAB — BKV DNA SPEC QL NAA+PROBE: NOT DETECTED IU/ML

## 2025-03-03 ENCOUNTER — NON-APPOINTMENT (OUTPATIENT)
Age: 80
End: 2025-03-03

## 2025-03-03 ENCOUNTER — APPOINTMENT (OUTPATIENT)
Dept: NEPHROLOGY | Facility: CLINIC | Age: 80
End: 2025-03-03
Payer: MEDICARE

## 2025-03-03 VITALS
RESPIRATION RATE: 16 BRPM | OXYGEN SATURATION: 98 % | HEIGHT: 63 IN | TEMPERATURE: 98.2 F | DIASTOLIC BLOOD PRESSURE: 80 MMHG | SYSTOLIC BLOOD PRESSURE: 169 MMHG | HEART RATE: 102 BPM

## 2025-03-03 DIAGNOSIS — Z94.0 KIDNEY TRANSPLANT STATUS: ICD-10-CM

## 2025-03-03 LAB
ALBUMIN SERPL ELPH-MCNC: 4.3 G/DL
ALP BLD-CCNC: 64 U/L
ALT SERPL-CCNC: 6 U/L
ANION GAP SERPL CALC-SCNC: 14 MMOL/L
APPEARANCE: CLEAR
AST SERPL-CCNC: 9 U/L
BACTERIA: NEGATIVE /HPF
BASOPHILS # BLD AUTO: 0.04 K/UL
BASOPHILS NFR BLD AUTO: 0.6 %
BILIRUB SERPL-MCNC: <0.2 MG/DL
BILIRUBIN URINE: NEGATIVE
BLOOD URINE: NEGATIVE
BUN SERPL-MCNC: 28 MG/DL
CALCIUM SERPL-MCNC: 9.4 MG/DL
CAST: 0 /LPF
CHLORIDE SERPL-SCNC: 108 MMOL/L
CO2 SERPL-SCNC: 20 MMOL/L
COLOR: YELLOW
CREAT SERPL-MCNC: 1.16 MG/DL
CREAT SPEC-SCNC: 107 MG/DL
CREAT/PROT UR: 0.3 RATIO
EGFR: 63 ML/MIN/1.73M2
EOSINOPHIL # BLD AUTO: 0.12 K/UL
EOSINOPHIL NFR BLD AUTO: 1.9 %
EPITHELIAL CELLS: 0 /HPF
GLUCOSE QUALITATIVE U: NEGATIVE MG/DL
GLUCOSE SERPL-MCNC: 136 MG/DL
HCT VFR BLD CALC: 35.4 %
HGB BLD-MCNC: 11.8 G/DL
IMM GRANULOCYTES NFR BLD AUTO: 0.8 %
KETONES URINE: NEGATIVE MG/DL
LEUKOCYTE ESTERASE URINE: NEGATIVE
LYMPHOCYTES # BLD AUTO: 0.85 K/UL
LYMPHOCYTES NFR BLD AUTO: 13.7 %
MAGNESIUM SERPL-MCNC: 1.8 MG/DL
MAN DIFF?: NORMAL
MCHC RBC-ENTMCNC: 33 PG
MCHC RBC-ENTMCNC: 33.3 G/DL
MCV RBC AUTO: 98.9 FL
MICROSCOPIC-UA: NORMAL
MONOCYTES # BLD AUTO: 0.51 K/UL
MONOCYTES NFR BLD AUTO: 8.2 %
NEUTROPHILS # BLD AUTO: 4.64 K/UL
NEUTROPHILS NFR BLD AUTO: 74.8 %
NITRITE URINE: NEGATIVE
PH URINE: 5.5
PHOSPHATE SERPL-MCNC: 3.4 MG/DL
PLATELET # BLD AUTO: 393 K/UL
POTASSIUM SERPL-SCNC: 4.2 MMOL/L
PROT SERPL-MCNC: 6.6 G/DL
PROT UR-MCNC: 31 MG/DL
PROTEIN URINE: 30 MG/DL
RBC # BLD: 3.58 M/UL
RBC # FLD: 15.7 %
RED BLOOD CELLS URINE: 0 /HPF
SODIUM SERPL-SCNC: 142 MMOL/L
SPECIFIC GRAVITY URINE: 1.02
TACROLIMUS SERPL-MCNC: 6.8 NG/ML
UROBILINOGEN URINE: 0.2 MG/DL
WBC # FLD AUTO: 6.21 K/UL
WHITE BLOOD CELLS URINE: 1 /HPF

## 2025-03-03 PROCEDURE — 99214 OFFICE O/P EST MOD 30 MIN: CPT

## 2025-03-05 LAB — BKV DNA SPEC QL NAA+PROBE: NOT DETECTED IU/ML

## 2025-04-07 ENCOUNTER — APPOINTMENT (OUTPATIENT)
Dept: NEPHROLOGY | Facility: CLINIC | Age: 80
End: 2025-04-07
Payer: MEDICARE

## 2025-04-07 ENCOUNTER — NON-APPOINTMENT (OUTPATIENT)
Age: 80
End: 2025-04-07

## 2025-04-07 VITALS
SYSTOLIC BLOOD PRESSURE: 178 MMHG | RESPIRATION RATE: 16 BRPM | BODY MASS INDEX: 24.63 KG/M2 | DIASTOLIC BLOOD PRESSURE: 84 MMHG | HEIGHT: 63 IN | OXYGEN SATURATION: 98 % | HEART RATE: 93 BPM | TEMPERATURE: 98.2 F | WEIGHT: 139 LBS

## 2025-04-07 DIAGNOSIS — I10 ESSENTIAL (PRIMARY) HYPERTENSION: ICD-10-CM

## 2025-04-07 DIAGNOSIS — Z79.60 LONG TERM (CURRENT) USE OF UNSPECIFIED IMMUNOMODULATORS AND IMMUNOSUPPRESSANTS: ICD-10-CM

## 2025-04-07 DIAGNOSIS — Z94.0 KIDNEY TRANSPLANT STATUS: ICD-10-CM

## 2025-04-07 LAB
ALBUMIN SERPL ELPH-MCNC: 4.6 G/DL
ALP BLD-CCNC: 64 U/L
ALT SERPL-CCNC: 9 U/L
ANION GAP SERPL CALC-SCNC: 14 MMOL/L
APPEARANCE: CLEAR
AST SERPL-CCNC: 12 U/L
BACTERIA: NEGATIVE /HPF
BASOPHILS # BLD AUTO: 0.03 K/UL
BASOPHILS NFR BLD AUTO: 0.4 %
BILIRUB SERPL-MCNC: 0.2 MG/DL
BILIRUBIN URINE: NEGATIVE
BLOOD URINE: NEGATIVE
BUN SERPL-MCNC: 36 MG/DL
CALCIUM SERPL-MCNC: 9.7 MG/DL
CALCIUM SERPL-MCNC: 9.7 MG/DL
CAST: 3 /LPF
CHLORIDE SERPL-SCNC: 107 MMOL/L
CO2 SERPL-SCNC: 19 MMOL/L
COLOR: YELLOW
CREAT SERPL-MCNC: 1.72 MG/DL
CREAT SPEC-SCNC: 88 MG/DL
CREAT/PROT UR: 0.4 RATIO
EGFRCR SERPLBLD CKD-EPI 2021: 39 ML/MIN/1.73M2
EOSINOPHIL # BLD AUTO: 0.12 K/UL
EOSINOPHIL NFR BLD AUTO: 1.5 %
EPITHELIAL CELLS: 1 /HPF
GLUCOSE QUALITATIVE U: 100 MG/DL
GLUCOSE SERPL-MCNC: 113 MG/DL
HCT VFR BLD CALC: 37.3 %
HGB BLD-MCNC: 12.8 G/DL
IMM GRANULOCYTES NFR BLD AUTO: 0.5 %
KETONES URINE: NEGATIVE MG/DL
LEUKOCYTE ESTERASE URINE: NEGATIVE
LYMPHOCYTES # BLD AUTO: 0.82 K/UL
LYMPHOCYTES NFR BLD AUTO: 10.5 %
MAGNESIUM SERPL-MCNC: 1.9 MG/DL
MAN DIFF?: NORMAL
MCHC RBC-ENTMCNC: 33.2 PG
MCHC RBC-ENTMCNC: 34.3 G/DL
MCV RBC AUTO: 96.6 FL
MICROSCOPIC-UA: NORMAL
MONOCYTES # BLD AUTO: 0.56 K/UL
MONOCYTES NFR BLD AUTO: 7.2 %
NEUTROPHILS # BLD AUTO: 6.25 K/UL
NEUTROPHILS NFR BLD AUTO: 79.9 %
NITRITE URINE: NEGATIVE
PARATHYROID HORMONE INTACT: 132 PG/ML
PH URINE: 6
PHOSPHATE SERPL-MCNC: 3.5 MG/DL
PLATELET # BLD AUTO: 325 K/UL
POTASSIUM SERPL-SCNC: 4.4 MMOL/L
PROT SERPL-MCNC: 6.9 G/DL
PROT UR-MCNC: 31 MG/DL
PROTEIN URINE: 30 MG/DL
RBC # BLD: 3.86 M/UL
RBC # FLD: 13.7 %
RED BLOOD CELLS URINE: 0 /HPF
SODIUM SERPL-SCNC: 139 MMOL/L
SPECIFIC GRAVITY URINE: 1.01
TACROLIMUS SERPL-MCNC: 9.3 NG/ML
UROBILINOGEN URINE: 0.2 MG/DL
WBC # FLD AUTO: 7.82 K/UL
WHITE BLOOD CELLS URINE: 1 /HPF

## 2025-04-07 PROCEDURE — 99214 OFFICE O/P EST MOD 30 MIN: CPT

## 2025-04-10 LAB — BKV DNA SPEC QL NAA+PROBE: NOT DETECTED IU/ML

## 2025-04-14 ENCOUNTER — APPOINTMENT (OUTPATIENT)
Dept: TRANSPLANT | Facility: CLINIC | Age: 80
End: 2025-04-14

## 2025-04-15 LAB — TACROLIMUS SERPL-MCNC: 4.6 NG/ML

## 2025-04-16 ENCOUNTER — APPOINTMENT (OUTPATIENT)
Dept: DERMATOLOGY | Facility: CLINIC | Age: 80
End: 2025-04-16
Payer: MEDICARE

## 2025-04-16 PROCEDURE — 17003 DESTRUCT PREMALG LES 2-14: CPT | Mod: 59

## 2025-04-16 PROCEDURE — 99213 OFFICE O/P EST LOW 20 MIN: CPT | Mod: 25

## 2025-04-16 PROCEDURE — 17000 DESTRUCT PREMALG LESION: CPT

## 2025-04-22 ENCOUNTER — APPOINTMENT (OUTPATIENT)
Dept: NEPHROLOGY | Facility: CLINIC | Age: 80
End: 2025-04-22

## 2025-04-28 ENCOUNTER — APPOINTMENT (OUTPATIENT)
Dept: TRANSPLANT | Facility: CLINIC | Age: 80
End: 2025-04-28

## 2025-04-29 ENCOUNTER — NON-APPOINTMENT (OUTPATIENT)
Age: 80
End: 2025-04-29

## 2025-05-02 ENCOUNTER — OUTPATIENT (OUTPATIENT)
Dept: OUTPATIENT SERVICES | Facility: HOSPITAL | Age: 80
LOS: 1 days | End: 2025-05-02

## 2025-05-02 ENCOUNTER — INPATIENT (INPATIENT)
Facility: HOSPITAL | Age: 80
LOS: 4 days | Discharge: HOME | DRG: 700 | End: 2025-05-07
Attending: TRANSPLANT SURGERY | Admitting: TRANSPLANT SURGERY
Payer: MEDICARE

## 2025-05-02 ENCOUNTER — APPOINTMENT (OUTPATIENT)
Dept: TRANSPLANT | Facility: CLINIC | Age: 80
End: 2025-05-02

## 2025-05-02 ENCOUNTER — NON-APPOINTMENT (OUTPATIENT)
Age: 80
End: 2025-05-02

## 2025-05-02 ENCOUNTER — APPOINTMENT (OUTPATIENT)
Dept: ULTRASOUND IMAGING | Facility: HOSPITAL | Age: 80
End: 2025-05-02
Payer: MEDICARE

## 2025-05-02 VITALS
WEIGHT: 143.52 LBS | HEART RATE: 81 BPM | OXYGEN SATURATION: 99 % | DIASTOLIC BLOOD PRESSURE: 72 MMHG | HEIGHT: 66 IN | SYSTOLIC BLOOD PRESSURE: 154 MMHG | RESPIRATION RATE: 18 BRPM | TEMPERATURE: 98 F

## 2025-05-02 DIAGNOSIS — N17.9 ACUTE KIDNEY FAILURE, UNSPECIFIED: ICD-10-CM

## 2025-05-02 DIAGNOSIS — Z94.0 KIDNEY TRANSPLANT STATUS: ICD-10-CM

## 2025-05-02 DIAGNOSIS — Z94.0 KIDNEY TRANSPLANT STATUS: Chronic | ICD-10-CM

## 2025-05-02 LAB
ALBUMIN SERPL ELPH-MCNC: 5 G/DL
ANION GAP SERPL CALC-SCNC: 17 MMOL/L
BASOPHILS # BLD AUTO: 0.03 K/UL
BASOPHILS NFR BLD AUTO: 0.4 %
BUN SERPL-MCNC: 52 MG/DL
CALCIUM SERPL-MCNC: 9.8 MG/DL
CHLORIDE SERPL-SCNC: 108 MMOL/L
CO2 SERPL-SCNC: 15 MMOL/L
CREAT SERPL-MCNC: 3.06 MG/DL
EGFRCR SERPLBLD CKD-EPI 2021: 20 ML/MIN/1.73M2
EOSINOPHIL # BLD AUTO: 0.1 K/UL
EOSINOPHIL NFR BLD AUTO: 1.5 %
GLUCOSE SERPL-MCNC: 139 MG/DL
HCT VFR BLD CALC: 38.2 %
HGB BLD-MCNC: 12.8 G/DL
IMM GRANULOCYTES NFR BLD AUTO: 0.6 %
LYMPHOCYTES # BLD AUTO: 0.9 K/UL
LYMPHOCYTES NFR BLD AUTO: 13.1 %
MAN DIFF?: NORMAL
MCHC RBC-ENTMCNC: 32.2 PG
MCHC RBC-ENTMCNC: 33.5 G/DL
MCV RBC AUTO: 96.2 FL
MONOCYTES # BLD AUTO: 0.35 K/UL
MONOCYTES NFR BLD AUTO: 5.1 %
NEUTROPHILS # BLD AUTO: 5.46 K/UL
NEUTROPHILS NFR BLD AUTO: 79.3 %
PHOSPHATE SERPL-MCNC: 3.6 MG/DL
PLATELET # BLD AUTO: 312 K/UL
POTASSIUM SERPL-SCNC: 4.7 MMOL/L
RBC # BLD: 3.97 M/UL
RBC # FLD: 14 %
SODIUM SERPL-SCNC: 140 MMOL/L
WBC # FLD AUTO: 6.88 K/UL

## 2025-05-02 PROCEDURE — 99222 1ST HOSP IP/OBS MODERATE 55: CPT | Mod: GC

## 2025-05-02 PROCEDURE — 76776 US EXAM K TRANSPL W/DOPPLER: CPT | Mod: 26,RT

## 2025-05-02 RX ORDER — MELATONIN 5 MG
1 TABLET ORAL ONCE
Refills: 0 | Status: COMPLETED | OUTPATIENT
Start: 2025-05-02 | End: 2025-05-02

## 2025-05-02 RX ORDER — SULFAMETHOXAZOLE/TRIMETHOPRIM 800-160 MG
1 TABLET ORAL DAILY
Refills: 0 | Status: DISCONTINUED | OUTPATIENT
Start: 2025-05-03 | End: 2025-05-05

## 2025-05-02 RX ORDER — VALGANCICLOVIR 450 MG/1
450 TABLET, FILM COATED ORAL DAILY
Refills: 0 | Status: DISCONTINUED | OUTPATIENT
Start: 2025-05-03 | End: 2025-05-05

## 2025-05-02 RX ORDER — ROSUVASTATIN CALCIUM 20 MG/1
20 TABLET, FILM COATED ORAL AT BEDTIME
Refills: 0 | Status: DISCONTINUED | OUTPATIENT
Start: 2025-05-02 | End: 2025-05-07

## 2025-05-02 RX ORDER — LABETALOL HYDROCHLORIDE 200 MG/1
100 TABLET, FILM COATED ORAL THREE TIMES A DAY
Refills: 0 | Status: DISCONTINUED | OUTPATIENT
Start: 2025-05-02 | End: 2025-05-04

## 2025-05-02 RX ORDER — METHYLPREDNISOLONE ACETATE 80 MG/ML
500 INJECTION, SUSPENSION INTRA-ARTICULAR; INTRALESIONAL; INTRAMUSCULAR; SOFT TISSUE ONCE
Refills: 0 | Status: COMPLETED | OUTPATIENT
Start: 2025-05-02 | End: 2025-05-02

## 2025-05-02 RX ORDER — SENNA 187 MG
2 TABLET ORAL AT BEDTIME
Refills: 0 | Status: DISCONTINUED | OUTPATIENT
Start: 2025-05-02 | End: 2025-05-07

## 2025-05-02 RX ORDER — TAMSULOSIN HYDROCHLORIDE 0.4 MG/1
0.4 CAPSULE ORAL AT BEDTIME
Refills: 0 | Status: DISCONTINUED | OUTPATIENT
Start: 2025-05-02 | End: 2025-05-07

## 2025-05-02 RX ORDER — SODIUM BICARBONATE 1 MEQ/ML
650 SYRINGE (ML) INTRAVENOUS
Refills: 0 | Status: DISCONTINUED | OUTPATIENT
Start: 2025-05-02 | End: 2025-05-04

## 2025-05-02 RX ORDER — HEPARIN SODIUM 1000 [USP'U]/ML
5000 INJECTION INTRAVENOUS; SUBCUTANEOUS EVERY 12 HOURS
Refills: 0 | Status: DISCONTINUED | OUTPATIENT
Start: 2025-05-02 | End: 2025-05-04

## 2025-05-02 RX ORDER — MYCOPHENOLIC ACID 360 MG/1
180 TABLET, DELAYED RELEASE ORAL
Refills: 0 | Status: DISCONTINUED | OUTPATIENT
Start: 2025-05-02 | End: 2025-05-04

## 2025-05-02 RX ORDER — PREDNISONE 20 MG/1
2.5 TABLET ORAL DAILY
Refills: 0 | Status: DISCONTINUED | OUTPATIENT
Start: 2025-05-03 | End: 2025-05-03

## 2025-05-02 RX ORDER — AMLODIPINE BESYLATE 10 MG/1
10 TABLET ORAL DAILY
Refills: 0 | Status: DISCONTINUED | OUTPATIENT
Start: 2025-05-03 | End: 2025-05-06

## 2025-05-02 RX ADMIN — Medication 500000 UNIT(S): at 23:58

## 2025-05-02 RX ADMIN — MYCOPHENOLIC ACID 180 MILLIGRAM(S): 360 TABLET, DELAYED RELEASE ORAL at 20:53

## 2025-05-02 RX ADMIN — METHYLPREDNISOLONE ACETATE 100 MILLIGRAM(S): 80 INJECTION, SUSPENSION INTRA-ARTICULAR; INTRALESIONAL; INTRAMUSCULAR; SOFT TISSUE at 20:53

## 2025-05-02 RX ADMIN — Medication 2 TABLET(S): at 21:54

## 2025-05-02 RX ADMIN — Medication 1 MILLIGRAM(S): at 23:59

## 2025-05-02 RX ADMIN — LABETALOL HYDROCHLORIDE 100 MILLIGRAM(S): 200 TABLET, FILM COATED ORAL at 21:54

## 2025-05-02 RX ADMIN — TAMSULOSIN HYDROCHLORIDE 0.4 MILLIGRAM(S): 0.4 CAPSULE ORAL at 21:54

## 2025-05-02 RX ADMIN — ROSUVASTATIN CALCIUM 20 MILLIGRAM(S): 20 TABLET, FILM COATED ORAL at 21:54

## 2025-05-02 NOTE — CONSULT NOTE ADULT - ASSESSMENT
79 y.o. M w/ PMHx HTN, left inguinal hernia repair, HLD, 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 Transplant nephrology consulted for DDRT.    1. S/p DDRT 12/5/24  - Baseline Scr 1.2-1.7  - Elevated at 3.61 on outpatient labs  - Outpatient renal US with patent vasculature, complex cystic lesion on transplanted kidney, and enlarged prostate  - Consult IR for renal biopsy  - Hold ASA and blood thinners  - Check BK and CMV PCR  - Send DSA in AM  - Pulse with solumedrol 500mg x1 today  - Avoid any potential nephrotoxins when possible and dose medications per eGFR    2. IS  - Hold Env in setting of LISA  - Continue myfortic 180mg BID, prednisone 2.5mg qd  - Obtain FK level in AM    **Recommendations preliminary until attending attestation**  If you have any questions, please feel free to contact me  Oliver Cevallos  Nephrology Fellow  Page [phone] / [employer] Teams (Preferred)  (After 4pm or on weekends please page the on-call fellow)

## 2025-05-02 NOTE — H&P ADULT - NSICDXPASTMEDICALHX_GEN_ALL_CORE_FT
PAST MEDICAL HISTORY:  HLD (hyperlipidemia)     HTN (hypertension)     PKD (polycystic kidney disease)

## 2025-05-02 NOTE — CONSULT NOTE ADULT - TIME BILLING
Patient with DDRT (12/5/24) admitted with rising creatinine.  Reviewed immunosuppression, clinical, lab, imaging data  Comorbidities reviewed  Reviewed management regimen for comorbidities, reviewed out patient records, donor details  Suggestions  Steroid pulse as planned  Tac/MMF to continue  Check DSA  Case d/w transplant team/pr nephrologist  Will follow  I was present during and reviewed clinical and lab data as well as assessment and plan as documented by the house staff as noted. Please contact if any additional questions with any change in clinical condition or on availability of any additional information or reports.

## 2025-05-02 NOTE — H&P ADULT - HISTORY OF PRESENT ILLNESS
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 post op had episode of aflutter on tele w/ 1st degree av block, EKG normal cardiology recommended continuing amlodipine and labetalol. **********     79 male with PMHx HTN, left inguinal hernia repair, HLD, 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 post op had episode of aflutter on tele w/ 1st degree av block, EKG normal cardiology recommended continuing amlodipine and labetalol. Patient presents 5/2/25 as a direct admit, was sent from outpatient with an LISA Cr-3.06 in outpatient labs, baseline Cr of 1.2-1.7. Patient denies fevers, chills, N&V, dysuria, hematuria, trouble voiding, diarrhea, or constipation. He reports making a good amount of urine denies pain at surgical site.

## 2025-05-02 NOTE — PATIENT PROFILE ADULT - FALL HARM RISK - UNIVERSAL INTERVENTIONS
Bed in lowest position, wheels locked, appropriate side rails in place/Call bell, personal items and telephone in reach/Instruct patient to call for assistance before getting out of bed or chair/Non-slip footwear when patient is out of bed/[place] to call system/Physically safe environment - no spills, clutter or unnecessary equipment/Purposeful Proactive Rounding/Room/bathroom lighting operational, light cord in reach

## 2025-05-02 NOTE — H&P ADULT - ASSESSMENT
Plan:  Admit to transplant surgery    [] LISA/ S/P DDRT 12/5/24     Start bowel regimen  Po diet  Pt encouraged to ambulate       Immuno:     Envarsus per level, MMF___, Pred   PPX:      Daily labs         HTN (Hypertension).     Monitor bP.   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 post op had episode of aflutter on tele w/ 1st degree av block, EKG normal cardiology recommended continuing amlodipine and labetalol. Patient presents 5/2/25 as a direct admit, was sent from outpatient with an LISA Cr-3.06 in outpatient labs, baseline Cr of 1.2-1.7. Patient denies fevers, chills, N&V, dysuria, hematuria, trouble voiding, diarrhea, or constipation. He reports making a good amount of urine denies pain at surgical site.     Plan:  Admit to transplant surgery    [] LISA/ S/P DDRT 12/5/24  - Renal US: patent vasc. complex cystic lesion 1.7cm, enlarged prostate   - Start bowel regimen  - Po diet  - Pt encouraged to ambulate  - Pulse steroids 5/2 - Solumed 500mg  - Daily labs  - DSA sent  - IR c/s for biopsy monday    [] Immuno:     - Envarsus HELD, Myfortic 180 BID, Pred 2.5  - PPX: Valcyte, Bactrim, Pepcid    [] DVT PPx  - SQH BID      [] HTN (Hypertension).     - Monitor bP.   - Labetalol 100mg TID, Amlodipine 5mg qd

## 2025-05-02 NOTE — H&P ADULT - NSHPREVIEWOFSYSTEMS_GEN_ALL_CORE
REVIEW OF SYSTEMS  --------------------------------------------------------------------------------  Gen: No weight changes, fatigue, fevers/chills, weakness  Skin: No rashes  Head/Eyes/Ears/Mouth: No headache; Normal hearing; Normal vision w/o blurriness; No sinus pain/discomfort, sore throat  Respiratory: No dyspnea, cough, wheezing, hemoptysis  CV: No chest pain, PND, orthopnea  GI: No abdominal pain, diarrhea, constipation, nausea, vomiting, melena, hematochezia  : No increased frequency, dysuria, hematuria, nocturia  MSK: No joint pain/swelling; no back pain; no edema  Neuro: No dizziness/lightheadedness, weakness, seizures, numbness, tingling  Heme: No easy bruising or bleeding  Endo: No heat/cold intolerance  Psych: No significant nervousness, anxiety, stress, depression  All other systems were reviewed and are negative, except as noted.

## 2025-05-02 NOTE — PATIENT PROFILE ADULT - NSPROPTRIGHTCAREGIVER_GEN_A_NUR
59 yo male w/ history of DM II, PAD, p/w R 4th/5th digit dry gangrene w/ non healing R medial malleolar ulcer, no palpable pulses distal to femoral b/l.     R foot gangrene w/ pus  - podiatry and vascular following  - MRI not suggestive of acute osteomyelitis, but was a poor study due to motion artifact   - vascular study showed an unobtainable ankle brachial index w/ diffusely calcified noncompressible vessels limiting evaluation and diminished flow below the right knee  - US showed occluded right superficial femoral and popliteal arteries with multiple stents   - CTA showed occluded right superficial femoral and popliteal arteries containing multiple overlapping stents with reconstitution in the mid popliteal artery, three-vessel runoff into b/l feet, occluded left superficial femoral artery with reconstitution distally, moderate narrowing in the mid left popliteal artery and moderate narrowing in the proximal left external iliac artery with associated areas of ulcerated plaque  - abscess cultures grew Serratia, GBS and MSSA  - as per ID, will c/w Cefepime & Flagyl  - s/p TMA 2/15 - surgical culture and pathology are pending  - c/w morphine and percocet for pain    Hypophosphatemia     - replace w/ PO Phos    PAD  - US showed occluded right superficial femoral and popliteal arteries with multiple stents   - CTA showed occluded right superficial femoral and popliteal arteries containing multiple overlapping stents with reconstitution in the mid popliteal artery, three-vessel runoff into b/l feet, occluded left superficial femoral artery with reconstitution distally, moderate narrowing in the mid left popliteal artery and moderate narrowing in the proximal left external iliac artery with associated areas of ulcerated plaque  - as per Dr. Garcia, patient to be transferred to [facility] 2/16 for IR thrombectomy 2/17  - c/w ASA & Lipitor    A. fib on post TMA EKG per anaesthesia   - no more episodes of A. fib on tele today  - Echo showed EF 55-60% & moderate aortic stenosis  - as per cardio, would ideally start AC for A. fib but in light of recent GIB/coffee grounds emesis will hold off  - started metoprolol   - ASA resumed as per GI     Coffee ground emesis on 2/14  - c/w Protonix & carafate  - FOBT positive  - EGD on 2/18 showed distal moderate esophagitis s/p biopsy, proximal-mid esophageal whitish exudate s/p biopsy, mild antral gastritis s/p biopsy & mild duodenitis s/p biopsy   - H&H is stable    Hiccups  - c/w Protonix  - c/w gabapentin    Constipation  - c/w Senna  - add lactulose  - c/w PRN Miralax    DM II  - c/w ISS  - Hgb A1C is 8.6    Severe protein-calorie malnutrition  - nutrition consult     Smoker  - c/w Nicotine Patch    Prophylaxis:  DVT: subcutaneous Heparin on hold post TMA and due to possible GI bleed  GI: Protonix    Plan pending transfer to [facility] for IR thrombectomy on Monday or Tuesday. I called and gave sign out to Dr. Dotson at [facility] on 2/18.     NOK: Richa [phone]  no

## 2025-05-02 NOTE — H&P ADULT - NSHPPHYSICALEXAM_GEN_ALL_CORE
PHYSICAL EXAM:  Constitutional: Well developed / well nourished  Eyes: Anicteric, PERRLA  ENMT: nc/at  Neck: supple  Respiratory: CTA B/L  Cardiovascular: RRR  Gastrointestinal: Soft abdomen, no distention, bowel sounds 4 quadrents  Genitourinary: Voiding spontaneously  Extremities: No edema  Vascular: Palpable dp pulses bilaterally  Neurological: A&O x3  Skin: No rashes  Musculoskeletal: Moving all extremities  Psychiatric: Responsive

## 2025-05-03 LAB
ALBUMIN SERPL ELPH-MCNC: 4.5 G/DL — SIGNIFICANT CHANGE UP (ref 3.3–5)
ALP SERPL-CCNC: 55 U/L — SIGNIFICANT CHANGE UP (ref 40–120)
ALT FLD-CCNC: 10 U/L — SIGNIFICANT CHANGE UP (ref 10–45)
ANION GAP SERPL CALC-SCNC: 19 MMOL/L — HIGH (ref 5–17)
APPEARANCE UR: CLEAR — SIGNIFICANT CHANGE UP
AST SERPL-CCNC: 12 U/L — SIGNIFICANT CHANGE UP (ref 10–40)
BASOPHILS # BLD AUTO: 0.02 K/UL — SIGNIFICANT CHANGE UP (ref 0–0.2)
BASOPHILS NFR BLD AUTO: 0.3 % — SIGNIFICANT CHANGE UP (ref 0–2)
BILIRUB SERPL-MCNC: 0.3 MG/DL — SIGNIFICANT CHANGE UP (ref 0.2–1.2)
BILIRUB UR-MCNC: NEGATIVE — SIGNIFICANT CHANGE UP
BLD GP AB SCN SERPL QL: NEGATIVE — SIGNIFICANT CHANGE UP
BUN SERPL-MCNC: 54 MG/DL — HIGH (ref 7–23)
CALCIUM SERPL-MCNC: 9.4 MG/DL — SIGNIFICANT CHANGE UP (ref 8.4–10.5)
CHLORIDE SERPL-SCNC: 104 MMOL/L — SIGNIFICANT CHANGE UP (ref 96–108)
CO2 SERPL-SCNC: 13 MMOL/L — LOW (ref 22–31)
COLOR SPEC: YELLOW — SIGNIFICANT CHANGE UP
CREAT SERPL-MCNC: 2.84 MG/DL — HIGH (ref 0.5–1.3)
DIFF PNL FLD: NEGATIVE — SIGNIFICANT CHANGE UP
EGFR: 21 ML/MIN/1.73M2 — LOW
EGFR: 21 ML/MIN/1.73M2 — LOW
EOSINOPHIL # BLD AUTO: 0 K/UL — SIGNIFICANT CHANGE UP (ref 0–0.5)
EOSINOPHIL NFR BLD AUTO: 0 % — SIGNIFICANT CHANGE UP (ref 0–6)
GLUCOSE BLDC GLUCOMTR-MCNC: 237 MG/DL — HIGH (ref 70–99)
GLUCOSE BLDC GLUCOMTR-MCNC: 238 MG/DL — HIGH (ref 70–99)
GLUCOSE BLDC GLUCOMTR-MCNC: 316 MG/DL — HIGH (ref 70–99)
GLUCOSE BLDC GLUCOMTR-MCNC: 339 MG/DL — HIGH (ref 70–99)
GLUCOSE SERPL-MCNC: 201 MG/DL — HIGH (ref 70–99)
GLUCOSE UR QL: 100 MG/DL
HCT VFR BLD CALC: 37.4 % — LOW (ref 39–50)
HGB BLD-MCNC: 12.5 G/DL — LOW (ref 13–17)
IMM GRANULOCYTES NFR BLD AUTO: 1 % — HIGH (ref 0–0.9)
KETONES UR-MCNC: NEGATIVE MG/DL — SIGNIFICANT CHANGE UP
LEUKOCYTE ESTERASE UR-ACNC: NEGATIVE — SIGNIFICANT CHANGE UP
LYMPHOCYTES # BLD AUTO: 0.42 K/UL — LOW (ref 1–3.3)
LYMPHOCYTES # BLD AUTO: 6.1 % — LOW (ref 13–44)
MAGNESIUM SERPL-MCNC: 1.9 MG/DL — SIGNIFICANT CHANGE UP (ref 1.6–2.6)
MCHC RBC-ENTMCNC: 32.2 PG — SIGNIFICANT CHANGE UP (ref 27–34)
MCHC RBC-ENTMCNC: 33.4 G/DL — SIGNIFICANT CHANGE UP (ref 32–36)
MCV RBC AUTO: 96.4 FL — SIGNIFICANT CHANGE UP (ref 80–100)
MONOCYTES # BLD AUTO: 0.03 K/UL — SIGNIFICANT CHANGE UP (ref 0–0.9)
MONOCYTES NFR BLD AUTO: 0.4 % — LOW (ref 2–14)
NEUTROPHILS # BLD AUTO: 6.34 K/UL — SIGNIFICANT CHANGE UP (ref 1.8–7.4)
NEUTROPHILS NFR BLD AUTO: 92.2 % — HIGH (ref 43–77)
NITRITE UR-MCNC: NEGATIVE — SIGNIFICANT CHANGE UP
NRBC BLD AUTO-RTO: 0 /100 WBCS — SIGNIFICANT CHANGE UP (ref 0–0)
PH UR: 6 — SIGNIFICANT CHANGE UP (ref 5–8)
PHOSPHATE SERPL-MCNC: 3.9 MG/DL — SIGNIFICANT CHANGE UP (ref 2.5–4.5)
PLATELET # BLD AUTO: 313 K/UL — SIGNIFICANT CHANGE UP (ref 150–400)
POTASSIUM SERPL-MCNC: 4.4 MMOL/L — SIGNIFICANT CHANGE UP (ref 3.5–5.3)
POTASSIUM SERPL-SCNC: 4.4 MMOL/L — SIGNIFICANT CHANGE UP (ref 3.5–5.3)
PROT SERPL-MCNC: 7.1 G/DL — SIGNIFICANT CHANGE UP (ref 6–8.3)
PROT UR-MCNC: SIGNIFICANT CHANGE UP MG/DL
RBC # BLD: 3.88 M/UL — LOW (ref 4.2–5.8)
RBC # FLD: 13.3 % — SIGNIFICANT CHANGE UP (ref 10.3–14.5)
RH IG SCN BLD-IMP: POSITIVE — SIGNIFICANT CHANGE UP
SODIUM SERPL-SCNC: 136 MMOL/L — SIGNIFICANT CHANGE UP (ref 135–145)
SP GR SPEC: 1.01 — SIGNIFICANT CHANGE UP (ref 1–1.03)
TACROLIMUS SERPL-MCNC: 9.4 NG/ML — SIGNIFICANT CHANGE UP
UROBILINOGEN FLD QL: 0.2 MG/DL — SIGNIFICANT CHANGE UP (ref 0.2–1)
WBC # BLD: 6.88 K/UL — SIGNIFICANT CHANGE UP (ref 3.8–10.5)
WBC # FLD AUTO: 6.88 K/UL — SIGNIFICANT CHANGE UP (ref 3.8–10.5)

## 2025-05-03 PROCEDURE — 99232 SBSQ HOSP IP/OBS MODERATE 35: CPT

## 2025-05-03 RX ORDER — SODIUM CHLORIDE 9 G/1000ML
1000 INJECTION, SOLUTION INTRAVENOUS
Refills: 0 | Status: DISCONTINUED | OUTPATIENT
Start: 2025-05-03 | End: 2025-05-07

## 2025-05-03 RX ORDER — GLUCAGON 3 MG/1
1 POWDER NASAL ONCE
Refills: 0 | Status: DISCONTINUED | OUTPATIENT
Start: 2025-05-03 | End: 2025-05-07

## 2025-05-03 RX ORDER — ACETAMINOPHEN 500 MG/5ML
650 LIQUID (ML) ORAL ONCE
Refills: 0 | Status: COMPLETED | OUTPATIENT
Start: 2025-05-03 | End: 2025-05-03

## 2025-05-03 RX ORDER — DEXTROSE 50 % IN WATER 50 %
25 SYRINGE (ML) INTRAVENOUS ONCE
Refills: 0 | Status: DISCONTINUED | OUTPATIENT
Start: 2025-05-03 | End: 2025-05-07

## 2025-05-03 RX ORDER — TACROLIMUS 0.5 MG/1
2 CAPSULE ORAL ONCE
Refills: 0 | Status: COMPLETED | OUTPATIENT
Start: 2025-05-03 | End: 2025-05-03

## 2025-05-03 RX ORDER — DEXTROSE 50 % IN WATER 50 %
12.5 SYRINGE (ML) INTRAVENOUS ONCE
Refills: 0 | Status: DISCONTINUED | OUTPATIENT
Start: 2025-05-03 | End: 2025-05-07

## 2025-05-03 RX ORDER — INSULIN GLARGINE-YFGN 100 [IU]/ML
3 INJECTION, SOLUTION SUBCUTANEOUS AT BEDTIME
Refills: 0 | Status: DISCONTINUED | OUTPATIENT
Start: 2025-05-03 | End: 2025-05-07

## 2025-05-03 RX ORDER — DEXTROSE 50 % IN WATER 50 %
15 SYRINGE (ML) INTRAVENOUS ONCE
Refills: 0 | Status: DISCONTINUED | OUTPATIENT
Start: 2025-05-03 | End: 2025-05-07

## 2025-05-03 RX ORDER — TACROLIMUS 0.5 MG/1
2 CAPSULE ORAL
Refills: 0 | Status: DISCONTINUED | OUTPATIENT
Start: 2025-05-04 | End: 2025-05-07

## 2025-05-03 RX ORDER — METHYLPREDNISOLONE ACETATE 80 MG/ML
250 INJECTION, SUSPENSION INTRA-ARTICULAR; INTRALESIONAL; INTRAMUSCULAR; SOFT TISSUE ONCE
Refills: 0 | Status: COMPLETED | OUTPATIENT
Start: 2025-05-03 | End: 2025-05-03

## 2025-05-03 RX ORDER — MELATONIN 5 MG
5 TABLET ORAL AT BEDTIME
Refills: 0 | Status: DISCONTINUED | OUTPATIENT
Start: 2025-05-03 | End: 2025-05-07

## 2025-05-03 RX ORDER — INSULIN LISPRO 100 U/ML
INJECTION, SOLUTION INTRAVENOUS; SUBCUTANEOUS
Refills: 0 | Status: DISCONTINUED | OUTPATIENT
Start: 2025-05-03 | End: 2025-05-07

## 2025-05-03 RX ADMIN — Medication 650 MILLIGRAM(S): at 17:27

## 2025-05-03 RX ADMIN — LABETALOL HYDROCHLORIDE 100 MILLIGRAM(S): 200 TABLET, FILM COATED ORAL at 05:25

## 2025-05-03 RX ADMIN — LABETALOL HYDROCHLORIDE 100 MILLIGRAM(S): 200 TABLET, FILM COATED ORAL at 13:07

## 2025-05-03 RX ADMIN — INSULIN LISPRO 8: 100 INJECTION, SOLUTION INTRAVENOUS; SUBCUTANEOUS at 11:53

## 2025-05-03 RX ADMIN — Medication 300 MILLIGRAM(S): at 11:52

## 2025-05-03 RX ADMIN — Medication 650 MILLIGRAM(S): at 05:26

## 2025-05-03 RX ADMIN — Medication 650 MILLIGRAM(S): at 02:20

## 2025-05-03 RX ADMIN — Medication 650 MILLIGRAM(S): at 03:20

## 2025-05-03 RX ADMIN — Medication 1 TABLET(S): at 11:53

## 2025-05-03 RX ADMIN — TAMSULOSIN HYDROCHLORIDE 0.4 MILLIGRAM(S): 0.4 CAPSULE ORAL at 20:50

## 2025-05-03 RX ADMIN — Medication 500000 UNIT(S): at 17:27

## 2025-05-03 RX ADMIN — VALGANCICLOVIR 450 MILLIGRAM(S): 450 TABLET, FILM COATED ORAL at 11:52

## 2025-05-03 RX ADMIN — Medication 5 MILLIGRAM(S): at 20:48

## 2025-05-03 RX ADMIN — AMLODIPINE BESYLATE 10 MILLIGRAM(S): 10 TABLET ORAL at 05:25

## 2025-05-03 RX ADMIN — Medication 2 TABLET(S): at 20:49

## 2025-05-03 RX ADMIN — METHYLPREDNISOLONE ACETATE 100 MILLIGRAM(S): 80 INJECTION, SUSPENSION INTRA-ARTICULAR; INTRALESIONAL; INTRAMUSCULAR; SOFT TISSUE at 11:53

## 2025-05-03 RX ADMIN — MYCOPHENOLIC ACID 180 MILLIGRAM(S): 360 TABLET, DELAYED RELEASE ORAL at 09:36

## 2025-05-03 RX ADMIN — Medication 500000 UNIT(S): at 11:52

## 2025-05-03 RX ADMIN — Medication 20 MILLIGRAM(S): at 11:54

## 2025-05-03 RX ADMIN — ROSUVASTATIN CALCIUM 20 MILLIGRAM(S): 20 TABLET, FILM COATED ORAL at 20:49

## 2025-05-03 RX ADMIN — Medication 650 MILLIGRAM(S): at 18:42

## 2025-05-03 RX ADMIN — PREDNISONE 2.5 MILLIGRAM(S): 20 TABLET ORAL at 05:26

## 2025-05-03 RX ADMIN — INSULIN GLARGINE-YFGN 3 UNIT(S): 100 INJECTION, SOLUTION SUBCUTANEOUS at 20:49

## 2025-05-03 RX ADMIN — HEPARIN SODIUM 5000 UNIT(S): 1000 INJECTION INTRAVENOUS; SUBCUTANEOUS at 05:25

## 2025-05-03 RX ADMIN — TACROLIMUS 2 MILLIGRAM(S): 0.5 CAPSULE ORAL at 15:06

## 2025-05-03 RX ADMIN — LABETALOL HYDROCHLORIDE 100 MILLIGRAM(S): 200 TABLET, FILM COATED ORAL at 20:50

## 2025-05-03 RX ADMIN — Medication 500000 UNIT(S): at 05:26

## 2025-05-03 RX ADMIN — INSULIN LISPRO 4: 100 INJECTION, SOLUTION INTRAVENOUS; SUBCUTANEOUS at 17:27

## 2025-05-03 RX ADMIN — MYCOPHENOLIC ACID 180 MILLIGRAM(S): 360 TABLET, DELAYED RELEASE ORAL at 20:48

## 2025-05-03 NOTE — PROGRESS NOTE ADULT - ASSESSMENT
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 post op had episode of aflutter on tele w/ 1st degree av block, EKG normal cardiology recommended continuing amlodipine and labetalol. Patient presents 5/2/25 as a direct admit, was sent from outpatient with an LISA Cr-3.06 in outpatient labs, baseline Cr of 1.2-1.7    [] LISA/ S/P DDRT 12/5/24  - Renal US: patent vasc. complex cystic lesion 1.7cm, enlarged prostate   - bowel regimen  - Pulse steroids 5/2 - Solumed 500mg, 5/3 - Solumed 250mg  - DSA sent  - IR c/s for biopsy monday    [] Immuno:     - Envarsus 2, Myfortic 180 BID, Pred 2.5  - PPX: Valcyte, Bactrim, Pepcid    [] DVT PPx  - SQH BID    [] HTN (Hypertension).     - Monitor bP.   - Labetalol 100mg TID, Amlodipine 5mg qd

## 2025-05-03 NOTE — PROGRESS NOTE ADULT - SUBJECTIVE AND OBJECTIVE BOX
Transplant Surgery - Multidisciplinary Rounds  --------------------------------------------------------------   Tx Date:  DDRT 12/5/2024    Present: Patient seen and examined with multidisciplinary Transplant team including Surgeon, Hepatologist, Surgical/Hepatology fellow, ACP,  Pharmacist, Nutritionist,  and bedside RN during AM rounds.   Disciplines not in attendance will be notified of the plan.     HPI:     Interval Events:      Immunosupression:  Induction:  Thymo  Maintenance: FK/MMF/SST  Ongoing monitoring for signs of rejection     Potential Discharge date: TBD  Education:  Medications  Plan of care:  See Below    MEDICATIONS  (STANDING):  allopurinol 300 milliGRAM(s) Oral daily  amLODIPine   Tablet 10 milliGRAM(s) Oral daily  famotidine    Tablet 20 milliGRAM(s) Oral daily  heparin   Injectable 5000 Unit(s) SubCutaneous every 12 hours  labetalol 100 milliGRAM(s) Oral three times a day  mycophenolic acid  milliGRAM(s) Oral <User Schedule>  nystatin    Suspension 883929 Unit(s) Oral four times a day  predniSONE   Tablet 2.5 milliGRAM(s) Oral daily  rosuvastatin 20 milliGRAM(s) Oral at bedtime  senna 2 Tablet(s) Oral at bedtime  sodium bicarbonate 650 milliGRAM(s) Oral two times a day  tamsulosin 0.4 milliGRAM(s) Oral at bedtime  trimethoprim   80 mG/sulfamethoxazole 400 mG 1 Tablet(s) Oral daily  valGANciclovir 450 milliGRAM(s) Oral daily    MEDICATIONS  (PRN):      PAST MEDICAL & SURGICAL HISTORY:  HTN (hypertension)      PKD (polycystic kidney disease)      HLD (hyperlipidemia)      S/p cadaver renal transplant          Vital Signs Last 24 Hrs  T(C): 36.5 (03 May 2025 05:24), Max: 36.7 (02 May 2025 18:12)  T(F): 97.7 (03 May 2025 05:24), Max: 98.1 (02 May 2025 18:12)  HR: 87 (03 May 2025 05:24) (81 - 87)  BP: 140/68 (03 May 2025 05:24) (140/68 - 155/70)  BP(mean): --  RR: 18 (03 May 2025 05:24) (18 - 18)  SpO2: 96% (03 May 2025 05:24) (96% - 99%)    Parameters below as of 03 May 2025 05:24  Patient On (Oxygen Delivery Method): room air        I&O's Summary    02 May 2025 07:01  -  03 May 2025 06:07  --------------------------------------------------------  IN: 150 mL / OUT: 775 mL / NET: -625 mL                        Review of systems  Gen: No weight changes, fatigue, fevers/chills, weakness  Skin: No rashes  Head/Eyes/Ears/Mouth: No headache; Normal hearing; Normal vision w/o blurriness; No sinus pain/discomfort, sore throat  Respiratory: No dyspnea, cough, wheezing, hemoptysis  CV: No chest pain, PND, orthopnea  GI: Mild abdominal pain at surgical incision site; denies diarrhea, constipation, nausea, vomiting, melena, hematochezia  : No increased frequency, dysuria, hematuria, nocturia  MSK: No joint pain/swelling; no back pain; no edema  Neuro: No dizziness/lightheadedness, weakness, seizures, numbness, tingling  Heme: No easy bruising or bleeding  Endo: No heat/cold intolerance  Psych: No significant nervousness, anxiety, stress, depression  All other systems were reviewed and are negative, except as noted.      PHYSICAL EXAM:  Constitutional: Well developed / well nourished  Eyes: Anicteric, PERRLA  ENMT: nc/at  Neck: central line *****************  Respiratory: CTA B/L  Cardiovascular: RRR  Gastrointestinal: Soft, non distended, mild tenderness at the incision site; incision c/d/i; SARITHA .....   Genitourinary: Urinary catheter in place*****Voiding spontaneously  Extremities: SCD's in place and working bilaterally, AVF.....  Vascular: Palpable dp pulses bilaterally  Neurological: A&O x3  Skin: no rashes, ulcerations or lesions;  Musculoskeletal: Moving all extremities  Psychiatric: Responsive     Transplant Surgery - Multidisciplinary Rounds  --------------------------------------------------------------   Tx Date:  DDRT 12/5/2024    Present: Patient seen and examined with multidisciplinary Transplant team including Surgeon, Hepatologist, Surgical/Hepatology fellow, ACP,  Pharmacist, Nutritionist,  and bedside RN during AM rounds.   Disciplines not in attendance will be notified of the plan.     HPI: 79 male with PMHx HTN, left inguinal hernia repair, HLD, 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 post op had episode of aflutter on tele w/ 1st degree av block, EKG normal cardiology recommended continuing amlodipine and labetalol. Patient presents 5/2/25 as a direct admit, was sent from outpatient with an LISA Cr-3.06 in outpatient labs, baseline Cr of 1.2-1.7    Interval Events:  - Afebrile, VSS  - Solumed 500 x1  - c/o Headache ON -> Tylenol x1    Immunosupression:  Maintenance: FK per level/Myfortic 180 BID/Steroid Pulse  Ongoing monitoring for signs of rejection     Potential Discharge date: TBD  Education:  Medications  Plan of care:  See Below        MEDICATIONS  (STANDING):  allopurinol 300 milliGRAM(s) Oral daily  amLODIPine   Tablet 10 milliGRAM(s) Oral daily  dextrose 5%. 1000 milliLiter(s) (50 mL/Hr) IV Continuous <Continuous>  dextrose 5%. 1000 milliLiter(s) (100 mL/Hr) IV Continuous <Continuous>  dextrose 50% Injectable 25 Gram(s) IV Push once  dextrose 50% Injectable 12.5 Gram(s) IV Push once  dextrose 50% Injectable 25 Gram(s) IV Push once  famotidine    Tablet 20 milliGRAM(s) Oral daily  glucagon  Injectable 1 milliGRAM(s) IntraMuscular once  heparin   Injectable 5000 Unit(s) SubCutaneous every 12 hours  insulin lispro (ADMELOG) corrective regimen sliding scale   SubCutaneous three times a day before meals  labetalol 100 milliGRAM(s) Oral three times a day  mycophenolic acid  milliGRAM(s) Oral <User Schedule>  nystatin    Suspension 407360 Unit(s) Oral four times a day  rosuvastatin 20 milliGRAM(s) Oral at bedtime  senna 2 Tablet(s) Oral at bedtime  sodium bicarbonate 650 milliGRAM(s) Oral two times a day  tamsulosin 0.4 milliGRAM(s) Oral at bedtime  trimethoprim   80 mG/sulfamethoxazole 400 mG 1 Tablet(s) Oral daily  valGANciclovir 450 milliGRAM(s) Oral daily    MEDICATIONS  (PRN):  dextrose Oral Gel 15 Gram(s) Oral once PRN Blood Glucose LESS THAN 70 milliGRAM(s)/deciliter      PAST MEDICAL & SURGICAL HISTORY:  HTN (hypertension)      PKD (polycystic kidney disease)      HLD (hyperlipidemia)      S/p cadaver renal transplant          Vital Signs Last 24 Hrs  T(C): 36.6 (03 May 2025 13:00), Max: 36.7 (02 May 2025 18:12)  T(F): 97.8 (03 May 2025 13:00), Max: 98.1 (02 May 2025 18:12)  HR: 96 (03 May 2025 13:00) (81 - 96)  BP: 135/70 (03 May 2025 13:00) (135/70 - 155/70)  BP(mean): --  RR: 18 (03 May 2025 13:00) (18 - 18)  SpO2: 97% (03 May 2025 13:00) (96% - 99%)    Parameters below as of 03 May 2025 13:00  Patient On (Oxygen Delivery Method): room air        I&O's Summary    02 May 2025 07:01  -  03 May 2025 07:00  --------------------------------------------------------  IN: 250 mL / OUT: 775 mL / NET: -525 mL    03 May 2025 07:01  -  03 May 2025 16:19  --------------------------------------------------------  IN: 420 mL / OUT: 550 mL / NET: -130 mL                              12.5   6.88  )-----------( 313      ( 03 May 2025 07:11 )             37.4     05-03    136  |  104  |  54[H]  ----------------------------<  201[H]  4.4   |  13[L]  |  2.84[H]    Ca    9.4      03 May 2025 07:13  Phos  3.9     05-03  Mg     1.9     05-03    TPro  7.1  /  Alb  4.5  /  TBili  0.3  /  DBili  x   /  AST  12  /  ALT  10  /  AlkPhos  55  05-03    Tacrolimus (), Serum: 9.4 ng/mL (05-03 @ 07:11)                    Review of systems  Gen: No weight changes, fatigue, fevers/chills, weakness  Skin: No rashes  Head/Eyes/Ears/Mouth: No headache; Normal hearing; Normal vision w/o blurriness; No sinus pain/discomfort, sore throat  Respiratory: No dyspnea, cough, wheezing, hemoptysis  CV: No chest pain, PND, orthopnea  GI:  denies diarrhea, constipation, nausea, vomiting, melena, hematochezia  : No increased frequency, dysuria, hematuria, nocturia  MSK: No joint pain/swelling; no back pain; no edema  Neuro: No dizziness/lightheadedness, weakness, seizures, numbness, tingling  Heme: No easy bruising or bleeding  Endo: No heat/cold intolerance  Psych: No significant nervousness, anxiety, stress, depression  All other systems were reviewed and are negative, except as noted.      PHYSICAL EXAM:  Constitutional: Well developed / well nourished  Eyes: Anicteric, PERRLA  ENMT: nc/at  Neck: supple  Respiratory: CTA B/L  Cardiovascular: RRR  Gastrointestinal: Soft, non distended, well healed scar  Genitourinary: Voiding spontaneously  Extremities: SCD's in place and working bilaterally  Vascular: Palpable dp pulses bilaterally  Neurological: A&O x3  Skin: no rashes, ulcerations or lesions;  Musculoskeletal: Moving all extremities  Psychiatric: Responsive

## 2025-05-03 NOTE — CHART NOTE - NSCHARTNOTEFT_GEN_A_CORE
Patient asked to discuss telemetry with provider. Explained purpose of telemetry and importance of cardiac monitoring, patient verbalized understanding as well as risks of refusing bedtime telemetry. Patient expressed he wishes to remain off telemetry and monitoring for PM and accepts any risks associated.

## 2025-05-03 NOTE — PROGRESS NOTE ADULT - SUBJECTIVE AND OBJECTIVE BOX
Patient is admitted with LISA, possible Acute rejection, on empirical steroids    S: Feel ok, no new/acute symptoms    MEDICATIONS  (STANDING):  allopurinol 300 milliGRAM(s) Oral daily  amLODIPine   Tablet 10 milliGRAM(s) Oral daily  dextrose 5%. 1000 milliLiter(s) (100 mL/Hr) IV Continuous <Continuous>  dextrose 5%. 1000 milliLiter(s) (50 mL/Hr) IV Continuous <Continuous>  dextrose 50% Injectable 25 Gram(s) IV Push once  dextrose 50% Injectable 12.5 Gram(s) IV Push once  dextrose 50% Injectable 25 Gram(s) IV Push once  famotidine    Tablet 20 milliGRAM(s) Oral daily  glucagon  Injectable 1 milliGRAM(s) IntraMuscular once  heparin   Injectable 5000 Unit(s) SubCutaneous every 12 hours  insulin glargine Injectable (LANTUS) 3 Unit(s) SubCutaneous at bedtime  insulin lispro (ADMELOG) corrective regimen sliding scale   SubCutaneous three times a day before meals  labetalol 100 milliGRAM(s) Oral three times a day  melatonin 5 milliGRAM(s) Oral at bedtime  mycophenolic acid  milliGRAM(s) Oral <User Schedule>  nystatin    Suspension 298326 Unit(s) Oral four times a day  rosuvastatin 20 milliGRAM(s) Oral at bedtime  senna 2 Tablet(s) Oral at bedtime  sodium bicarbonate 650 milliGRAM(s) Oral two times a day  tacrolimus ER Tablet (ENVARSUS XR) 2 milliGRAM(s) Oral <User Schedule>  tamsulosin 0.4 milliGRAM(s) Oral at bedtime  trimethoprim   80 mG/sulfamethoxazole 400 mG 1 Tablet(s) Oral daily  valGANciclovir 450 milliGRAM(s) Oral daily    MEDICATIONS  (PRN):  dextrose Oral Gel 15 Gram(s) Oral once PRN Blood Glucose LESS THAN 70 milliGRAM(s)/deciliter      VS noted    On exam:  No acute distress   chect/cv: no acute signs  Abd: Soft, non tender  Ext: no acute signs      LABS/STUDIES  --------------------------------------------------------------------------------              12.5   6.88  >-----------<  313      [05-03-25 @ 07:11]              37.4     136  |  104  |  54  ----------------------------<  201      [05-03-25 @ 07:13]  4.4   |  13  |  2.84        Ca     9.4     [05-03-25 @ 07:13]      Mg     1.9     [05-03-25 @ 07:13]      Phos  3.9     [05-03-25 @ 07:13]    TPro  7.1  /  Alb  4.5  /  TBili  0.3  /  DBili  x   /  AST  12  /  ALT  10  /  AlkPhos  55  [05-03-25 @ 07:13]          Creatinine Trend:  SCr 2.84 [05-03 @ 07:13]    Tacrolimus (), Serum: 9.4 ng/mL (05-03 @ 07:11)

## 2025-05-04 DIAGNOSIS — I10 ESSENTIAL (PRIMARY) HYPERTENSION: ICD-10-CM

## 2025-05-04 DIAGNOSIS — Z94.0 KIDNEY TRANSPLANT STATUS: ICD-10-CM

## 2025-05-04 DIAGNOSIS — N17.9 ACUTE KIDNEY FAILURE, UNSPECIFIED: ICD-10-CM

## 2025-05-04 DIAGNOSIS — D84.9 IMMUNODEFICIENCY, UNSPECIFIED: ICD-10-CM

## 2025-05-04 LAB
A1C WITH ESTIMATED AVERAGE GLUCOSE RESULT: 6.1 % — HIGH (ref 4–5.6)
ALBUMIN SERPL ELPH-MCNC: 4.4 G/DL — SIGNIFICANT CHANGE UP (ref 3.3–5)
ALP SERPL-CCNC: 49 U/L — SIGNIFICANT CHANGE UP (ref 40–120)
ALT FLD-CCNC: 8 U/L — LOW (ref 10–45)
ANION GAP SERPL CALC-SCNC: 18 MMOL/L — HIGH (ref 5–17)
AST SERPL-CCNC: 10 U/L — SIGNIFICANT CHANGE UP (ref 10–40)
BASOPHILS # BLD AUTO: 0.02 K/UL — SIGNIFICANT CHANGE UP (ref 0–0.2)
BASOPHILS NFR BLD AUTO: 0.1 % — SIGNIFICANT CHANGE UP (ref 0–2)
BILIRUB SERPL-MCNC: 0.2 MG/DL — SIGNIFICANT CHANGE UP (ref 0.2–1.2)
BUN SERPL-MCNC: 71 MG/DL — HIGH (ref 7–23)
CALCIUM SERPL-MCNC: 9.4 MG/DL — SIGNIFICANT CHANGE UP (ref 8.4–10.5)
CHLORIDE SERPL-SCNC: 103 MMOL/L — SIGNIFICANT CHANGE UP (ref 96–108)
CO2 SERPL-SCNC: 13 MMOL/L — LOW (ref 22–31)
CREAT SERPL-MCNC: 3.42 MG/DL — HIGH (ref 0.5–1.3)
CULTURE RESULTS: SIGNIFICANT CHANGE UP
EGFR: 17 ML/MIN/1.73M2 — LOW
EGFR: 17 ML/MIN/1.73M2 — LOW
EOSINOPHIL # BLD AUTO: 0 K/UL — SIGNIFICANT CHANGE UP (ref 0–0.5)
EOSINOPHIL NFR BLD AUTO: 0 % — SIGNIFICANT CHANGE UP (ref 0–6)
ESTIMATED AVERAGE GLUCOSE: 128 MG/DL — HIGH (ref 68–114)
GLUCOSE BLDC GLUCOMTR-MCNC: 154 MG/DL — HIGH (ref 70–99)
GLUCOSE BLDC GLUCOMTR-MCNC: 223 MG/DL — HIGH (ref 70–99)
GLUCOSE BLDC GLUCOMTR-MCNC: 226 MG/DL — HIGH (ref 70–99)
GLUCOSE BLDC GLUCOMTR-MCNC: 250 MG/DL — HIGH (ref 70–99)
GLUCOSE SERPL-MCNC: 200 MG/DL — HIGH (ref 70–99)
HCT VFR BLD CALC: 34.6 % — LOW (ref 39–50)
HGB BLD-MCNC: 11.9 G/DL — LOW (ref 13–17)
IMM GRANULOCYTES NFR BLD AUTO: 1.3 % — HIGH (ref 0–0.9)
LYMPHOCYTES # BLD AUTO: 0.55 K/UL — LOW (ref 1–3.3)
LYMPHOCYTES # BLD AUTO: 3 % — LOW (ref 13–44)
MAGNESIUM SERPL-MCNC: 2.1 MG/DL — SIGNIFICANT CHANGE UP (ref 1.6–2.6)
MCHC RBC-ENTMCNC: 33.1 PG — SIGNIFICANT CHANGE UP (ref 27–34)
MCHC RBC-ENTMCNC: 34.4 G/DL — SIGNIFICANT CHANGE UP (ref 32–36)
MCV RBC AUTO: 96.4 FL — SIGNIFICANT CHANGE UP (ref 80–100)
MONOCYTES # BLD AUTO: 0.39 K/UL — SIGNIFICANT CHANGE UP (ref 0–0.9)
MONOCYTES NFR BLD AUTO: 2.1 % — SIGNIFICANT CHANGE UP (ref 2–14)
NEUTROPHILS # BLD AUTO: 17.29 K/UL — HIGH (ref 1.8–7.4)
NEUTROPHILS NFR BLD AUTO: 93.5 % — HIGH (ref 43–77)
NRBC BLD AUTO-RTO: 0 /100 WBCS — SIGNIFICANT CHANGE UP (ref 0–0)
PHOSPHATE SERPL-MCNC: 4.6 MG/DL — HIGH (ref 2.5–4.5)
PLATELET # BLD AUTO: 302 K/UL — SIGNIFICANT CHANGE UP (ref 150–400)
POTASSIUM SERPL-MCNC: 4.2 MMOL/L — SIGNIFICANT CHANGE UP (ref 3.5–5.3)
POTASSIUM SERPL-SCNC: 4.2 MMOL/L — SIGNIFICANT CHANGE UP (ref 3.5–5.3)
PROT SERPL-MCNC: 6.9 G/DL — SIGNIFICANT CHANGE UP (ref 6–8.3)
RBC # BLD: 3.59 M/UL — LOW (ref 4.2–5.8)
RBC # FLD: 13.5 % — SIGNIFICANT CHANGE UP (ref 10.3–14.5)
SODIUM SERPL-SCNC: 134 MMOL/L — LOW (ref 135–145)
SPECIMEN SOURCE: SIGNIFICANT CHANGE UP
TACROLIMUS SERPL-MCNC: 7.4 NG/ML — SIGNIFICANT CHANGE UP
WBC # BLD: 18.49 K/UL — HIGH (ref 3.8–10.5)
WBC # FLD AUTO: 18.49 K/UL — HIGH (ref 3.8–10.5)

## 2025-05-04 PROCEDURE — 99232 SBSQ HOSP IP/OBS MODERATE 35: CPT

## 2025-05-04 RX ORDER — LABETALOL HYDROCHLORIDE 200 MG/1
200 TABLET, FILM COATED ORAL THREE TIMES A DAY
Refills: 0 | Status: DISCONTINUED | OUTPATIENT
Start: 2025-05-04 | End: 2025-05-04

## 2025-05-04 RX ORDER — SODIUM BICARBONATE 1 MEQ/ML
1300 SYRINGE (ML) INTRAVENOUS
Refills: 0 | Status: DISCONTINUED | OUTPATIENT
Start: 2025-05-04 | End: 2025-05-05

## 2025-05-04 RX ORDER — LABETALOL HYDROCHLORIDE 200 MG/1
100 TABLET, FILM COATED ORAL ONCE
Refills: 0 | Status: COMPLETED | OUTPATIENT
Start: 2025-05-04 | End: 2025-05-04

## 2025-05-04 RX ORDER — LABETALOL HYDROCHLORIDE 200 MG/1
100 TABLET, FILM COATED ORAL THREE TIMES A DAY
Refills: 0 | Status: DISCONTINUED | OUTPATIENT
Start: 2025-05-04 | End: 2025-05-07

## 2025-05-04 RX ORDER — ACETAMINOPHEN 500 MG/5ML
650 LIQUID (ML) ORAL ONCE
Refills: 0 | Status: COMPLETED | OUTPATIENT
Start: 2025-05-04 | End: 2025-05-04

## 2025-05-04 RX ORDER — MYCOPHENOLIC ACID 360 MG/1
360 TABLET, DELAYED RELEASE ORAL
Refills: 0 | Status: DISCONTINUED | OUTPATIENT
Start: 2025-05-04 | End: 2025-05-07

## 2025-05-04 RX ORDER — METHYLPREDNISOLONE ACETATE 80 MG/ML
250 INJECTION, SUSPENSION INTRA-ARTICULAR; INTRALESIONAL; INTRAMUSCULAR; SOFT TISSUE ONCE
Refills: 0 | Status: COMPLETED | OUTPATIENT
Start: 2025-05-04 | End: 2025-05-04

## 2025-05-04 RX ADMIN — INSULIN LISPRO 2: 100 INJECTION, SOLUTION INTRAVENOUS; SUBCUTANEOUS at 12:27

## 2025-05-04 RX ADMIN — VALGANCICLOVIR 450 MILLIGRAM(S): 450 TABLET, FILM COATED ORAL at 12:25

## 2025-05-04 RX ADMIN — LABETALOL HYDROCHLORIDE 100 MILLIGRAM(S): 200 TABLET, FILM COATED ORAL at 12:26

## 2025-05-04 RX ADMIN — Medication 500000 UNIT(S): at 17:05

## 2025-05-04 RX ADMIN — TACROLIMUS 2 MILLIGRAM(S): 0.5 CAPSULE ORAL at 08:39

## 2025-05-04 RX ADMIN — Medication 500000 UNIT(S): at 12:25

## 2025-05-04 RX ADMIN — INSULIN LISPRO 4: 100 INJECTION, SOLUTION INTRAVENOUS; SUBCUTANEOUS at 08:39

## 2025-05-04 RX ADMIN — AMLODIPINE BESYLATE 10 MILLIGRAM(S): 10 TABLET ORAL at 05:50

## 2025-05-04 RX ADMIN — Medication 650 MILLIGRAM(S): at 05:49

## 2025-05-04 RX ADMIN — MYCOPHENOLIC ACID 180 MILLIGRAM(S): 360 TABLET, DELAYED RELEASE ORAL at 08:39

## 2025-05-04 RX ADMIN — TAMSULOSIN HYDROCHLORIDE 0.4 MILLIGRAM(S): 0.4 CAPSULE ORAL at 22:19

## 2025-05-04 RX ADMIN — Medication 500000 UNIT(S): at 00:55

## 2025-05-04 RX ADMIN — Medication 1 TABLET(S): at 12:25

## 2025-05-04 RX ADMIN — INSULIN LISPRO 4: 100 INJECTION, SOLUTION INTRAVENOUS; SUBCUTANEOUS at 17:05

## 2025-05-04 RX ADMIN — Medication 300 MILLIGRAM(S): at 12:25

## 2025-05-04 RX ADMIN — Medication 5 MILLIGRAM(S): at 22:18

## 2025-05-04 RX ADMIN — Medication 650 MILLIGRAM(S): at 22:17

## 2025-05-04 RX ADMIN — INSULIN GLARGINE-YFGN 3 UNIT(S): 100 INJECTION, SOLUTION SUBCUTANEOUS at 22:18

## 2025-05-04 RX ADMIN — Medication 20 MILLIGRAM(S): at 12:26

## 2025-05-04 RX ADMIN — Medication 650 MILLIGRAM(S): at 23:17

## 2025-05-04 RX ADMIN — METHYLPREDNISOLONE ACETATE 100 MILLIGRAM(S): 80 INJECTION, SUSPENSION INTRA-ARTICULAR; INTRALESIONAL; INTRAMUSCULAR; SOFT TISSUE at 12:27

## 2025-05-04 RX ADMIN — ROSUVASTATIN CALCIUM 20 MILLIGRAM(S): 20 TABLET, FILM COATED ORAL at 22:19

## 2025-05-04 RX ADMIN — MYCOPHENOLIC ACID 360 MILLIGRAM(S): 360 TABLET, DELAYED RELEASE ORAL at 19:25

## 2025-05-04 RX ADMIN — HEPARIN SODIUM 5000 UNIT(S): 1000 INJECTION INTRAVENOUS; SUBCUTANEOUS at 17:05

## 2025-05-04 RX ADMIN — LABETALOL HYDROCHLORIDE 100 MILLIGRAM(S): 200 TABLET, FILM COATED ORAL at 05:51

## 2025-05-04 RX ADMIN — LABETALOL HYDROCHLORIDE 100 MILLIGRAM(S): 200 TABLET, FILM COATED ORAL at 22:47

## 2025-05-04 RX ADMIN — Medication 1300 MILLIGRAM(S): at 17:04

## 2025-05-04 RX ADMIN — LABETALOL HYDROCHLORIDE 100 MILLIGRAM(S): 200 TABLET, FILM COATED ORAL at 17:04

## 2025-05-04 NOTE — CONSULT NOTE ADULT - SUBJECTIVE AND OBJECTIVE BOX
[facility] DIVISION OF KIDNEY DISEASES AND HYPERTENSION -- INITIAL CONSULT NOTE  --------------------------------------------------------------------------------  HPI: Pt. is a 79 y.o. M w/ PMHx HTN, left inguinal hernia repair, HLD, 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 Transplant nephrology consulted for DDRT.    Pt. seen and examined. Pt. states that he had blood work done outpatient which showed an LISA to 3.06. Pt.'s baseline Scr ranges from 1.2-1.7. Pt. states he feels well and has been in his usual state of health. No fevers, chills, CP, N/V, dysuria, hematuria, diarrhea, or constipation.     PAST HISTORY  --------------------------------------------------------------------------------  PAST MEDICAL & SURGICAL HISTORY:  HTN (hypertension)    PKD (polycystic kidney disease)    HLD (hyperlipidemia)    S/p cadaver renal transplant    FAMILY HISTORY:    Social History:    ALLERGIES & MEDICATIONS  --------------------------------------------------------------------------------  Allergies    No Known Allergies    Intolerances    Standing Inpatient Medications  famotidine    Tablet 20 milliGRAM(s) Oral daily  heparin   Injectable 5000 Unit(s) SubCutaneous every 12 hours  labetalol 100 milliGRAM(s) Oral three times a day  mycophenolic acid  milliGRAM(s) Oral <User Schedule>  nystatin    Suspension 565117 Unit(s) Oral four times a day  rosuvastatin 20 milliGRAM(s) Oral at bedtime  senna 2 Tablet(s) Oral at bedtime  sodium bicarbonate 650 milliGRAM(s) Oral two times a day  tamsulosin 0.4 milliGRAM(s) Oral at bedtime    PRN Inpatient Medications    REVIEW OF SYSTEMS  --------------------------------------------------------------------------------  Gen: No fevers/chills, weakness  Skin: No rash  Head/Eyes/Ears/Mouth: No headache  Respiratory: No dyspnea  CV: No chest pain  GI: No abdominal pain  : No increased frequency  MSK: No edema  Neuro: No dizziness/lightheadedness  Heme: No easy bruising or bleeding  Endo: No heat/cold intolerance  Psych: No significant nervousness    VITALS/PHYSICAL EXAM  --------------------------------------------------------------------------------  T(C): 36.7 (05-02-25 @ 18:12), Max: 36.7 (05-02-25 @ 18:12)  HR: 81 (05-02-25 @ 18:12) (81 - 81)  BP: 154/72 (05-02-25 @ 18:12) (154/72 - 154/72)  RR: 18 (05-02-25 @ 18:12) (18 - 18)  SpO2: 99% (05-02-25 @ 18:12) (99% - 99%)  Wt(kg): --  Height (cm): 167.6 (05-02-25 @ 18:12)  Weight (kg): 65.1 (05-02-25 @ 18:12)  BMI (kg/m2): 23.2 (05-02-25 @ 18:12)  BSA (m2): 1.74 (05-02-25 @ 18:12)    Physical Exam:  Gen: Not in distress, well-appearing  HEENT: No scelral icterus, moist oral mucosa  Pulm: Lungs clear to auscultation bilaterally   CV: regular rate and rhythm, S1 and S2 normal   Abd: Soft and non distended abdomen  Transplant non tender, no bruit  : No suprapubic tenderness  Back: No pre sacral edema  Extremities: No edema. Distal pulses 2+ bilaterally   Skin: Warm  Neuro: Alert and oriented to person, place and time. Normal speech.     LABS/STUDIES  --------------------------------------------------------------------------------    HBsAb 305.2      [12-05-24 @ 16:06]  HBsAg Nonreact      [12-05-24 @ 16:06]  HBcAb Reactive      [12-05-24 @ 16:06]  HCV 0.04, Nonreact      [12-05-24 @ 16:06]  HIV Nonreact      [12-05-24 @ 16:06]    Tacrolimus  Cyclosporine  Sirolimus  Mycophenolate  BK PCR  CMV PCR  Parvo PCR  EBV PCR
Interventional Radiology    HPI: 80y Male with PMHx HTN, left inguinal hernia repair, HLD, PKD for past ~12 years S/P DDRT 1a/1v/1u+stent on 2024 under Thymoglobulin induction. post op had episode of aflutter on tele w/ 1st degree av block, EKG normal cardiology recommended continuing amlodipine and labetalol. Patient presents 25 as a direct admit, was sent from outpatient with an LISA Cr-3.06 in outpatient labs, baseline Cr of 1.2-1.7. Patient denies fevers, chills, N&V, dysuria, hematuria, trouble voiding, diarrhea, or constipation. He reports making a good amount of urine denies pain at surgical site.     Allergies: No Known Allergies    Medications (Abx/Cardiac/Anticoagulation/Blood Products)    amLODIPine   Tablet: 10 milliGRAM(s) Oral ( @ 05:25)  heparin   Injectable: 5000 Unit(s) SubCutaneous ( @ 05:25)  labetalol: 100 milliGRAM(s) Oral ( @ 20:50)  nystatin    Suspension: 421872 Unit(s) Oral ( @ 17:27)  trimethoprim   80 mG/sulfamethoxazole 400 m Tablet(s) Oral ( @ 11:53)  valGANciclovir: 450 milliGRAM(s) Oral ( @ 11:52)    Data:    T(C): 36.8  HR: 98  BP: 144/72  RR: 18  SpO2: 96%    -WBC 6.88 / HgB 12.5 / Hct 37.4 / Plt 313  -Na 136 / Cl 104 / BUN 54 / Glucose 201  -K 4.4 / CO2 13 / Cr 2.84  -ALT 10 / Alk Phos 55 / T.Bili 0.3  -INR 0.84 / PTT 24.6      Imaging: reveiwed.     -----------------------------------------------------------------------------------------------------------------------------------------------------------------------    Assessment/Plan: 79M HTN, left inguinal hernia repair, HLD, PKD S/P DDRT (2024) presenting with acute kidney injury. IR consulted for transplant renal biopsy.    -- Case discussed with IR attending Dr. Orosco. IR will plan to perform transplant renal biopsy on .  -- NPO at midnight on   -- Pt not on ASA or full AC. Hold AM prophylactic anticoagulation on . If on therapeutic AC or new medication started after this consult date, please call or page to verify how long it needs to be held if not already addressed.  -- Monitor BP, preferably <140/90.   -- AM coags and labs, active T&S  -- Please place "IR procedure" order under Dr. Orosco.  -- Discussed with EROS Erazo.    Thank You for the consultation.      Pati Turner D.O.  Radiology Resident (PGY-5)     -Available on [employer] TEAMS for all non-urgent questions  -Emergent issues: [facility]-p.[phone]; [facility]-p.[phone] ([phone])  -Non-emergent consults: Please place a [place] order "Consult-Interventional Radiology" with an appropriate callback number  -Scheduling questions: [facility]: [phone]; [facility]: [phone]  -Clinic/Outpatient booking: [facility]: [phone]; [facility]: [phone]

## 2025-05-04 NOTE — PROGRESS NOTE ADULT - SUBJECTIVE AND OBJECTIVE BOX
Patient is admitted with LISA, possible Acute rejection, on empirical steroids    MEDICATIONS  (STANDING):  allopurinol 300 milliGRAM(s) Oral daily  amLODIPine   Tablet 10 milliGRAM(s) Oral daily  dextrose 5%. 1000 milliLiter(s) (50 mL/Hr) IV Continuous <Continuous>  dextrose 5%. 1000 milliLiter(s) (100 mL/Hr) IV Continuous <Continuous>  dextrose 50% Injectable 25 Gram(s) IV Push once  dextrose 50% Injectable 12.5 Gram(s) IV Push once  dextrose 50% Injectable 25 Gram(s) IV Push once  famotidine    Tablet 20 milliGRAM(s) Oral daily  glucagon  Injectable 1 milliGRAM(s) IntraMuscular once  insulin glargine Injectable (LANTUS) 3 Unit(s) SubCutaneous at bedtime  insulin lispro (ADMELOG) corrective regimen sliding scale   SubCutaneous three times a day before meals  labetalol 100 milliGRAM(s) Oral three times a day  melatonin 5 milliGRAM(s) Oral at bedtime  mycophenolic acid  milliGRAM(s) Oral <User Schedule>  nystatin    Suspension 703272 Unit(s) Oral four times a day  rosuvastatin 20 milliGRAM(s) Oral at bedtime  senna 2 Tablet(s) Oral at bedtime  sodium bicarbonate 1300 milliGRAM(s) Oral two times a day  tacrolimus ER Tablet (ENVARSUS XR) 2 milliGRAM(s) Oral <User Schedule>  tamsulosin 0.4 milliGRAM(s) Oral at bedtime  trimethoprim   80 mG/sulfamethoxazole 400 mG 1 Tablet(s) Oral daily  valGANciclovir 450 milliGRAM(s) Oral daily    MEDICATIONS  (PRN):  dextrose Oral Gel 15 Gram(s) Oral once PRN Blood Glucose LESS THAN 70 milliGRAM(s)/deciliter  Vital Signs Last 24 Hrs  T(C): 36.6 (05-04-25 @ 16:41)  T(F): 97.9 (05-04-25 @ 16:41), Max: 98.2 (05-03-25 @ 20:51)  HR: 93 (05-04-25 @ 16:41) (84 - 101)  BP: 146/74 (05-04-25 @ 16:41)  BP(mean): --  RR: 18 (05-04-25 @ 16:41) (18 - 18)  SpO2: 96% (05-04-25 @ 16:41) (94% - 98%)  Wt(kg): --    05-03 @ 07:01  -  05-04 @ 07:00  --------------------------------------------------------  IN: 540 mL / OUT: 1525 mL / NET: -985 mL    05-04 @ 07:01  -  05-04 @ 19:27  --------------------------------------------------------  IN: 480 mL / OUT: 622 mL / NET: -142 mL    VS noted    On exam:  No acute distress/not dyspnoic   chect/cv: no acute signs  Abd: Soft, non tender  Ext: no acute signs  No tremor      LABS/STUDIES  --------------------------------------------------------------------------------              11.9   18.49 >-----------<  302      [05-04-25 @ 07:11]              34.6     134  |  103  |  71  ----------------------------<  200      [05-04-25 @ 07:05]  4.2   |  13  |  3.42        Ca     9.4     [05-04-25 @ 07:05]      Mg     2.1     [05-04-25 @ 07:05]      Phos  4.6     [05-04-25 @ 07:05]    TPro  6.9  /  Alb  4.4  /  TBili  0.2  /  DBili  x   /  AST  10  /  ALT  8   /  AlkPhos  49  [05-04-25 @ 07:05]          Creatinine Trend:  SCr 3.42 [05-04 @ 07:05]  SCr 2.84 [05-03 @ 07:13]    Tacrolimus (), Serum: 7.4 ng/mL (05-04 @ 07:11)  Tacrolimus (), Serum: 9.4 ng/mL (05-03 @ 07:11)

## 2025-05-04 NOTE — PROGRESS NOTE ADULT - ASSESSMENT
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 post op had episode of aflutter on tele w/ 1st degree av block, EKG normal cardiology recommended continuing amlodipine and labetalol. Patient presents 5/2/25 as a direct admit, was sent from outpatient with an LISA Cr-3.06 in outpatient labs, baseline Cr of 1.2-1.7    [] LISA/ S/P DDRT 12/5/24  - Renal US: patent vasc. complex cystic lesion 1.7cm, enlarged prostate   - bowel regimen  - Pulse steroids 5/2 - Solumed 500mg, 5/3 - Solumed 250mg  - DSA sent  - IR c/s for biopsy monday    [] Immuno:     - Envarsus 2, Myfortic 180 BID, Pred 2.5  - PPX: Valcyte, Bactrim, Pepcid    [] DVT PPx  - SQH BID    [] HTN (Hypertension).     - Monitor bP.   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 post op had episode of aflutter on tele w/ 1st degree av block, EKG normal cardiology recommended continuing amlodipine and labetalol. Patient presents 5/2/25 as a direct admit, was sent from outpatient with an LISA Cr-3.06 in outpatient labs, baseline Cr of 1.2-1.7    [] LISA/ S/P DDRT 12/5/24  - Renal US: patent vasc. complex cystic lesion 1.7cm, enlarged prostate   - bowel regimen  - Pulse steroids        - 5/2 - Solumed 500mg,        - 5/3 - Solumed 250mg,        - 5/4 - Solumed 250mg  - DSA neg  - IR c/s for biopsy tomorrow, NPO midnight, hold SQH  - f/u Post-void bladder scan  - f/u immunofixation    [] Immuno:     - Envarsus 2, Myfortic 360 BID, Pred 2.5  - PPX: Valcyte, Bactrim, Pepcid    [] DVT PPx  - SQH BID    [] HTN (Hypertension).     - Monitor bP.   - Labetalol 100mg TID, Amlodipine 5mg qd

## 2025-05-04 NOTE — PROGRESS NOTE ADULT - SUBJECTIVE AND OBJECTIVE BOX
Transplant Surgery - Multidisciplinary Rounds  --------------------------------------------------------------   Tx Date:  DDRT 12/5/2024    Present: Patient seen and examined with multidisciplinary Transplant team including Surgeon, Hepatologist, Surgical/Hepatology fellow, TATIANA, EROS Erazo Nutritionist,  and bedside RN during AM rounds.   Disciplines not in attendance will be notified of the plan.     HPI: 79 male with PMHx HTN, left inguinal hernia repair, HLD, 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 post op had episode of aflutter on tele w/ 1st degree av block, EKG normal cardiology recommended continuing amlodipine and labetalol. Patient presents 5/2/25 as a direct admit, was sent from outpatient with an LISA Cr-3.06 in outpatient labs, baseline Cr of 1.2-1.7    Interval Events:            Immunosupression:  Maintenance: FK per level/Myfortic 180 BID/Steroid Pulse  Ongoing monitoring for signs of rejection     Potential Discharge date: TBD  Education:  Medications  Plan of care:  See Below                                  Review of systems  Gen: No weight changes, fatigue, fevers/chills, weakness  Skin: No rashes  Head/Eyes/Ears/Mouth: No headache; Normal hearing; Normal vision w/o blurriness; No sinus pain/discomfort, sore throat  Respiratory: No dyspnea, cough, wheezing, hemoptysis  CV: No chest pain, PND, orthopnea  GI:  denies diarrhea, constipation, nausea, vomiting, melena, hematochezia  : No increased frequency, dysuria, hematuria, nocturia  MSK: No joint pain/swelling; no back pain; no edema  Neuro: No dizziness/lightheadedness, weakness, seizures, numbness, tingling  Heme: No easy bruising or bleeding  Endo: No heat/cold intolerance  Psych: No significant nervousness, anxiety, stress, depression  All other systems were reviewed and are negative, except as noted.      PHYSICAL EXAM:  Constitutional: Well developed / well nourished  Eyes: Anicteric, PERRLA  ENMT: nc/at  Neck: supple  Respiratory: CTA B/L  Cardiovascular: RRR  Gastrointestinal: Soft, non distended, well healed scar  Genitourinary: Voiding spontaneously  Extremities: SCD's in place and working bilaterally  Vascular: Palpable dp pulses bilaterally  Neurological: A&O x3  Skin: no rashes, ulcerations or lesions;  Musculoskeletal: Moving all extremities  Psychiatric: Responsive     Transplant Surgery - Multidisciplinary Rounds  --------------------------------------------------------------   Tx Date:  DDRT 12/5/2024    Present: Patient seen and examined with multidisciplinary Transplant team including Surgeon, Hepatologist, Surgical/Hepatology fellow, TATIANA, EROS Erazo Nutritionist,  and bedside RN during AM rounds.   Disciplines not in attendance will be notified of the plan.     HPI: 79 male with PMHx HTN, left inguinal hernia repair, HLD, 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 post op had episode of aflutter on tele w/ 1st degree av block, EKG normal cardiology recommended continuing amlodipine and labetalol. Patient presents 5/2/25 as a direct admit, was sent from outpatient with an LISA Cr-3.06 in outpatient labs, baseline Cr of 1.2-1.7    Interval Events:  - Afebrile, VSS  - Refused Tele ON  - Cr uptrend to 3.42      Immunosupression:  Maintenance: FK per level/Myfortic 180 BID/Steroid Pulse  Ongoing monitoring for signs of rejection     Potential Discharge date: TBD  Education:  Medications  Plan of care:  See Below      MEDICATIONS  (STANDING):  allopurinol 300 milliGRAM(s) Oral daily  amLODIPine   Tablet 10 milliGRAM(s) Oral daily  dextrose 5%. 1000 milliLiter(s) (50 mL/Hr) IV Continuous <Continuous>  dextrose 5%. 1000 milliLiter(s) (100 mL/Hr) IV Continuous <Continuous>  dextrose 50% Injectable 25 Gram(s) IV Push once  dextrose 50% Injectable 12.5 Gram(s) IV Push once  dextrose 50% Injectable 25 Gram(s) IV Push once  famotidine    Tablet 20 milliGRAM(s) Oral daily  glucagon  Injectable 1 milliGRAM(s) IntraMuscular once  heparin   Injectable 5000 Unit(s) SubCutaneous every 12 hours  insulin glargine Injectable (LANTUS) 3 Unit(s) SubCutaneous at bedtime  insulin lispro (ADMELOG) corrective regimen sliding scale   SubCutaneous three times a day before meals  labetalol 100 milliGRAM(s) Oral three times a day  melatonin 5 milliGRAM(s) Oral at bedtime  mycophenolic acid  milliGRAM(s) Oral <User Schedule>  nystatin    Suspension 484475 Unit(s) Oral four times a day  rosuvastatin 20 milliGRAM(s) Oral at bedtime  senna 2 Tablet(s) Oral at bedtime  sodium bicarbonate 1300 milliGRAM(s) Oral two times a day  tacrolimus ER Tablet (ENVARSUS XR) 2 milliGRAM(s) Oral <User Schedule>  tamsulosin 0.4 milliGRAM(s) Oral at bedtime  trimethoprim   80 mG/sulfamethoxazole 400 mG 1 Tablet(s) Oral daily  valGANciclovir 450 milliGRAM(s) Oral daily    MEDICATIONS  (PRN):  dextrose Oral Gel 15 Gram(s) Oral once PRN Blood Glucose LESS THAN 70 milliGRAM(s)/deciliter      PAST MEDICAL & SURGICAL HISTORY:  HTN (hypertension)      PKD (polycystic kidney disease)      HLD (hyperlipidemia)      S/p cadaver renal transplant          Vital Signs Last 24 Hrs  T(C): 36.6 (04 May 2025 16:41), Max: 36.8 (03 May 2025 20:51)  T(F): 97.9 (04 May 2025 16:41), Max: 98.2 (03 May 2025 20:51)  HR: 93 (04 May 2025 16:41) (84 - 101)  BP: 146/74 (04 May 2025 16:41) (116/68 - 155/73)  BP(mean): --  RR: 18 (04 May 2025 16:41) (18 - 18)  SpO2: 96% (04 May 2025 16:41) (94% - 98%)    Parameters below as of 04 May 2025 16:41  Patient On (Oxygen Delivery Method): room air        I&O's Summary    03 May 2025 07:01  -  04 May 2025 07:00  --------------------------------------------------------  IN: 540 mL / OUT: 1525 mL / NET: -985 mL    04 May 2025 07:01  -  04 May 2025 17:01  --------------------------------------------------------  IN: 480 mL / OUT: 622 mL / NET: -142 mL                              11.9   18.49 )-----------( 302      ( 04 May 2025 07:11 )             34.6     05-04    134[L]  |  103  |  71[H]  ----------------------------<  200[H]  4.2   |  13[L]  |  3.42[H]    Ca    9.4      04 May 2025 07:05  Phos  4.6     05-04  Mg     2.1     05-04    TPro  6.9  /  Alb  4.4  /  TBili  0.2  /  DBili  x   /  AST  10  /  ALT  8[L]  /  AlkPhos  49  05-04    Tacrolimus (), Serum: 7.4 ng/mL (05-04 @ 07:11)        Culture - Urine (collected 05-03-25 @ 07:05)  Source: Clean Catch Clean Catch (Midstream)  Final Report (05-04-25 @ 07:49):    <10,000 CFU/mL Normal Urogenital Stephenie    Culture - Blood (collected 05-02-25 @ 19:45)  Source: Blood Blood  Preliminary Report (05-04-25 @ 01:02):    No growth at 24 hours    Culture - Blood (collected 05-02-25 @ 19:30)  Source: Blood Blood  Preliminary Report (05-04-25 @ 01:02):    No growth at 24 hours                Review of systems  Gen: No weight changes, fatigue, fevers/chills, weakness  Skin: No rashes  Head/Eyes/Ears/Mouth: No headache; Normal hearing; Normal vision w/o blurriness; No sinus pain/discomfort, sore throat  Respiratory: No dyspnea, cough, wheezing, hemoptysis  CV: No chest pain, PND, orthopnea  GI:  denies diarrhea, constipation, nausea, vomiting, melena, hematochezia  : No increased frequency, dysuria, hematuria, nocturia  MSK: No joint pain/swelling; no back pain; no edema  Neuro: No dizziness/lightheadedness, weakness, seizures, numbness, tingling  Heme: No easy bruising or bleeding  Endo: No heat/cold intolerance  Psych: No significant nervousness, anxiety, stress, depression  All other systems were reviewed and are negative, except as noted.      PHYSICAL EXAM:  Constitutional: Well developed / well nourished  Eyes: Anicteric, PERRLA  ENMT: nc/at  Neck: supple  Respiratory: CTA B/L  Cardiovascular: RRR  Gastrointestinal: Soft, non distended, well healed scar  Genitourinary: Voiding spontaneously  Extremities: SCD's in place and working bilaterally  Vascular: Palpable dp pulses bilaterally  Neurological: A&O x3  Skin: no rashes, ulcerations or lesions;  Musculoskeletal: Moving all extremities  Psychiatric: Responsive

## 2025-05-05 ENCOUNTER — RESULT REVIEW (OUTPATIENT)
Age: 80
End: 2025-05-05

## 2025-05-05 ENCOUNTER — NON-APPOINTMENT (OUTPATIENT)
Age: 80
End: 2025-05-05

## 2025-05-05 LAB
ALBUMIN SERPL ELPH-MCNC: 4 G/DL — SIGNIFICANT CHANGE UP (ref 3.3–5)
ALP SERPL-CCNC: 46 U/L — SIGNIFICANT CHANGE UP (ref 40–120)
ALT FLD-CCNC: 9 U/L — LOW (ref 10–45)
ANION GAP SERPL CALC-SCNC: 18 MMOL/L — HIGH (ref 5–17)
APTT BLD: 22.9 SEC — LOW (ref 26.1–36.8)
AST SERPL-CCNC: 15 U/L — SIGNIFICANT CHANGE UP (ref 10–40)
BASOPHILS # BLD AUTO: 0.02 K/UL — SIGNIFICANT CHANGE UP (ref 0–0.2)
BASOPHILS NFR BLD AUTO: 0.1 % — SIGNIFICANT CHANGE UP (ref 0–2)
BILIRUB SERPL-MCNC: 0.2 MG/DL — SIGNIFICANT CHANGE UP (ref 0.2–1.2)
BUN SERPL-MCNC: 89 MG/DL — HIGH (ref 7–23)
CALCIUM SERPL-MCNC: 9 MG/DL — SIGNIFICANT CHANGE UP (ref 8.4–10.5)
CHLORIDE SERPL-SCNC: 103 MMOL/L — SIGNIFICANT CHANGE UP (ref 96–108)
CMV DNA CSF QL NAA+PROBE: SIGNIFICANT CHANGE UP IU/ML
CMV DNA SPEC NAA+PROBE-LOG#: SIGNIFICANT CHANGE UP LOG10IU/ML
CO2 SERPL-SCNC: 13 MMOL/L — LOW (ref 22–31)
CREAT SERPL-MCNC: 3.6 MG/DL — HIGH (ref 0.5–1.3)
EGFR: 16 ML/MIN/1.73M2 — LOW
EGFR: 16 ML/MIN/1.73M2 — LOW
EOSINOPHIL # BLD AUTO: 0 K/UL — SIGNIFICANT CHANGE UP (ref 0–0.5)
EOSINOPHIL NFR BLD AUTO: 0 % — SIGNIFICANT CHANGE UP (ref 0–6)
GLUCOSE BLDC GLUCOMTR-MCNC: 186 MG/DL — HIGH (ref 70–99)
GLUCOSE BLDC GLUCOMTR-MCNC: 194 MG/DL — HIGH (ref 70–99)
GLUCOSE BLDC GLUCOMTR-MCNC: 224 MG/DL — HIGH (ref 70–99)
GLUCOSE BLDC GLUCOMTR-MCNC: 311 MG/DL — HIGH (ref 70–99)
GLUCOSE SERPL-MCNC: 177 MG/DL — HIGH (ref 70–99)
HCT VFR BLD CALC: 33 % — LOW (ref 39–50)
HGB BLD-MCNC: 11.7 G/DL — LOW (ref 13–17)
IMM GRANULOCYTES NFR BLD AUTO: 1.6 % — HIGH (ref 0–0.9)
INR BLD: 0.92 RATIO — SIGNIFICANT CHANGE UP (ref 0.85–1.16)
LYMPHOCYTES # BLD AUTO: 0.32 K/UL — LOW (ref 1–3.3)
LYMPHOCYTES # BLD AUTO: 2.2 % — LOW (ref 13–44)
MAGNESIUM SERPL-MCNC: 2 MG/DL — SIGNIFICANT CHANGE UP (ref 1.6–2.6)
MCHC RBC-ENTMCNC: 33.4 PG — SIGNIFICANT CHANGE UP (ref 27–34)
MCHC RBC-ENTMCNC: 35.5 G/DL — SIGNIFICANT CHANGE UP (ref 32–36)
MCV RBC AUTO: 94.3 FL — SIGNIFICANT CHANGE UP (ref 80–100)
MONOCYTES # BLD AUTO: 0.22 K/UL — SIGNIFICANT CHANGE UP (ref 0–0.9)
MONOCYTES NFR BLD AUTO: 1.5 % — LOW (ref 2–14)
NEUTROPHILS # BLD AUTO: 13.5 K/UL — HIGH (ref 1.8–7.4)
NEUTROPHILS NFR BLD AUTO: 94.6 % — HIGH (ref 43–77)
NRBC BLD AUTO-RTO: 0 /100 WBCS — SIGNIFICANT CHANGE UP (ref 0–0)
PHOSPHATE SERPL-MCNC: 5.1 MG/DL — HIGH (ref 2.5–4.5)
PLATELET # BLD AUTO: 297 K/UL — SIGNIFICANT CHANGE UP (ref 150–400)
POTASSIUM SERPL-MCNC: 4.4 MMOL/L — SIGNIFICANT CHANGE UP (ref 3.5–5.3)
POTASSIUM SERPL-SCNC: 4.4 MMOL/L — SIGNIFICANT CHANGE UP (ref 3.5–5.3)
PROT SERPL-MCNC: 6.3 G/DL — SIGNIFICANT CHANGE UP (ref 6–8.3)
PROTHROM AB SERPL-ACNC: 10.5 SEC — SIGNIFICANT CHANGE UP (ref 9.9–13.4)
RBC # BLD: 3.5 M/UL — LOW (ref 4.2–5.8)
RBC # FLD: 13.6 % — SIGNIFICANT CHANGE UP (ref 10.3–14.5)
SODIUM SERPL-SCNC: 134 MMOL/L — LOW (ref 135–145)
TACROLIMUS SERPL-MCNC: 6.4 NG/ML
TACROLIMUS SERPL-MCNC: 7.2 NG/ML — SIGNIFICANT CHANGE UP
URATE SERPL-MCNC: 4 MG/DL — SIGNIFICANT CHANGE UP (ref 3.4–8.8)
WBC # BLD: 14.29 K/UL — HIGH (ref 3.8–10.5)
WBC # FLD AUTO: 14.29 K/UL — HIGH (ref 3.8–10.5)

## 2025-05-05 PROCEDURE — 88305 TISSUE EXAM BY PATHOLOGIST: CPT | Mod: 26

## 2025-05-05 PROCEDURE — 88313 SPECIAL STAINS GROUP 2: CPT | Mod: 26

## 2025-05-05 PROCEDURE — 74176 CT ABD & PELVIS W/O CONTRAST: CPT | Mod: 26

## 2025-05-05 PROCEDURE — 76776 US EXAM K TRANSPL W/DOPPLER: CPT | Mod: 26,RT

## 2025-05-05 PROCEDURE — 99232 SBSQ HOSP IP/OBS MODERATE 35: CPT | Mod: GC

## 2025-05-05 PROCEDURE — 76942 ECHO GUIDE FOR BIOPSY: CPT | Mod: 26,59

## 2025-05-05 PROCEDURE — 88346 IMFLUOR 1ST 1ANTB STAIN PX: CPT | Mod: 26

## 2025-05-05 PROCEDURE — 50200 RENAL BIOPSY PERQ: CPT | Mod: RT

## 2025-05-05 PROCEDURE — 88348 ELECTRON MICROSCOPY DX: CPT | Mod: 26

## 2025-05-05 PROCEDURE — 88350 IMFLUOR EA ADDL 1ANTB STN PX: CPT | Mod: 26

## 2025-05-05 RX ORDER — ACETAMINOPHEN 500 MG/5ML
650 LIQUID (ML) ORAL ONCE
Refills: 0 | Status: COMPLETED | OUTPATIENT
Start: 2025-05-05 | End: 2025-05-05

## 2025-05-05 RX ORDER — ONDANSETRON HCL/PF 4 MG/2 ML
4 VIAL (ML) INJECTION ONCE
Refills: 0 | Status: DISCONTINUED | OUTPATIENT
Start: 2025-05-05 | End: 2025-05-07

## 2025-05-05 RX ORDER — METHYLPREDNISOLONE ACETATE 80 MG/ML
125 INJECTION, SUSPENSION INTRA-ARTICULAR; INTRALESIONAL; INTRAMUSCULAR; SOFT TISSUE ONCE
Refills: 0 | Status: COMPLETED | OUTPATIENT
Start: 2025-05-05 | End: 2025-05-05

## 2025-05-05 RX ORDER — SODIUM BICARBONATE 1 MEQ/ML
1300 SYRINGE (ML) INTRAVENOUS THREE TIMES A DAY
Refills: 0 | Status: DISCONTINUED | OUTPATIENT
Start: 2025-05-05 | End: 2025-05-07

## 2025-05-05 RX ORDER — VALGANCICLOVIR 450 MG/1
450 TABLET, FILM COATED ORAL
Refills: 0 | Status: DISCONTINUED | OUTPATIENT
Start: 2025-05-05 | End: 2025-05-07

## 2025-05-05 RX ADMIN — Medication 650 MILLIGRAM(S): at 19:42

## 2025-05-05 RX ADMIN — INSULIN GLARGINE-YFGN 3 UNIT(S): 100 INJECTION, SOLUTION SUBCUTANEOUS at 21:33

## 2025-05-05 RX ADMIN — INSULIN LISPRO 2: 100 INJECTION, SOLUTION INTRAVENOUS; SUBCUTANEOUS at 13:22

## 2025-05-05 RX ADMIN — VALGANCICLOVIR 450 MILLIGRAM(S): 450 TABLET, FILM COATED ORAL at 13:12

## 2025-05-05 RX ADMIN — Medication 1300 MILLIGRAM(S): at 05:49

## 2025-05-05 RX ADMIN — Medication 20 MILLIGRAM(S): at 13:12

## 2025-05-05 RX ADMIN — Medication 300 MILLIGRAM(S): at 13:11

## 2025-05-05 RX ADMIN — Medication 1300 MILLIGRAM(S): at 13:11

## 2025-05-05 RX ADMIN — LABETALOL HYDROCHLORIDE 100 MILLIGRAM(S): 200 TABLET, FILM COATED ORAL at 06:03

## 2025-05-05 RX ADMIN — ROSUVASTATIN CALCIUM 20 MILLIGRAM(S): 20 TABLET, FILM COATED ORAL at 21:35

## 2025-05-05 RX ADMIN — Medication 5 MILLIGRAM(S): at 19:43

## 2025-05-05 RX ADMIN — METHYLPREDNISOLONE ACETATE 125 MILLIGRAM(S): 80 INJECTION, SUSPENSION INTRA-ARTICULAR; INTRALESIONAL; INTRAMUSCULAR; SOFT TISSUE at 09:52

## 2025-05-05 RX ADMIN — Medication 2 TABLET(S): at 21:34

## 2025-05-05 RX ADMIN — Medication 650 MILLIGRAM(S): at 21:00

## 2025-05-05 RX ADMIN — Medication 500000 UNIT(S): at 13:11

## 2025-05-05 RX ADMIN — Medication 1300 MILLIGRAM(S): at 21:35

## 2025-05-05 RX ADMIN — MYCOPHENOLIC ACID 360 MILLIGRAM(S): 360 TABLET, DELAYED RELEASE ORAL at 09:52

## 2025-05-05 RX ADMIN — Medication 500000 UNIT(S): at 17:13

## 2025-05-05 RX ADMIN — TAMSULOSIN HYDROCHLORIDE 0.4 MILLIGRAM(S): 0.4 CAPSULE ORAL at 21:35

## 2025-05-05 RX ADMIN — INSULIN LISPRO 8: 100 INJECTION, SOLUTION INTRAVENOUS; SUBCUTANEOUS at 17:13

## 2025-05-05 RX ADMIN — MYCOPHENOLIC ACID 360 MILLIGRAM(S): 360 TABLET, DELAYED RELEASE ORAL at 19:43

## 2025-05-05 RX ADMIN — TACROLIMUS 2 MILLIGRAM(S): 0.5 CAPSULE ORAL at 09:30

## 2025-05-05 RX ADMIN — AMLODIPINE BESYLATE 10 MILLIGRAM(S): 10 TABLET ORAL at 06:03

## 2025-05-05 RX ADMIN — LABETALOL HYDROCHLORIDE 100 MILLIGRAM(S): 200 TABLET, FILM COATED ORAL at 13:11

## 2025-05-05 RX ADMIN — LABETALOL HYDROCHLORIDE 100 MILLIGRAM(S): 200 TABLET, FILM COATED ORAL at 21:35

## 2025-05-05 NOTE — PRE PROCEDURE NOTE - PRE PROCEDURE EVALUATION
------------------------------------------------------------  Interventional Radiology Pre-Procedure Note  ------------------------------------------------------------    Indication: 80y Male with pmh of DDRT on 2024 with LISA. Plan for transplant renal biopsy today.     Past Medical History:  HTN (hypertension)    PKD (polycystic kidney disease)    HLD (hyperlipidemia)        Allergies: No Known Allergies      Medications:  amLODIPine   Tablet: 10 milliGRAM(s) Oral (25 @ 06:03)  heparin   Injectable: 5000 Unit(s) SubCutaneous (25 @ 17:05)  labetalol: 100 milliGRAM(s) Oral (25 @ 05:51)  labetalol: 100 milliGRAM(s) Oral (25 @ 12:26)  labetalol: 100 milliGRAM(s) Oral (25 @ 06:03)  nystatin    Suspension: 271089 Unit(s) Oral (25 @ 17:05)  trimethoprim   80 mG/sulfamethoxazole 400 m Tablet(s) Oral (25 @ 12:25)  valGANciclovir: 450 milliGRAM(s) Oral (25 @ 12:25)      Vital Signs:   T(F): 98.3 (07:25), Max: 98.3 (07:25)  HR: 94 (07:57)  BP: 133/66 (07:57)  RR: 18 (07:57)  SpO2: 99% (07:57)    Labs:           11.7  14.29)-----(297     (25 @ 07:15)         33.0     134 | 103 | 89  --------------------< 177     (25 @ 06:59)  4.4 | 13 | 3.60       PT: 10.5 25 @ 07:45  aPTT: 22.9[L] 25 @ 07:45   INR: 0.92 25 @ 07:45    Imaging: US transplant kidney 25 was reviewed     Consent: Risks/benefits/alternatives were explained and informed written consent was obtained.     Procedure Plan: Plan for transplant renal biopsy.

## 2025-05-05 NOTE — PROGRESS NOTE ADULT - SUBJECTIVE AND OBJECTIVE BOX
[facility] DIVISION OF KIDNEY DISEASES AND HYPERTENSION   FOLLOW UP NOTE    --------------------------------------------------------------------------------  Chief Complaint:    24 hour events/subjective: Pt. was seen and examined today. Feels well. S/p kidney biopsy 5/5    PAST HISTORY  --------------------------------------------------------------------------------  No significant changes to PMH, PSH, FHx, SHx, unless otherwise noted    ALLERGIES & MEDICATIONS  --------------------------------------------------------------------------------  Allergies    No Known Allergies    Intolerances      Standing Inpatient Medications  allopurinol 300 milliGRAM(s) Oral daily  amLODIPine   Tablet 10 milliGRAM(s) Oral daily  dextrose 5%. 1000 milliLiter(s) IV Continuous <Continuous>  dextrose 5%. 1000 milliLiter(s) IV Continuous <Continuous>  dextrose 50% Injectable 25 Gram(s) IV Push once  dextrose 50% Injectable 12.5 Gram(s) IV Push once  dextrose 50% Injectable 25 Gram(s) IV Push once  famotidine    Tablet 20 milliGRAM(s) Oral daily  glucagon  Injectable 1 milliGRAM(s) IntraMuscular once  insulin glargine Injectable (LANTUS) 3 Unit(s) SubCutaneous at bedtime  insulin lispro (ADMELOG) corrective regimen sliding scale   SubCutaneous three times a day before meals  labetalol 100 milliGRAM(s) Oral three times a day  melatonin 5 milliGRAM(s) Oral at bedtime  mycophenolic acid  milliGRAM(s) Oral <User Schedule>  nystatin    Suspension 630682 Unit(s) Oral four times a day  rosuvastatin 20 milliGRAM(s) Oral at bedtime  senna 2 Tablet(s) Oral at bedtime  sodium bicarbonate 1300 milliGRAM(s) Oral three times a day  tacrolimus ER Tablet (ENVARSUS XR) 2 milliGRAM(s) Oral <User Schedule>  tamsulosin 0.4 milliGRAM(s) Oral at bedtime  valGANciclovir 450 milliGRAM(s) Oral <User Schedule>    PRN Inpatient Medications  dextrose Oral Gel 15 Gram(s) Oral once PRN  ondansetron Injectable 4 milliGRAM(s) IV Push once PRN      REVIEW OF SYSTEMS  --------------------------------------------------------------------------------  Gen: No fevers/chills  Head/Eyes/Ears: No HA   Respiratory: No dyspnea, cough  CV: No chest pain  GI: No abdominal pain, diarrhea  : No dysuria, hematuria  MSK: No  edema  Skin: No rashes  Heme: No easy bruising or bleeding    All other systems were reviewed and are negative, except as noted.    VITALS/PHYSICAL EXAM  --------------------------------------------------------------------------------  T(C): 36.4 (05-05-25 @ 09:00), Max: 36.8 (05-05-25 @ 07:25)  HR: 101 (05-05-25 @ 13:20) (87 - 101)  BP: 151/73 (05-05-25 @ 13:20) (128/64 - 151/73)  RR: 20 (05-05-25 @ 13:20) (15 - 20)  SpO2: 100% (05-05-25 @ 13:20) (92% - 100%)  Wt(kg): --  Height (cm): 167.6 (05-05-25 @ 07:57)  Weight (kg): 65.1 (05-05-25 @ 07:57)  BMI (kg/m2): 23.2 (05-05-25 @ 07:57)  BSA (m2): 1.74 (05-05-25 @ 07:57)      05-04-25 @ 07:01  -  05-05-25 @ 07:00  --------------------------------------------------------  IN: 480 mL / OUT: 1572 mL / NET: -1092 mL        Physical Exam:  	Gen: NAD  	HEENT: Anicteric  	Pulm: CTA B/L  	CV: S1S2+  	Abd: Soft, dressing at biopsy site           Transplant site: non tender, well healed surgical scar+  	Ext: No LE edema B/L  	Neuro: Awake  	Skin: Warm and dry      LABS/STUDIES  --------------------------------------------------------------------------------              11.7   14.29 >-----------<  297      [05-05-25 @ 07:15]              33.0     134  |  103  |  89  ----------------------------<  177      [05-05-25 @ 06:59]  4.4   |  13  |  3.60        Ca     9.0     [05-05-25 @ 06:59]      Mg     2.0     [05-05-25 @ 06:59]      Phos  5.1     [05-05-25 @ 06:59]    TPro  6.3  /  Alb  4.0  /  TBili  0.2  /  DBili  x   /  AST  15  /  ALT  9   /  AlkPhos  46  [05-05-25 @ 06:59]    PT/INR: PT 10.5 , INR 0.92       [05-05-25 @ 07:45]  PTT: 22.9       [05-05-25 @ 07:45]    Uric acid 4.0      [05-05-25 @ 06:59]    Creatinine Trend:  SCr 3.60 [05-05 @ 06:59]  SCr 3.42 [05-04 @ 07:05]  SCr 2.84 [05-03 @ 07:13]    Urinalysis - [05-05-25 @ 06:59]      Color  / Appearance  / SG  / pH       Gluc 177 / Ketone   / Bili  / Urobili        Blood  / Protein  / Leuk Est  / Nitrite       RBC  / WBC  / Hyaline  / Gran  / Sq Epi  / Non Sq Epi  / Bacteria           TacrolimusTacrolimus (), Serum: 7.2 ng/mL (05-05 @ 07:00)  Tacrolimus (), Serum: 7.4 ng/mL (05-04 @ 07:11)  Tacrolimus (), Serum: 9.4 ng/mL (05-03 @ 07:11)    Cyclosporine  Sirolimus  Mycophenolate  BK PCR  CMV PCRCMVPCR Log: NotDetec Kvl29EO/mL (05-02 @ 19:51)    Parvo PCR  EBV PCR

## 2025-05-05 NOTE — PROGRESS NOTE ADULT - SUBJECTIVE AND OBJECTIVE BOX
Transplant Surgery - Multidisciplinary Rounds  --------------------------------------------------------------   Tx Date:  DDRT 12/5/2024    Present: Patient seen and examined with multidisciplinary Transplant team including Surgeon Dr. Atkinson, Hepatologist, Surgical/Hepatology fellow, TATIANA, EROS Chairez, PA Student Jimena, Nutritionist,  and bedside RN during AM rounds.  Disciplines not in attendance will be notified of the plan.     HPI: 79 male with PMHx HTN, left inguinal hernia repair, HLD, 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 post op had episode of aflutter on tele w/ 1st degree av block, EKG normal cardiology recommended continuing amlodipine and labetalol. Patient presents 5/2/25 as a direct admit, was sent from outpatient with an LISA Cr-3.06 in outpatient labs, baseline Cr of 1.2-1.7    Interval Events:  - Afebrile, VSS  - Bx tomorrow NPO midnight, hold SQH  - Telemetry in pt room not working  - Cr uptrend to 3.42  - O/N: tylenol x 1      Immunosupression:  Maintenance: Envarsus by level/Myfortic 360 BID/Steroid Pulse  Ongoing monitoring for signs of rejection     Potential Discharge date: TBD  Education:  Medications  Plan of care:  See Below        MEDICATIONS  (STANDING):  allopurinol 300 milliGRAM(s) Oral daily  amLODIPine   Tablet 10 milliGRAM(s) Oral daily  dextrose 5%. 1000 milliLiter(s) (50 mL/Hr) IV Continuous <Continuous>  dextrose 5%. 1000 milliLiter(s) (100 mL/Hr) IV Continuous <Continuous>  dextrose 50% Injectable 25 Gram(s) IV Push once  dextrose 50% Injectable 12.5 Gram(s) IV Push once  dextrose 50% Injectable 25 Gram(s) IV Push once  famotidine    Tablet 20 milliGRAM(s) Oral daily  glucagon  Injectable 1 milliGRAM(s) IntraMuscular once  insulin glargine Injectable (LANTUS) 3 Unit(s) SubCutaneous at bedtime  insulin lispro (ADMELOG) corrective regimen sliding scale   SubCutaneous three times a day before meals  labetalol 100 milliGRAM(s) Oral three times a day  melatonin 5 milliGRAM(s) Oral at bedtime  mycophenolic acid  milliGRAM(s) Oral <User Schedule>  nystatin    Suspension 307013 Unit(s) Oral four times a day  rosuvastatin 20 milliGRAM(s) Oral at bedtime  senna 2 Tablet(s) Oral at bedtime  sodium bicarbonate 1300 milliGRAM(s) Oral three times a day  tacrolimus ER Tablet (ENVARSUS XR) 2 milliGRAM(s) Oral <User Schedule>  tamsulosin 0.4 milliGRAM(s) Oral at bedtime  valGANciclovir 450 milliGRAM(s) Oral <User Schedule>    MEDICATIONS  (PRN):  dextrose Oral Gel 15 Gram(s) Oral once PRN Blood Glucose LESS THAN 70 milliGRAM(s)/deciliter  ondansetron Injectable 4 milliGRAM(s) IV Push once PRN Nausea and/or Vomiting      PAST MEDICAL & SURGICAL HISTORY:  HTN (hypertension)      PKD (polycystic kidney disease)      HLD (hyperlipidemia)      S/p cadaver renal transplant          Vital Signs Last 24 Hrs  T(C): 36.4 (05 May 2025 09:00), Max: 36.8 (05 May 2025 07:25)  T(F): 97.6 (05 May 2025 09:00), Max: 98.3 (05 May 2025 07:25)  HR: 88 (05 May 2025 11:00) (84 - 95)  BP: 131/63 (05 May 2025 11:00) (128/64 - 146/74)  BP(mean): 90 (05 May 2025 11:00) (89 - 100)  RR: 16 (05 May 2025 11:00) (15 - 20)  SpO2: 95% (05 May 2025 11:00) (92% - 99%)    Parameters below as of 05 May 2025 11:00  Patient On (Oxygen Delivery Method): room air        I&O's Summary    04 May 2025 07:01  -  05 May 2025 07:00  --------------------------------------------------------  IN: 480 mL / OUT: 1572 mL / NET: -1092 mL                              11.7   14.29 )-----------( 297      ( 05 May 2025 07:15 )             33.0     05-05    134[L]  |  103  |  89[H]  ----------------------------<  177[H]  4.4   |  13[L]  |  3.60[H]    Ca    9.0      05 May 2025 06:59  Phos  5.1     05-05  Mg     2.0     05-05    TPro  6.3  /  Alb  4.0  /  TBili  0.2  /  DBili  x   /  AST  15  /  ALT  9[L]  /  AlkPhos  46  05-05    Tacrolimus (), Serum: 7.2 ng/mL (05-05 @ 07:00)        Culture - Urine (collected 05-03-25 @ 07:05)  Source: Clean Catch Clean Catch (Midstream)  Final Report (05-04-25 @ 07:49):    <10,000 CFU/mL Normal Urogenital Stephenie    Culture - Blood (collected 05-02-25 @ 19:45)  Source: Blood Blood  Preliminary Report (05-05-25 @ 01:02):    No growth at 48 Hours    Culture - Blood (collected 05-02-25 @ 19:30)  Source: Blood Blood  Preliminary Report (05-05-25 @ 01:02):    No growth at 48 Hours              Review of systems  Gen: No weight changes, fatigue, fevers/chills, weakness  Skin: No rashes  Head/Eyes/Ears/Mouth: No headache; Normal hearing; Normal vision w/o blurriness; No sinus pain/discomfort, sore throat  Respiratory: No dyspnea, cough, wheezing, hemoptysis  CV: No chest pain, PND, orthopnea  GI:  denies diarrhea, constipation, nausea, vomiting, melena, hematochezia  : No increased frequency, dysuria, hematuria, nocturia  MSK: No joint pain/swelling; no back pain; no edema  Neuro: No dizziness/lightheadedness, weakness, seizures, numbness, tingling  Heme: No easy bruising or bleeding  Endo: No heat/cold intolerance  Psych: No significant nervousness, anxiety, stress, depression  All other systems were reviewed and are negative, except as noted.      PHYSICAL EXAM:  Constitutional: Well developed / well nourished  Eyes: Anicteric, PERRLA  ENMT: nc/at  Neck: supple  Respiratory: CTA B/L  Cardiovascular: RRR  Gastrointestinal: Soft, non distended, well healed scar  Genitourinary: Voiding spontaneously  Extremities: SCD's in place and working bilaterally  Vascular: Palpable dp pulses bilaterally  Neurological: A&O x3  Skin: no rashes, ulcerations or lesions;  Musculoskeletal: Moving all extremities  Psychiatric: Responsive     Transplant Surgery - Multidisciplinary Rounds  --------------------------------------------------------------   Tx Date:  DDRT 12/5/2024    Present: Patient seen and examined with multidisciplinary Transplant team including Surgeon Dr. Atkinson, Nephrologist: Dr. Downs, ACP, EROS Biswas, Student Jimena, Nutritionist,  and bedside RN during AM rounds.  Disciplines not in attendance will be notified of the plan.     HPI: 79 male with PMHx HTN, left inguinal hernia repair, HLD, 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 post op had episode of aflutter on tele w/ 1st degree av block, EKG normal cardiology recommended continuing amlodipine and labetalol. Patient presents 5/2/25 as a direct admit, was sent from outpatient with an LISA Cr-3.06 in outpatient labs, baseline Cr of 1.2-1.7    Interval Events:  - Afebrile, VSS  - plan for bx  - Cr uptrend to 3.42  - O/N: tylenol x 1      Immunosupression:  Maintenance: Envarsus by level/Myfortic 360 BID/Steroid Pulse  Ongoing monitoring for signs of rejection     Potential Discharge date: TBD  Education:  Medications  Plan of care:  See Below        MEDICATIONS  (STANDING):  allopurinol 300 milliGRAM(s) Oral daily  amLODIPine   Tablet 10 milliGRAM(s) Oral daily  dextrose 5%. 1000 milliLiter(s) (50 mL/Hr) IV Continuous <Continuous>  dextrose 5%. 1000 milliLiter(s) (100 mL/Hr) IV Continuous <Continuous>  dextrose 50% Injectable 25 Gram(s) IV Push once  dextrose 50% Injectable 12.5 Gram(s) IV Push once  dextrose 50% Injectable 25 Gram(s) IV Push once  famotidine    Tablet 20 milliGRAM(s) Oral daily  glucagon  Injectable 1 milliGRAM(s) IntraMuscular once  insulin glargine Injectable (LANTUS) 3 Unit(s) SubCutaneous at bedtime  insulin lispro (ADMELOG) corrective regimen sliding scale   SubCutaneous three times a day before meals  labetalol 100 milliGRAM(s) Oral three times a day  melatonin 5 milliGRAM(s) Oral at bedtime  mycophenolic acid  milliGRAM(s) Oral <User Schedule>  nystatin    Suspension 330783 Unit(s) Oral four times a day  rosuvastatin 20 milliGRAM(s) Oral at bedtime  senna 2 Tablet(s) Oral at bedtime  sodium bicarbonate 1300 milliGRAM(s) Oral three times a day  tacrolimus ER Tablet (ENVARSUS XR) 2 milliGRAM(s) Oral <User Schedule>  tamsulosin 0.4 milliGRAM(s) Oral at bedtime  valGANciclovir 450 milliGRAM(s) Oral <User Schedule>    MEDICATIONS  (PRN):  dextrose Oral Gel 15 Gram(s) Oral once PRN Blood Glucose LESS THAN 70 milliGRAM(s)/deciliter  ondansetron Injectable 4 milliGRAM(s) IV Push once PRN Nausea and/or Vomiting      PAST MEDICAL & SURGICAL HISTORY:  HTN (hypertension)      PKD (polycystic kidney disease)      HLD (hyperlipidemia)      S/p cadaver renal transplant          Vital Signs Last 24 Hrs  T(C): 36.4 (05 May 2025 09:00), Max: 36.8 (05 May 2025 07:25)  T(F): 97.6 (05 May 2025 09:00), Max: 98.3 (05 May 2025 07:25)  HR: 88 (05 May 2025 11:00) (84 - 95)  BP: 131/63 (05 May 2025 11:00) (128/64 - 146/74)  BP(mean): 90 (05 May 2025 11:00) (89 - 100)  RR: 16 (05 May 2025 11:00) (15 - 20)  SpO2: 95% (05 May 2025 11:00) (92% - 99%)    Parameters below as of 05 May 2025 11:00  Patient On (Oxygen Delivery Method): room air        I&O's Summary    04 May 2025 07:01  -  05 May 2025 07:00  --------------------------------------------------------  IN: 480 mL / OUT: 1572 mL / NET: -1092 mL                              11.7   14.29 )-----------( 297      ( 05 May 2025 07:15 )             33.0     05-05    134[L]  |  103  |  89[H]  ----------------------------<  177[H]  4.4   |  13[L]  |  3.60[H]    Ca    9.0      05 May 2025 06:59  Phos  5.1     05-05  Mg     2.0     05-05    TPro  6.3  /  Alb  4.0  /  TBili  0.2  /  DBili  x   /  AST  15  /  ALT  9[L]  /  AlkPhos  46  05-05    Tacrolimus (), Serum: 7.2 ng/mL (05-05 @ 07:00)        Culture - Urine (collected 05-03-25 @ 07:05)  Source: Clean Catch Clean Catch (Midstream)  Final Report (05-04-25 @ 07:49):    <10,000 CFU/mL Normal Urogenital Stephenie    Culture - Blood (collected 05-02-25 @ 19:45)  Source: Blood Blood  Preliminary Report (05-05-25 @ 01:02):    No growth at 48 Hours    Culture - Blood (collected 05-02-25 @ 19:30)  Source: Blood Blood  Preliminary Report (05-05-25 @ 01:02):    No growth at 48 Hours              Review of systems  Gen: No weight changes, fatigue, fevers/chills, weakness  Skin: No rashes  Head/Eyes/Ears/Mouth: No headache; Normal hearing; Normal vision w/o blurriness; No sinus pain/discomfort, sore throat  Respiratory: No dyspnea, cough, wheezing, hemoptysis  CV: No chest pain, PND, orthopnea  GI:  denies diarrhea, constipation, nausea, vomiting, melena, hematochezia  : No increased frequency, dysuria, hematuria, nocturia  MSK: No joint pain/swelling; no back pain; no edema  Neuro: No dizziness/lightheadedness, weakness, seizures, numbness, tingling  Heme: No easy bruising or bleeding  Endo: No heat/cold intolerance  Psych: No significant nervousness, anxiety, stress, depression  All other systems were reviewed and are negative, except as noted.      PHYSICAL EXAM:  Constitutional: Well developed / well nourished  Eyes: Anicteric, PERRLA  ENMT: nc/at  Neck: supple  Respiratory: CTA B/L  Cardiovascular: RRR  Gastrointestinal: Soft, non distended, well healed scar  Genitourinary: Voiding spontaneously  Extremities: SCD's in place and working bilaterally  Vascular: Palpable dp pulses bilaterally  Neurological: A&O x3  Skin: no rashes, ulcerations or lesions;  Musculoskeletal: Moving all extremities  Psychiatric: Responsive

## 2025-05-05 NOTE — PROGRESS NOTE ADULT - ASSESSMENT
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 post op had episode of aflutter on tele w/ 1st degree av block, EKG normal cardiology recommended continuing amlodipine and labetalol. Patient presents 5/2/25 as a direct admit, was sent from outpatient with an LISA       #DDRT 12/5/24 now LISA, r/o rejection   - Cr-3.06 in outpatient labs, baseline Cr of 1.2-1.7. UA neg, UCX neg  - Renal US: patent vasc. complex cystic lesion 1.7cm, enlarged prostate   - DSA neg  - Pulse steroids (stared 5/2) and follow kidney biopsy (done 5/5)  - SCO2 13, increase bicarb from BID to TID  - F/u SIFE    IS    - Tacro by level, Myfortic 360mg BID, Pulse done for c/f rejection  - PPX: valcyte TIW, pepcid  - holding bactrim i/s/o LISA       #HTN   - Cw Labetalol, Amlodipine      Wesly Green  Nephrology Fellow  Feel free to contact me on TEAMS  After 5 pm and on weekends please contact the on-call Fellow.

## 2025-05-05 NOTE — PROGRESS NOTE ADULT - ASSESSMENT
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 post op had episode of aflutter on tele w/ 1st degree av block, EKG normal cardiology recommended continuing amlodipine and labetalol. Patient presents 5/2/25 as a direct admit, was sent from outpatient with an LISA Cr-3.06 in outpatient labs, baseline Cr of 1.2-1.7.    [] LISA/ S/P DDRT 12/5/24  - Renal US: patent vasc. complex cystic lesion 1.7cm, enlarged prostate   - bowel regimen  - Pulse steroids        - 5/2 - Solumed 500mg,        - 5/3 - Solumed 250mg,        - 5/4 - Solumed 250mg       - 5/5 - Solumed 125mg  - increase bicarb from BID to TID  - DSA neg  - UA neg, UCX neg  - f/u biopsy done on 5/5/2025  - f/u renal doppler 5/5/25  - f/u CT A/P noncontrast 5/5/25  - f/u Post-void bladder scan  - f/u immunofixation    [] Immuno:     - Envarsus by level, Myfortic 360mg BID, Pulse done  - PPX: Valcyte changed from QD to TIW, Pepcid  - d/c Bactrim    [] DVT PPx  - held SQH d/t bx today    [] HTN (Hypertension).     - Monitor bP.   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 post op had episode of aflutter on tele w/ 1st degree av block, EKG normal cardiology recommended continuing amlodipine and labetalol. Patient presents 5/2/25 as a direct admit, was sent from outpatient with an LISA Cr-3.06 in outpatient labs, baseline Cr of 1.2-1.7.    [] LISA/ S/P DDRT 12/5/24  - Renal US: patent vasc. complex cystic lesion 1.7cm, enlarged prostate   - bowel regimen  - Pulse steroids        - 5/2 - Solumed 500mg,        - 5/3 - Solumed 250mg,        - 5/4 - Solumed 250mg       - 5/5 - Solumed 125mg  - increase bicarb from BID to TID  - DSA neg  - UA neg, UCX neg  - f/u biopsy done on 5/5/2025  - f/u renal doppler 5/5/25  - f/u CT A/P noncontrast 5/5/25  - f/u Post-void bladder scan  - f/u immunofixation    [] Immuno:     - Envarsus by level, Myfortic 360mg BID, Pulse done  - PPX: Valcyte from QD to TIW, Pepcid  - d/c Bactrim    [] DVT PPx  - SQH held d/t bx today    [] HTN (Hypertension).     - Monitor bP.   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 post op had episode of aflutter on tele w/ 1st degree av block, EKG normal cardiology recommended continuing amlodipine and labetalol. Patient presents 5/2/25 as a direct admit, was sent from outpatient with an LISA Cr-3.06 in outpatient labs, baseline Cr of 1.2-1.7.    [] LISA/ S/P DDRT 12/5/24  - Renal US: patent vasc. complex cystic lesion 1.7cm, enlarged prostate   - bowel regimen  - Pulse steroids        - 5/2 - Solumed 500mg,        - 5/3 - Solumed 250mg,        - 5/4 - Solumed 250mg       - 5/5 - Solumed 125mg  - increase bicarb from BID to TID  - DSA neg  - UA neg, UCX neg  - f/u biopsy path 5/5  - repeat renal doppler 5/5  - CT A/P noncon 5/5  - f/u Post-void bladder scan  - f/u immunofixation    [] Immuno:     - FK by level, Myfortic 360mg BID, Pulse done  - PPX: valcyte TIW, pepcid  - holding bactrim i/s/o LISA     [] DVT PPx  - SQH held d/t bx today    [] HTN (Hypertension).     - Labetalol 100mg TID, Amlodipine 5mg qd

## 2025-05-05 NOTE — PROCEDURE NOTE - PROCEDURE FINDINGS AND DETAILS
Successful right transplant renal biopsy with multiple samples obtained and deemed to be adequate by the cytopathology technologist. Gelfoam slurry was injected in the tract for hemostasis. Dry sterile dressing was applied.

## 2025-05-06 LAB
ALBUMIN SERPL ELPH-MCNC: 4.2 G/DL — SIGNIFICANT CHANGE UP (ref 3.3–5)
ALP SERPL-CCNC: 43 U/L — SIGNIFICANT CHANGE UP (ref 40–120)
ALT FLD-CCNC: 11 U/L — SIGNIFICANT CHANGE UP (ref 10–45)
ANION GAP SERPL CALC-SCNC: 16 MMOL/L — SIGNIFICANT CHANGE UP (ref 5–17)
AST SERPL-CCNC: 10 U/L — SIGNIFICANT CHANGE UP (ref 10–40)
BASOPHILS # BLD AUTO: 0.01 K/UL — SIGNIFICANT CHANGE UP (ref 0–0.2)
BASOPHILS NFR BLD AUTO: 0.1 % — SIGNIFICANT CHANGE UP (ref 0–2)
BILIRUB SERPL-MCNC: 0.2 MG/DL — SIGNIFICANT CHANGE UP (ref 0.2–1.2)
BKV DNA SPEC QL NAA+PROBE: SIGNIFICANT CHANGE UP
BUN SERPL-MCNC: 96 MG/DL — HIGH (ref 7–23)
CALCIUM SERPL-MCNC: 8.9 MG/DL — SIGNIFICANT CHANGE UP (ref 8.4–10.5)
CHLORIDE SERPL-SCNC: 103 MMOL/L — SIGNIFICANT CHANGE UP (ref 96–108)
CO2 SERPL-SCNC: 15 MMOL/L — LOW (ref 22–31)
CREAT ?TM UR-MCNC: 60 MG/DL — SIGNIFICANT CHANGE UP
CREAT SERPL-MCNC: 3.61 MG/DL — HIGH (ref 0.5–1.3)
EBV DNA SERPL NAA+PROBE-ACNC: SIGNIFICANT CHANGE UP IU/ML
EBVPCR LOG: SIGNIFICANT CHANGE UP LOG10IU/ML
EGFR: 16 ML/MIN/1.73M2 — LOW
EGFR: 16 ML/MIN/1.73M2 — LOW
EOSINOPHIL # BLD AUTO: 0 K/UL — SIGNIFICANT CHANGE UP (ref 0–0.5)
EOSINOPHIL NFR BLD AUTO: 0 % — SIGNIFICANT CHANGE UP (ref 0–6)
GLUCOSE BLDC GLUCOMTR-MCNC: 155 MG/DL — HIGH (ref 70–99)
GLUCOSE BLDC GLUCOMTR-MCNC: 193 MG/DL — HIGH (ref 70–99)
GLUCOSE BLDC GLUCOMTR-MCNC: 214 MG/DL — HIGH (ref 70–99)
GLUCOSE BLDC GLUCOMTR-MCNC: 267 MG/DL — HIGH (ref 70–99)
GLUCOSE SERPL-MCNC: 181 MG/DL — HIGH (ref 70–99)
HCT VFR BLD CALC: 33 % — LOW (ref 39–50)
HGB BLD-MCNC: 11.7 G/DL — LOW (ref 13–17)
IMM GRANULOCYTES NFR BLD AUTO: 2.7 % — HIGH (ref 0–0.9)
INTERPRETATION SERPL IFE-IMP: SIGNIFICANT CHANGE UP
JCPYV DNA SPEC QL NAA+PROBE: SIGNIFICANT CHANGE UP
LYMPHOCYTES # BLD AUTO: 0.31 K/UL — LOW (ref 1–3.3)
LYMPHOCYTES # BLD AUTO: 2.3 % — LOW (ref 13–44)
MAGNESIUM SERPL-MCNC: 2.2 MG/DL — SIGNIFICANT CHANGE UP (ref 1.6–2.6)
MCHC RBC-ENTMCNC: 32.8 PG — SIGNIFICANT CHANGE UP (ref 27–34)
MCHC RBC-ENTMCNC: 35.5 G/DL — SIGNIFICANT CHANGE UP (ref 32–36)
MCV RBC AUTO: 92.4 FL — SIGNIFICANT CHANGE UP (ref 80–100)
MONOCYTES # BLD AUTO: 0.48 K/UL — SIGNIFICANT CHANGE UP (ref 0–0.9)
MONOCYTES NFR BLD AUTO: 3.5 % — SIGNIFICANT CHANGE UP (ref 2–14)
NEUTROPHILS # BLD AUTO: 12.43 K/UL — HIGH (ref 1.8–7.4)
NEUTROPHILS NFR BLD AUTO: 91.4 % — HIGH (ref 43–77)
NRBC BLD AUTO-RTO: 0 /100 WBCS — SIGNIFICANT CHANGE UP (ref 0–0)
OSMOLALITY UR: 431 MOS/KG — SIGNIFICANT CHANGE UP (ref 300–900)
PHOSPHATE SERPL-MCNC: 4.4 MG/DL — SIGNIFICANT CHANGE UP (ref 2.5–4.5)
PLATELET # BLD AUTO: 298 K/UL — SIGNIFICANT CHANGE UP (ref 150–400)
POTASSIUM SERPL-MCNC: 4 MMOL/L — SIGNIFICANT CHANGE UP (ref 3.5–5.3)
POTASSIUM SERPL-SCNC: 4 MMOL/L — SIGNIFICANT CHANGE UP (ref 3.5–5.3)
POTASSIUM UR-SCNC: 19 MMOL/L — SIGNIFICANT CHANGE UP
PROT ?TM UR-MCNC: 15 MG/DL — HIGH (ref 0–12)
PROT SERPL-MCNC: 6.3 G/DL — SIGNIFICANT CHANGE UP (ref 6–8.3)
PROT/CREAT UR-RTO: 0.2 RATIO — SIGNIFICANT CHANGE UP (ref 0–0.2)
RBC # BLD: 3.57 M/UL — LOW (ref 4.2–5.8)
RBC # FLD: 12.8 % — SIGNIFICANT CHANGE UP (ref 10.3–14.5)
SODIUM SERPL-SCNC: 134 MMOL/L — LOW (ref 135–145)
SODIUM UR-SCNC: 51 MMOL/L — SIGNIFICANT CHANGE UP
SPECIMEN SOURCE: SIGNIFICANT CHANGE UP
TACROLIMUS SERPL-MCNC: 7.7 NG/ML — SIGNIFICANT CHANGE UP
WBC # BLD: 13.6 K/UL — HIGH (ref 3.8–10.5)
WBC # FLD AUTO: 13.6 K/UL — HIGH (ref 3.8–10.5)

## 2025-05-06 PROCEDURE — 99232 SBSQ HOSP IP/OBS MODERATE 35: CPT | Mod: GC

## 2025-05-06 RX ORDER — AMLODIPINE BESYLATE 10 MG/1
5 TABLET ORAL DAILY
Refills: 0 | Status: DISCONTINUED | OUTPATIENT
Start: 2025-05-06 | End: 2025-05-07

## 2025-05-06 RX ORDER — PREDNISONE 20 MG/1
5 TABLET ORAL DAILY
Refills: 0 | Status: CANCELLED | OUTPATIENT
Start: 2025-05-10 | End: 2025-05-07

## 2025-05-06 RX ORDER — PREDNISONE 20 MG/1
TABLET ORAL
Refills: 0 | Status: DISCONTINUED | OUTPATIENT
Start: 2025-05-06 | End: 2025-05-07

## 2025-05-06 RX ORDER — ACETAMINOPHEN 500 MG/5ML
650 LIQUID (ML) ORAL ONCE
Refills: 0 | Status: COMPLETED | OUTPATIENT
Start: 2025-05-06 | End: 2025-05-06

## 2025-05-06 RX ORDER — BELATACEPT 250 MG/1
325 INJECTION, POWDER, LYOPHILIZED, FOR SOLUTION INTRAVENOUS ONCE
Refills: 0 | Status: COMPLETED | OUTPATIENT
Start: 2025-05-06 | End: 2025-05-06

## 2025-05-06 RX ORDER — PREDNISONE 20 MG/1
40 TABLET ORAL ONCE
Refills: 0 | Status: COMPLETED | OUTPATIENT
Start: 2025-05-06 | End: 2025-05-06

## 2025-05-06 RX ORDER — PREDNISONE 20 MG/1
10 TABLET ORAL ONCE
Refills: 0 | Status: DISCONTINUED | OUTPATIENT
Start: 2025-05-08 | End: 2025-05-07

## 2025-05-06 RX ORDER — PREDNISONE 20 MG/1
20 TABLET ORAL ONCE
Refills: 0 | Status: DISCONTINUED | OUTPATIENT
Start: 2025-05-07 | End: 2025-05-07

## 2025-05-06 RX ADMIN — Medication 300 MILLIGRAM(S): at 12:12

## 2025-05-06 RX ADMIN — Medication 650 MILLIGRAM(S): at 21:30

## 2025-05-06 RX ADMIN — Medication 500000 UNIT(S): at 06:15

## 2025-05-06 RX ADMIN — ROSUVASTATIN CALCIUM 20 MILLIGRAM(S): 20 TABLET, FILM COATED ORAL at 20:42

## 2025-05-06 RX ADMIN — LABETALOL HYDROCHLORIDE 100 MILLIGRAM(S): 200 TABLET, FILM COATED ORAL at 12:30

## 2025-05-06 RX ADMIN — TAMSULOSIN HYDROCHLORIDE 0.4 MILLIGRAM(S): 0.4 CAPSULE ORAL at 20:41

## 2025-05-06 RX ADMIN — Medication 500000 UNIT(S): at 12:11

## 2025-05-06 RX ADMIN — INSULIN LISPRO 2: 100 INJECTION, SOLUTION INTRAVENOUS; SUBCUTANEOUS at 08:25

## 2025-05-06 RX ADMIN — Medication 1300 MILLIGRAM(S): at 12:30

## 2025-05-06 RX ADMIN — AMLODIPINE BESYLATE 5 MILLIGRAM(S): 10 TABLET ORAL at 08:29

## 2025-05-06 RX ADMIN — TACROLIMUS 2 MILLIGRAM(S): 0.5 CAPSULE ORAL at 08:26

## 2025-05-06 RX ADMIN — BELATACEPT 200 MILLIGRAM(S): 250 INJECTION, POWDER, LYOPHILIZED, FOR SOLUTION INTRAVENOUS at 17:16

## 2025-05-06 RX ADMIN — Medication 20 MILLIGRAM(S): at 12:11

## 2025-05-06 RX ADMIN — Medication 5 MILLIGRAM(S): at 20:43

## 2025-05-06 RX ADMIN — Medication 1300 MILLIGRAM(S): at 20:41

## 2025-05-06 RX ADMIN — LABETALOL HYDROCHLORIDE 100 MILLIGRAM(S): 200 TABLET, FILM COATED ORAL at 20:42

## 2025-05-06 RX ADMIN — INSULIN GLARGINE-YFGN 3 UNIT(S): 100 INJECTION, SOLUTION SUBCUTANEOUS at 20:42

## 2025-05-06 RX ADMIN — Medication 500000 UNIT(S): at 16:53

## 2025-05-06 RX ADMIN — Medication 650 MILLIGRAM(S): at 20:41

## 2025-05-06 RX ADMIN — Medication 1300 MILLIGRAM(S): at 06:14

## 2025-05-06 RX ADMIN — LABETALOL HYDROCHLORIDE 100 MILLIGRAM(S): 200 TABLET, FILM COATED ORAL at 06:17

## 2025-05-06 RX ADMIN — MYCOPHENOLIC ACID 360 MILLIGRAM(S): 360 TABLET, DELAYED RELEASE ORAL at 20:42

## 2025-05-06 RX ADMIN — MYCOPHENOLIC ACID 360 MILLIGRAM(S): 360 TABLET, DELAYED RELEASE ORAL at 08:26

## 2025-05-06 RX ADMIN — Medication 1000 MILLILITER(S): at 15:36

## 2025-05-06 RX ADMIN — INSULIN LISPRO 2: 100 INJECTION, SOLUTION INTRAVENOUS; SUBCUTANEOUS at 16:54

## 2025-05-06 RX ADMIN — INSULIN LISPRO 6: 100 INJECTION, SOLUTION INTRAVENOUS; SUBCUTANEOUS at 12:11

## 2025-05-06 RX ADMIN — PREDNISONE 40 MILLIGRAM(S): 20 TABLET ORAL at 16:53

## 2025-05-06 NOTE — PROGRESS NOTE ADULT - SUBJECTIVE AND OBJECTIVE BOX
[facility] DIVISION OF KIDNEY DISEASES AND HYPERTENSION   FOLLOW UP NOTE    --------------------------------------------------------------------------------  Chief Complaint:    24 hour events/subjective: Pt. was seen and examined today. Feels ok      PAST HISTORY  --------------------------------------------------------------------------------  No significant changes to PMH, PSH, FHx, SHx, unless otherwise noted    ALLERGIES & MEDICATIONS  --------------------------------------------------------------------------------  Allergies    No Known Allergies    Intolerances      Standing Inpatient Medications  allopurinol 300 milliGRAM(s) Oral daily  amLODIPine   Tablet 5 milliGRAM(s) Oral daily  dextrose 5%. 1000 milliLiter(s) IV Continuous <Continuous>  dextrose 5%. 1000 milliLiter(s) IV Continuous <Continuous>  dextrose 50% Injectable 25 Gram(s) IV Push once  dextrose 50% Injectable 12.5 Gram(s) IV Push once  dextrose 50% Injectable 25 Gram(s) IV Push once  famotidine    Tablet 20 milliGRAM(s) Oral daily  glucagon  Injectable 1 milliGRAM(s) IntraMuscular once  insulin glargine Injectable (LANTUS) 3 Unit(s) SubCutaneous at bedtime  insulin lispro (ADMELOG) corrective regimen sliding scale   SubCutaneous three times a day before meals  labetalol 100 milliGRAM(s) Oral three times a day  melatonin 5 milliGRAM(s) Oral at bedtime  mycophenolic acid  milliGRAM(s) Oral <User Schedule>  nystatin    Suspension 117988 Unit(s) Oral four times a day  rosuvastatin 20 milliGRAM(s) Oral at bedtime  senna 2 Tablet(s) Oral at bedtime  sodium bicarbonate 1300 milliGRAM(s) Oral three times a day  tacrolimus ER Tablet (ENVARSUS XR) 2 milliGRAM(s) Oral <User Schedule>  tamsulosin 0.4 milliGRAM(s) Oral at bedtime  valGANciclovir 450 milliGRAM(s) Oral <User Schedule>    PRN Inpatient Medications  dextrose Oral Gel 15 Gram(s) Oral once PRN  ondansetron Injectable 4 milliGRAM(s) IV Push once PRN      REVIEW OF SYSTEMS  --------------------------------------------------------------------------------  All other systems were reviewed and are negative, except as noted.    VITALS/PHYSICAL EXAM  --------------------------------------------------------------------------------  T(C): 36.7 (05-06-25 @ 09:00), Max: 37 (05-05-25 @ 13:20)  HR: 93 (05-06-25 @ 12:30) (85 - 102)  BP: 134/79 (05-06-25 @ 12:30) (103/50 - 151/73)  RR: 18 (05-06-25 @ 09:00) (18 - 20)  SpO2: 97% (05-06-25 @ 09:00) (93% - 100%)  Wt(kg): --  Height (cm): 167.6 (05-05-25 @ 07:57)  Weight (kg): 65.1 (05-05-25 @ 07:57)  BMI (kg/m2): 23.2 (05-05-25 @ 07:57)  BSA (m2): 1.74 (05-05-25 @ 07:57)      05-05-25 @ 07:01  -  05-06-25 @ 07:00  --------------------------------------------------------  IN: 360 mL / OUT: 1300 mL / NET: -940 mL    05-06-25 @ 07:01  -  05-06-25 @ 13:19  --------------------------------------------------------  IN: 480 mL / OUT: 200 mL / NET: 280 mL        Physical Exam:  	Gen: NAD  	HEENT: Anicteric  	Pulm: CTA B/L  	CV: S1S2+  	Abd: Soft, dressing at biopsy site           Transplant site: non tender, well healed surgical scar+  	Ext: No LE edema B/L  	Neuro: Awake  	Skin: Warm and dry        LABS/STUDIES  --------------------------------------------------------------------------------              11.7   13.60 >-----------<  298      [05-06-25 @ 06:46]              33.0     134  |  103  |  96  ----------------------------<  181      [05-06-25 @ 06:46]  4.0   |  15  |  3.61        Ca     8.9     [05-06-25 @ 06:46]      Mg     2.2     [05-06-25 @ 06:46]      Phos  4.4     [05-06-25 @ 06:46]    TPro  6.3  /  Alb  4.2  /  TBili  0.2  /  DBili  x   /  AST  10  /  ALT  11  /  AlkPhos  43  [05-06-25 @ 06:46]    PT/INR: PT 10.5 , INR 0.92       [05-05-25 @ 07:45]  PTT: 22.9       [05-05-25 @ 07:45]    Uric acid 4.0      [05-05-25 @ 06:59]    Creatinine Trend:  SCr 3.61 [05-06 @ 06:46]  SCr 3.60 [05-05 @ 06:59]  SCr 3.42 [05-04 @ 07:05]  SCr 2.84 [05-03 @ 07:13]    Urinalysis - [05-06-25 @ 06:46]      Color  / Appearance  / SG  / pH       Gluc 181 / Ketone   / Bili  / Urobili        Blood  / Protein  / Leuk Est  / Nitrite       RBC  / WBC  / Hyaline  / Gran  / Sq Epi  / Non Sq Epi  / Bacteria           TacrolimusTacrolimus (), Serum: 7.7 ng/mL (05-06 @ 06:43)  Tacrolimus (), Serum: 7.2 ng/mL (05-05 @ 07:00)  Tacrolimus (), Serum: 7.4 ng/mL (05-04 @ 07:11)  Tacrolimus (), Serum: 9.4 ng/mL (05-03 @ 07:11)    Cyclosporine  Sirolimus  Mycophenolate  BK PCR  CMV PCRCMVPCR Log: NotDetec Tpn27SZ/mL (05-02 @ 19:51)    Parvo PCR  EBV PCR

## 2025-05-06 NOTE — PROGRESS NOTE ADULT - SUBJECTIVE AND OBJECTIVE BOX
Transplant Surgery - Multidisciplinary Rounds  --------------------------------------------------------------   Tx Date:  DDRT 12/5/2024    Present: Patient seen and examined with multidisciplinary Transplant team including Surgeon Dr. Atkinson, Nephrologist: Dr. Downs, ACP, EROS Biswas, Student Jimena, Nutritionist,  and bedside RN during AM rounds.  Disciplines not in attendance will be notified of the plan.     HPI: 79 male with PMHx HTN, left inguinal hernia repair, HLD, 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 post op had episode of aflutter on tele w/ 1st degree av block, EKG normal cardiology recommended continuing amlodipine and labetalol. Patient presents 5/2/25 as a direct admit, was sent from outpatient with an LISA Cr-3.06 in outpatient labs, baseline Cr of 1.2-1.7    Interval Events:  - Afebrile, VSS  - bx today  - rpt Doppler: patent, no hydro, elevated RIs incr from prior   - CTAP: 1.5cm hyperdense lesion likely hemorrhagic cyst  - solu 125mg x1  - held bactrim d/t LISA  - valcyte TIW  - bicarb incr to TID       Immunosupression:  Maintenance: Envarsus by level/Myfortic 360 BID/Steroid Pulse  Ongoing monitoring for signs of rejection     Potential Discharge date: TBD  Education:  Medications  Plan of care:  See Below      TX DATA HERE        Review of systems  Gen: No weight changes, fatigue, fevers/chills, weakness  Skin: No rashes  Head/Eyes/Ears/Mouth: No headache; Normal hearing; Normal vision w/o blurriness; No sinus pain/discomfort, sore throat  Respiratory: No dyspnea, cough, wheezing, hemoptysis  CV: No chest pain, PND, orthopnea  GI:  denies diarrhea, constipation, nausea, vomiting, melena, hematochezia  : No increased frequency, dysuria, hematuria, nocturia  MSK: No joint pain/swelling; no back pain; no edema  Neuro: No dizziness/lightheadedness, weakness, seizures, numbness, tingling  Heme: No easy bruising or bleeding  Endo: No heat/cold intolerance  Psych: No significant nervousness, anxiety, stress, depression  All other systems were reviewed and are negative, except as noted.      PHYSICAL EXAM:  Constitutional: Well developed / well nourished  Eyes: Anicteric, PERRLA  ENMT: nc/at  Neck: supple  Respiratory: CTA B/L  Cardiovascular: RRR  Gastrointestinal: Soft, non distended, well healed scar  Genitourinary: Voiding spontaneously  Extremities: SCD's in place and working bilaterally  Vascular: Palpable dp pulses bilaterally  Neurological: A&O x3  Skin: no rashes, ulcerations or lesions;  Musculoskeletal: Moving all extremities  Psychiatric: Responsive     Transplant Surgery - Multidisciplinary Rounds  --------------------------------------------------------------   Tx Date:  DDRT 12/5/2024    Present: Patient seen and examined with multidisciplinary Transplant team including Surgeon Dr. Atkinson, Nephrologist: Dr. Downs, ACP, EROS Biswas, Student Jimena, Nutritionist,  and bedside RN during AM rounds.  Disciplines not in attendance will be notified of the plan.     HPI: 79 male with PMHx HTN, left inguinal hernia repair, HLD, 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 post op had episode of aflutter on tele w/ 1st degree av block, EKG normal cardiology recommended continuing amlodipine and labetalol. Patient presents 5/2/25 as a direct admit, was sent from outpatient with an LISA Cr-3.06 in outpatient labs, baseline Cr of 1.2-1.7    Interval Events:  - Afebrile, VSS  - rpt Doppler: patent, no hydro, elevated RIs incr from prior   - CTAP: 1.5cm hyperdense lesion likely hemorrhagic cyst  - held bactrim d/t LISA  - valcyte TIW  - bicarb incr to TID       Immunosupression:  Maintenance: Envarsus by level/Myfortic 360 BID/Steroid Pulse  Ongoing monitoring for signs of rejection     Potential Discharge date: TBD  Education:  Medications  Plan of care:  See Below      MEDICATIONS  (STANDING):  allopurinol 300 milliGRAM(s) Oral daily  amLODIPine   Tablet 5 milliGRAM(s) Oral daily  dextrose 5%. 1000 milliLiter(s) (50 mL/Hr) IV Continuous <Continuous>  dextrose 5%. 1000 milliLiter(s) (100 mL/Hr) IV Continuous <Continuous>  dextrose 50% Injectable 25 Gram(s) IV Push once  dextrose 50% Injectable 12.5 Gram(s) IV Push once  dextrose 50% Injectable 25 Gram(s) IV Push once  famotidine    Tablet 20 milliGRAM(s) Oral daily  glucagon  Injectable 1 milliGRAM(s) IntraMuscular once  insulin glargine Injectable (LANTUS) 3 Unit(s) SubCutaneous at bedtime  insulin lispro (ADMELOG) corrective regimen sliding scale   SubCutaneous three times a day before meals  labetalol 100 milliGRAM(s) Oral three times a day  melatonin 5 milliGRAM(s) Oral at bedtime  mycophenolic acid  milliGRAM(s) Oral <User Schedule>  nystatin    Suspension 779309 Unit(s) Oral four times a day  rosuvastatin 20 milliGRAM(s) Oral at bedtime  senna 2 Tablet(s) Oral at bedtime  sodium bicarbonate 1300 milliGRAM(s) Oral three times a day  tacrolimus ER Tablet (ENVARSUS XR) 2 milliGRAM(s) Oral <User Schedule>  tamsulosin 0.4 milliGRAM(s) Oral at bedtime  valGANciclovir 450 milliGRAM(s) Oral <User Schedule>    MEDICATIONS  (PRN):  dextrose Oral Gel 15 Gram(s) Oral once PRN Blood Glucose LESS THAN 70 milliGRAM(s)/deciliter  ondansetron Injectable 4 milliGRAM(s) IV Push once PRN Nausea and/or Vomiting      PAST MEDICAL & SURGICAL HISTORY:  HTN (hypertension)      PKD (polycystic kidney disease)      HLD (hyperlipidemia)      S/p cadaver renal transplant          Vital Signs Last 24 Hrs  T(C): 36.3 (06 May 2025 06:13), Max: 37 (05 May 2025 13:20)  T(F): 97.4 (06 May 2025 06:13), Max: 98.6 (05 May 2025 13:20)  HR: 102 (06 May 2025 06:13) (85 - 102)  BP: 129/68 (06 May 2025 06:13) (103/50 - 151/73)  BP(mean): 90 (05 May 2025 11:00) (89 - 100)  RR: 18 (06 May 2025 06:13) (15 - 20)  SpO2: 93% (06 May 2025 06:13) (92% - 100%)    Parameters below as of 06 May 2025 06:13  Patient On (Oxygen Delivery Method): room air        I&O's Summary    05 May 2025 07:01  -  06 May 2025 07:00  --------------------------------------------------------  IN: 360 mL / OUT: 1300 mL / NET: -940 mL                              11.7   13.60 )-----------( 298      ( 06 May 2025 06:46 )             33.0     05-06    134[L]  |  103  |  96[H]  ----------------------------<  181[H]  4.0   |  15[L]  |  3.61[H]    Ca    8.9      06 May 2025 06:46  Phos  4.4     05-06  Mg     2.2     05-06    TPro  6.3  /  Alb  4.2  /  TBili  0.2  /  DBili  x   /  AST  10  /  ALT  11  /  AlkPhos  43  05-06    Tacrolimus (), Serum: 7.2 ng/mL (05-05 @ 07:00)        Culture - Urine (collected 05-03-25 @ 07:05)  Source: Clean Catch Clean Catch (Midstream)  Final Report (05-04-25 @ 07:49):    <10,000 CFU/mL Normal Urogenital Stephenie    Culture - Blood (collected 05-02-25 @ 19:45)  Source: Blood Blood  Preliminary Report (05-06-25 @ 01:01):    No growth at 72 Hours    Culture - Blood (collected 05-02-25 @ 19:30)  Source: Blood Blood  Preliminary Report (05-06-25 @ 01:01):    No growth at 72 Hours                  Review of systems  Gen: No weight changes, fatigue, fevers/chills, weakness  Skin: No rashes  Head/Eyes/Ears/Mouth: No headache; Normal hearing; Normal vision w/o blurriness; No sinus pain/discomfort, sore throat  Respiratory: No dyspnea, cough, wheezing, hemoptysis  CV: No chest pain, PND, orthopnea  GI:  denies diarrhea, constipation, nausea, vomiting, melena, hematochezia  : No increased frequency, dysuria, hematuria, nocturia  MSK: No joint pain/swelling; no back pain; no edema  Neuro: No dizziness/lightheadedness, weakness, seizures, numbness, tingling  Heme: No easy bruising or bleeding  Endo: No heat/cold intolerance  Psych: No significant nervousness, anxiety, stress, depression  All other systems were reviewed and are negative, except as noted.      PHYSICAL EXAM:  Constitutional: Well developed / well nourished  Eyes: Anicteric, PERRLA  ENMT: nc/at  Neck: supple  Respiratory: CTA B/L  Cardiovascular: RRR  Gastrointestinal: Soft, non distended, well healed scar  Genitourinary: Voiding spontaneously  Extremities: SCD's in place and working bilaterally  Vascular: Palpable dp pulses bilaterally  Neurological: A&O x3  Skin: no rashes, ulcerations or lesions;  Musculoskeletal: Moving all extremities  Psychiatric: Responsive

## 2025-05-06 NOTE — PROGRESS NOTE ADULT - ASSESSMENT
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 post op had episode of aflutter on tele w/ 1st degree av block, EKG normal cardiology recommended continuing amlodipine and labetalol. Patient presents 5/2/25 as a direct admit, was sent from outpatient with an LISA       #DDRT 12/5/24 now LISA, r/o rejection   - Cr-3.06 in outpatient labs, baseline Cr of 1.2-1.7. UA neg, UCX neg  - Renal US: patent vasc. complex cystic lesion 1.7cm, enlarged prostate   - DSA neg  - Cr remains elevated at 3.6  - Pulse steroids (stared 5/2) and follow kidney biopsy (done 5/5)  - SCO2 15 (improving), cw bicarb 1300 TID  - F/u SIFE    IS    - Tacro by level, Myfortic 360mg BID, Pulse done for c/f rejection  - PPX: valcyte TIW, pepcid  - holding bactrim i/s/o LISA       #HTN   - Cw Labetalol, Amlodipine -adjust as needed      Wesly Green  Nephrology Fellow  Feel free to contact me on TEAMS  After 5 pm and on weekends please contact the on-call Fellow.

## 2025-05-06 NOTE — PROGRESS NOTE ADULT - ASSESSMENT
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 post op had episode of aflutter on tele w/ 1st degree av block, EKG normal cardiology recommended continuing amlodipine and labetalol. Patient presents 5/2/25 as a direct admit, was sent from outpatient with an LISA Cr-3.06 in outpatient labs, baseline Cr of 1.2-1.7.    [] LISA/ S/P DDRT 12/5/24  - Renal US: patent vasc. complex cystic lesion 1.7cm, enlarged prostate   - bowel regimen  - Pulse steroids        - 5/2 - Solumed 500mg,        - 5/3 - Solumed 250mg,        - 5/4 - Solumed 250mg       - 5/5 - Solumed 125mg  - increase bicarb from BID to TID  - DSA neg  - UA neg, UCX neg  - f/u biopsy path 5/5  - repeat renal doppler 5/5  - CT A/P noncon 5/5  - f/u Post-void bladder scan  - f/u immunofixation    [] Immuno:     - FK by level, Myfortic 360mg BID, Pulse done  - PPX: valcyte TIW, pepcid  - holding bactrim i/s/o LISA     [] DVT PPx  - SQH held d/t bx today    [] HTN (Hypertension).     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 post op had episode of aflutter on tele w/ 1st degree av block, EKG normal cardiology recommended continuing amlodipine and labetalol. Patient presents 5/2/25 as a direct admit, was sent from outpatient with an LISA Cr-3.06 in outpatient labs, baseline Cr of 1.2-1.7.    [] LISA/ S/P DDRT 12/5/24  - Renal US: patent vasc. complex cystic lesion 1.7cm, enlarged prostate   - bowel regimen  - Pulse steroids        - 5/2 - Solumed 500mg,        - 5/3 - Solumed 250mg,        - 5/4 - Solumed 250mg       - 5/5 - Solumed 125mg  - increase bicarb from BID to TID  - DSA neg  - UA neg, UCX neg  - biopsy done 5/5, pending path  - renal doppler 5/5: w/ persistent 1.7 cm cystic lesion  - CT A/P noncon 5/5 1.5 cm hyperdense lesion, likely hemorraghic cyst   - f/u Post-void bladder scan  - f/u immunofixation    [] Immuno:     - FK by level, Myfortic 360mg BID, Pulse done  - PPX: valcyte TIW, pepcid  - holding bactrim i/s/o LISA     [] DVT PPx  - SQH held for bx     [] HTN (Hypertension).     - Labetalol 100mg TID, Amlodipine 5mg qd

## 2025-05-06 NOTE — PROGRESS NOTE ADULT - SUBJECTIVE AND OBJECTIVE BOX
Interventional Radiology Follow-Up Note    This is a 80y Male s/p transplant renal bx on 5/5/25 in Interventional Radiology with Dr. Laureano.     S: Patient seen and examined @ bedside. No complaints offered.     Medication:     amLODIPine   Tablet: (05-05)  amLODIPine   Tablet: (05-06)  heparin   Injectable: (05-04)  labetalol: (05-06)  nystatin    Suspension: (05-06)  valGANciclovir: (05-05)    Vitals:   T(F): 98.1, Max: 98.6 (13:20)  HR: 93  BP: 134/79  RR: 18  SpO2: 97%    Physical Exam:  General: Nontoxic, in NAD, A&O x3.  Abdomen: Access site is well healing without evidence of erythema, redness, irritation or hematoma.     LABS:  WBC 13.60 / Hgb 11.7 / Hct 33.0 / Plt 298  Na 134 / K 4.0 / CO2 15 / Cl 103 / BUN 96 / Cr 3.61 / Glucose 181  ALT 11 / AST 10 / Alk Phos 43 / Tbili 0.2  Ptt -- / Pt -- / INR --      Assessment/Plan:  79 male with PMHx HTN, left inguinal hernia repair, HLD, 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 post op had episode of aflutter on tele w/ 1st degree av block, EKG normal cardiology recommended continuing amlodipine and labetalol. Patient presents 5/2/25 as a direct admit, was sent from outpatient with an LISA Cr-3.06 in outpatient labs, baseline Cr of 1.2-1.7. Pt is most recently s/p tx renal bx.    -f/u results of bx  -continue global management per primary team  -H/H is stable continue to monitor h/h; transfuse as needed  -trend vs/labs  -IR will follow up     Please call IR at extension [phone] with any questions, concerns, or issues regarding above.

## 2025-05-07 ENCOUNTER — TRANSCRIPTION ENCOUNTER (OUTPATIENT)
Age: 80
End: 2025-05-07

## 2025-05-07 VITALS
TEMPERATURE: 98 F | RESPIRATION RATE: 18 BRPM | OXYGEN SATURATION: 98 % | DIASTOLIC BLOOD PRESSURE: 82 MMHG | SYSTOLIC BLOOD PRESSURE: 157 MMHG | HEART RATE: 91 BPM

## 2025-05-07 LAB
24R-OH-CALCIDIOL SERPL-MCNC: 12 NG/ML — SIGNIFICANT CHANGE UP
ADD ON TEST-SPECIMEN IN LAB: SIGNIFICANT CHANGE UP
ALBUMIN SERPL ELPH-MCNC: 4.2 G/DL — SIGNIFICANT CHANGE UP (ref 3.3–5)
ALP SERPL-CCNC: 49 U/L — SIGNIFICANT CHANGE UP (ref 40–120)
ALT FLD-CCNC: 14 U/L — SIGNIFICANT CHANGE UP (ref 10–45)
ANION GAP SERPL CALC-SCNC: 16 MMOL/L — SIGNIFICANT CHANGE UP (ref 5–17)
AST SERPL-CCNC: 12 U/L — SIGNIFICANT CHANGE UP (ref 10–40)
BASOPHILS # BLD AUTO: 0.02 K/UL — SIGNIFICANT CHANGE UP (ref 0–0.2)
BASOPHILS NFR BLD AUTO: 0.2 % — SIGNIFICANT CHANGE UP (ref 0–2)
BILIRUB SERPL-MCNC: 0.3 MG/DL — SIGNIFICANT CHANGE UP (ref 0.2–1.2)
BLD GP AB SCN SERPL QL: NEGATIVE — SIGNIFICANT CHANGE UP
BUN SERPL-MCNC: 82 MG/DL — HIGH (ref 7–23)
CALCIUM SERPL-MCNC: 8.6 MG/DL — SIGNIFICANT CHANGE UP (ref 8.4–10.5)
CALCIUM SERPL-MCNC: 9 MG/DL — SIGNIFICANT CHANGE UP (ref 8.4–10.5)
CALCIUM UR-MCNC: 2.3 MG/DL — SIGNIFICANT CHANGE UP
CALCIUM/CREAT UR: 0 RATIO — SIGNIFICANT CHANGE UP (ref 0–0.2)
CHLORIDE SERPL-SCNC: 105 MMOL/L — SIGNIFICANT CHANGE UP (ref 96–108)
CO2 SERPL-SCNC: 17 MMOL/L — LOW (ref 22–31)
CREAT ?TM UR-MCNC: 59 MG/DL — SIGNIFICANT CHANGE UP
CREAT SERPL-MCNC: 2.49 MG/DL — HIGH (ref 0.5–1.3)
EGFR: 25 ML/MIN/1.73M2 — LOW
EGFR: 25 ML/MIN/1.73M2 — LOW
EOSINOPHIL # BLD AUTO: 0 K/UL — SIGNIFICANT CHANGE UP (ref 0–0.5)
EOSINOPHIL NFR BLD AUTO: 0 % — SIGNIFICANT CHANGE UP (ref 0–6)
GLUCOSE BLDC GLUCOMTR-MCNC: 171 MG/DL — HIGH (ref 70–99)
GLUCOSE BLDC GLUCOMTR-MCNC: 179 MG/DL — HIGH (ref 70–99)
GLUCOSE SERPL-MCNC: 166 MG/DL — HIGH (ref 70–99)
HCT VFR BLD CALC: 36.2 % — LOW (ref 39–50)
HGB BLD-MCNC: 12.8 G/DL — LOW (ref 13–17)
IMM GRANULOCYTES NFR BLD AUTO: 3.9 % — HIGH (ref 0–0.9)
LYMPHOCYTES # BLD AUTO: 0.39 K/UL — LOW (ref 1–3.3)
LYMPHOCYTES # BLD AUTO: 3.8 % — LOW (ref 13–44)
MAGNESIUM SERPL-MCNC: 2.2 MG/DL — SIGNIFICANT CHANGE UP (ref 1.6–2.6)
MCHC RBC-ENTMCNC: 32.8 PG — SIGNIFICANT CHANGE UP (ref 27–34)
MCHC RBC-ENTMCNC: 35.4 G/DL — SIGNIFICANT CHANGE UP (ref 32–36)
MCV RBC AUTO: 92.8 FL — SIGNIFICANT CHANGE UP (ref 80–100)
MONOCYTES # BLD AUTO: 0.36 K/UL — SIGNIFICANT CHANGE UP (ref 0–0.9)
MONOCYTES NFR BLD AUTO: 3.5 % — SIGNIFICANT CHANGE UP (ref 2–14)
NEUTROPHILS # BLD AUTO: 9.19 K/UL — HIGH (ref 1.8–7.4)
NEUTROPHILS NFR BLD AUTO: 88.6 % — HIGH (ref 43–77)
NRBC BLD AUTO-RTO: 0 /100 WBCS — SIGNIFICANT CHANGE UP (ref 0–0)
PHOSPHATE 24H UR-MCNC: 30.4 MG/DL — SIGNIFICANT CHANGE UP
PHOSPHATE SERPL-MCNC: 3.9 MG/DL — SIGNIFICANT CHANGE UP (ref 2.5–4.5)
PLATELET # BLD AUTO: 315 K/UL — SIGNIFICANT CHANGE UP (ref 150–400)
POTASSIUM SERPL-MCNC: 4 MMOL/L — SIGNIFICANT CHANGE UP (ref 3.5–5.3)
POTASSIUM SERPL-SCNC: 4 MMOL/L — SIGNIFICANT CHANGE UP (ref 3.5–5.3)
PROT SERPL-MCNC: 6.6 G/DL — SIGNIFICANT CHANGE UP (ref 6–8.3)
PTH-INTACT FLD-MCNC: 267 PG/ML — HIGH (ref 15–65)
RBC # BLD: 3.9 M/UL — LOW (ref 4.2–5.8)
RBC # FLD: 13 % — SIGNIFICANT CHANGE UP (ref 10.3–14.5)
RH IG SCN BLD-IMP: POSITIVE — SIGNIFICANT CHANGE UP
SODIUM SERPL-SCNC: 138 MMOL/L — SIGNIFICANT CHANGE UP (ref 135–145)
SURGICAL PATHOLOGY STUDY: SIGNIFICANT CHANGE UP
TACROLIMUS SERPL-MCNC: 12.2 NG/ML — SIGNIFICANT CHANGE UP
UUN UR-MCNC: 714 MG/DL — SIGNIFICANT CHANGE UP
VIT D25+D1,25 OH+D1,25 PNL SERPL-MCNC: 30 PG/ML — SIGNIFICANT CHANGE UP (ref 19.9–79.3)
WBC # BLD: 10.36 K/UL — SIGNIFICANT CHANGE UP (ref 3.8–10.5)
WBC # FLD AUTO: 10.36 K/UL — SIGNIFICANT CHANGE UP (ref 3.8–10.5)

## 2025-05-07 PROCEDURE — 99232 SBSQ HOSP IP/OBS MODERATE 35: CPT | Mod: GC

## 2025-05-07 PROCEDURE — 99232 SBSQ HOSP IP/OBS MODERATE 35: CPT

## 2025-05-07 RX ORDER — LABETALOL HYDROCHLORIDE 200 MG/1
1 TABLET, FILM COATED ORAL
Qty: 90 | Refills: 0
Start: 2025-05-07 | End: 2025-06-05

## 2025-05-07 RX ORDER — SULFAMETHOXAZOLE/TRIMETHOPRIM 800-160 MG
1 TABLET ORAL ONCE
Refills: 0 | Status: COMPLETED | OUTPATIENT
Start: 2025-05-07 | End: 2025-05-07

## 2025-05-07 RX ORDER — LABETALOL HYDROCHLORIDE 200 MG/1
1 TABLET, FILM COATED ORAL
Qty: 0 | Refills: 0 | DISCHARGE
Start: 2025-05-07

## 2025-05-07 RX ORDER — TACROLIMUS 0.5 MG/1
1 CAPSULE ORAL
Refills: 0 | Status: CANCELLED | OUTPATIENT
Start: 2025-05-09 | End: 2025-05-07

## 2025-05-07 RX ORDER — HEPARIN SODIUM 1000 [USP'U]/ML
5000 INJECTION INTRAVENOUS; SUBCUTANEOUS EVERY 12 HOURS
Refills: 0 | Status: DISCONTINUED | OUTPATIENT
Start: 2025-05-07 | End: 2025-05-07

## 2025-05-07 RX ORDER — SODIUM BICARBONATE 1 MEQ/ML
2 SYRINGE (ML) INTRAVENOUS
Qty: 0 | Refills: 0 | DISCHARGE
Start: 2025-05-07

## 2025-05-07 RX ORDER — SODIUM BICARBONATE 1 MEQ/ML
2 SYRINGE (ML) INTRAVENOUS
Qty: 180 | Refills: 0
Start: 2025-05-07 | End: 2025-06-05

## 2025-05-07 RX ORDER — ROSUVASTATIN CALCIUM 20 MG/1
1 TABLET, FILM COATED ORAL
Qty: 0 | Refills: 0 | DISCHARGE
Start: 2025-05-07

## 2025-05-07 RX ORDER — AMLODIPINE BESYLATE 10 MG/1
1 TABLET ORAL
Qty: 0 | Refills: 0 | DISCHARGE
Start: 2025-05-07

## 2025-05-07 RX ORDER — SENNA 187 MG
2 TABLET ORAL
Qty: 0 | Refills: 0 | DISCHARGE
Start: 2025-05-07

## 2025-05-07 RX ORDER — PREDNISONE 20 MG/1
5 TABLET ORAL
Qty: 0 | Refills: 0 | DISCHARGE
Start: 2025-05-07

## 2025-05-07 RX ORDER — MYCOPHENOLIC ACID 360 MG/1
1 TABLET, DELAYED RELEASE ORAL
Qty: 60 | Refills: 0
Start: 2025-05-07 | End: 2025-06-05

## 2025-05-07 RX ORDER — ATOVAQUONE 750 MG/5ML
10 SUSPENSION ORAL
Qty: 300 | Refills: 0
Start: 2025-05-07 | End: 2025-06-05

## 2025-05-07 RX ORDER — VALGANCICLOVIR 450 MG/1
1 TABLET, FILM COATED ORAL
Qty: 0 | Refills: 0 | DISCHARGE
Start: 2025-05-07

## 2025-05-07 RX ORDER — TAMSULOSIN HYDROCHLORIDE 0.4 MG/1
1 CAPSULE ORAL
Qty: 0 | Refills: 0 | DISCHARGE
Start: 2025-05-07

## 2025-05-07 RX ADMIN — Medication 300 MILLIGRAM(S): at 12:00

## 2025-05-07 RX ADMIN — TACROLIMUS 2 MILLIGRAM(S): 0.5 CAPSULE ORAL at 09:03

## 2025-05-07 RX ADMIN — VALGANCICLOVIR 450 MILLIGRAM(S): 450 TABLET, FILM COATED ORAL at 09:04

## 2025-05-07 RX ADMIN — Medication 500000 UNIT(S): at 12:01

## 2025-05-07 RX ADMIN — Medication 20 MILLIGRAM(S): at 12:00

## 2025-05-07 RX ADMIN — Medication 1000 MILLILITER(S): at 09:04

## 2025-05-07 RX ADMIN — Medication 500000 UNIT(S): at 06:01

## 2025-05-07 RX ADMIN — INSULIN LISPRO 2: 100 INJECTION, SOLUTION INTRAVENOUS; SUBCUTANEOUS at 09:04

## 2025-05-07 RX ADMIN — LABETALOL HYDROCHLORIDE 100 MILLIGRAM(S): 200 TABLET, FILM COATED ORAL at 06:00

## 2025-05-07 RX ADMIN — Medication 1300 MILLIGRAM(S): at 06:00

## 2025-05-07 RX ADMIN — Medication 1 TABLET(S): at 13:47

## 2025-05-07 RX ADMIN — INSULIN LISPRO 2: 100 INJECTION, SOLUTION INTRAVENOUS; SUBCUTANEOUS at 12:00

## 2025-05-07 RX ADMIN — AMLODIPINE BESYLATE 5 MILLIGRAM(S): 10 TABLET ORAL at 06:00

## 2025-05-07 RX ADMIN — MYCOPHENOLIC ACID 360 MILLIGRAM(S): 360 TABLET, DELAYED RELEASE ORAL at 09:04

## 2025-05-07 NOTE — PROGRESS NOTE ADULT - PROVIDER SPECIALTY LIST ADULT
Transplant Nephrology
Intervent Radiology
Transplant Surgery
Transplant Nephrology
Transplant Surgery
Transplant Nephrology

## 2025-05-07 NOTE — PROGRESS NOTE ADULT - SUBJECTIVE AND OBJECTIVE BOX
Transplant Surgery - Multidisciplinary Rounds  --------------------------------------------------------------   Tx Date:  DDRT 12/5/2024    Present: Patient seen and examined with multidisciplinary Transplant team including Surgeon Dr. Franz, Nephrologist: Dr. Downs, ACP, EROS Biswas, Nutritionist,  and bedside RN during AM rounds.  Disciplines not in attendance will be notified of the plan.     HPI: 79 male with PMHx HTN, left inguinal hernia repair, HLD, 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 post op had episode of aflutter on tele w/ 1st degree av block, EKG normal cardiology recommended continuing amlodipine and labetalol. Patient presents 5/2/25 as a direct admit, was sent from outpatient with an LISA Cr-3.06 in outpatient labs, baseline Cr of 1.2-1.7    Interval Events:  - Afebrile, VSS  - biopsy path with 30% ifta, no rejection  - no acute events ON   - bridge to gini    Immunosupression:  Maintenance: Envarsus by level/Myfortic 360 BID/pred taper  Ongoing monitoring for signs of rejection     Potential Discharge date: TBD  Education:  Medications  Plan of care:  See Below        MEDICATIONS  (STANDING):  allopurinol 300 milliGRAM(s) Oral daily  amLODIPine   Tablet 5 milliGRAM(s) Oral daily  dextrose 5%. 1000 milliLiter(s) (100 mL/Hr) IV Continuous <Continuous>  dextrose 5%. 1000 milliLiter(s) (50 mL/Hr) IV Continuous <Continuous>  dextrose 50% Injectable 25 Gram(s) IV Push once  dextrose 50% Injectable 12.5 Gram(s) IV Push once  dextrose 50% Injectable 25 Gram(s) IV Push once  famotidine    Tablet 20 milliGRAM(s) Oral daily  glucagon  Injectable 1 milliGRAM(s) IntraMuscular once  insulin glargine Injectable (LANTUS) 3 Unit(s) SubCutaneous at bedtime  insulin lispro (ADMELOG) corrective regimen sliding scale   SubCutaneous three times a day before meals  labetalol 100 milliGRAM(s) Oral three times a day  melatonin 5 milliGRAM(s) Oral at bedtime  mycophenolic acid  milliGRAM(s) Oral <User Schedule>  nystatin    Suspension 318517 Unit(s) Oral four times a day  predniSONE   Tablet   Oral   predniSONE   Tablet 20 milliGRAM(s) Oral once  rosuvastatin 20 milliGRAM(s) Oral at bedtime  senna 2 Tablet(s) Oral at bedtime  sodium bicarbonate 1300 milliGRAM(s) Oral three times a day  tacrolimus ER Tablet (ENVARSUS XR) 2 milliGRAM(s) Oral <User Schedule>  tamsulosin 0.4 milliGRAM(s) Oral at bedtime  valGANciclovir 450 milliGRAM(s) Oral <User Schedule>    MEDICATIONS  (PRN):  dextrose Oral Gel 15 Gram(s) Oral once PRN Blood Glucose LESS THAN 70 milliGRAM(s)/deciliter  ondansetron Injectable 4 milliGRAM(s) IV Push once PRN Nausea and/or Vomiting      PAST MEDICAL & SURGICAL HISTORY:  HTN (hypertension)      PKD (polycystic kidney disease)      HLD (hyperlipidemia)      S/p cadaver renal transplant          Vital Signs Last 24 Hrs  T(C): 36.6 (07 May 2025 05:52), Max: 36.7 (06 May 2025 09:00)  T(F): 97.8 (07 May 2025 05:52), Max: 98.1 (06 May 2025 09:00)  HR: 98 (07 May 2025 05:52) (90 - 98)  BP: 153/82 (07 May 2025 05:52) (113/65 - 155/76)  BP(mean): --  RR: 18 (07 May 2025 05:52) (18 - 18)  SpO2: 98% (07 May 2025 05:52) (96% - 98%)    Parameters below as of 07 May 2025 05:52  Patient On (Oxygen Delivery Method): room air        I&O's Summary    06 May 2025 07:01  -  07 May 2025 07:00  --------------------------------------------------------  IN: 1200 mL / OUT: 3000 mL / NET: -1800 mL                              12.8   10.36 )-----------( 315      ( 07 May 2025 07:00 )             36.2     05-07    138  |  105  |  82[H]  ----------------------------<  166[H]  4.0   |  17[L]  |  2.49[H]    Ca    9.0      07 May 2025 07:02  Phos  3.9     05-07  Mg     2.2     05-07    TPro  6.6  /  Alb  4.2  /  TBili  0.3  /  DBili  x   /  AST  12  /  ALT  14  /  AlkPhos  49  05-07    Tacrolimus (), Serum: 7.7 ng/mL (05-06 @ 06:43)        Culture - Urine (collected 05-03-25 @ 07:05)  Source: Clean Catch Clean Catch (Midstream)  Final Report (05-04-25 @ 07:49):    <10,000 CFU/mL Normal Urogenital Stephenie    Culture - Blood (collected 05-02-25 @ 19:45)  Source: Blood Blood  Preliminary Report (05-07-25 @ 01:01):    No growth at 4 days    Culture - Blood (collected 05-02-25 @ 19:30)  Source: Blood Blood  Preliminary Report (05-07-25 @ 01:01):    No growth at 4 days                  Review of systems  Gen: No weight changes, fatigue, fevers/chills, weakness  Skin: No rashes  Head/Eyes/Ears/Mouth: No headache; Normal hearing; Normal vision w/o blurriness; No sinus pain/discomfort, sore throat  Respiratory: No dyspnea, cough, wheezing, hemoptysis  CV: No chest pain, PND, orthopnea  GI:  denies diarrhea, constipation, nausea, vomiting, melena, hematochezia  : No increased frequency, dysuria, hematuria, nocturia  MSK: No joint pain/swelling; no back pain; no edema  Neuro: No dizziness/lightheadedness, weakness, seizures, numbness, tingling  Heme: No easy bruising or bleeding  Endo: No heat/cold intolerance  Psych: No significant nervousness, anxiety, stress, depression  All other systems were reviewed and are negative, except as noted.      PHYSICAL EXAM:  Constitutional: Well developed / well nourished  Eyes: Anicteric, PERRLA  ENMT: nc/at  Neck: supple  Respiratory: CTA B/L  Cardiovascular: RRR  Gastrointestinal: Soft, non distended, well healed scar  Genitourinary: Voiding spontaneously  Extremities: SCD's in place and working bilaterally  Vascular: Palpable dp pulses bilaterally  Neurological: A&O x3  Skin: no rashes, ulcerations or lesions;  Musculoskeletal: Moving all extremities  Psychiatric: Responsive

## 2025-05-07 NOTE — PROGRESS NOTE ADULT - REASON FOR ADMISSION
Possible kidney transplant

## 2025-05-07 NOTE — PROGRESS NOTE ADULT - ATTENDING COMMENTS
- LISA of transplanted Kidney- Cr plateaued today, DSA neg, no significant proteinuria, got pulse steroids so far for rejection- CT a/p non con and doppler USG with cystic lesion is a hemorrhagic cyst- serum immunofixation neg, preliminary biopsy results- No rejection. 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 Some Ca-Phos crystals, no eosinophils  -plan- get urine cr, lytes (taya calcium and phos and creatinine), given 1L NS IVF, start belatacept conversion (he is EBV positive), will keep tac trough 3-5 after belaconversion done  -HTN- BPs ok for now  -Immunosuppression- con't tac with lower goal as per noted above, was on MPA 180mg BID as outpatient due to age and decreasing weight- will determine further plans after biopsy results  -bicarb TID for metabolic acidosis .    - I spoke to patient by phone and explained the results and plan. He verbalized understanding and thanks.
- LISA of transplanted Kidney- Cr improved today, DSA neg, no significant proteinuria, got pulse steroids- CT a/p non con and doppler USG with cystic lesion is a hemorrhagic cyst- serum immunofixation neg, biopsy results- No rejection. 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 Some Ca-Phos crystals, no eosinophils  -s/p 1L NS IVF, belatacept conversion- got one dose yesterday (he is EBV positive), will keep tac trough 3-5 after belaconversion done, urine studies equivocal for signficant calciuria, phos pending- will check iPTH, Vitamin D 25OH and 1-25OH- can follow up as outpatient, will give 1 more L of NS IVF  -HTN- BPs ok for now  -Immunosuppression- con't tac with lower goal as per noted above, was on MPA 180mg BID as outpatient due to age and decreasing weight- con't MPA 360mg BID for now  -con't bicarb TID for metabolic acidosis  -ppx- give 1 dose bactrim and then mepron, con't valcyte, nystatin, famotidine    -planned for discharge today
- LISA of transplanted Kidney- Cr still rising, DSA neg, got pulse steroids so far for rejection- to continue- pending biopsy results- get CT a/p non con and doppler USG to look at known cystic lesion on kidney mid pole- reviewed with transplant surgery team  -HTN- BPs ok for now  -Immunosuppression- con't tac with goal 8-10, was on MPA 180mg BID as outpatient due to age and decreasing weight- will determine further plans after biopsy results  -bicarb TID for metabolic acidosis

## 2025-05-07 NOTE — DISCHARGE NOTE PROVIDER - NSDCMRMEDTOKEN_GEN_ALL_CORE_FT
allopurinol 300 mg oral tablet: 1 tab(s) orally once a day  amLODIPine 5 mg oral tablet: 1 tab(s) orally once a day  famotidine 20 mg oral tablet: 1 tab(s) orally once a day  labetalol 100 mg oral tablet: 1 tab(s) orally 3 times a day  Mepron 750 mg/5 mL oral suspension: 10 milliliter(s) orally once a day  Myfortic 360 mg oral delayed release tablet: 1 tab(s) orally 2 times a day  nystatin 100,000 units/mL oral suspension: 5 milliliter(s) orally 4 times a day  predniSONE: 5 milligram(s) once a day please take 2 tabs on 5/8 and then 1 tab daily from 5/9 onward  rosuvastatin 20 mg oral tablet: 1 tab(s) orally once a day (at bedtime)  senna leaf extract oral tablet: 2 tab(s) orally once a day (at bedtime)  sodium bicarbonate 650 mg oral tablet: 2 tab(s) orally 3 times a day  tacrolimus 1 mg oral tablet, extended release: 1 tab(s) orally once a day please start on 5/9  tamsulosin 0.4 mg oral capsule: 1 cap(s) orally once a day (at bedtime)  valGANciclovir 450 mg oral tablet: 1 tab(s) orally Monday, Wednesday, and Friday

## 2025-05-07 NOTE — PROGRESS NOTE ADULT - SUBJECTIVE AND OBJECTIVE BOX
[facility] DIVISION OF KIDNEY DISEASES AND HYPERTENSION   FOLLOW UP NOTE    --------------------------------------------------------------------------------  Chief Complaint:    24 hour events/subjective: Pt. was seen and examined today. Feels ok      PAST HISTORY  --------------------------------------------------------------------------------  No significant changes to PMH, PSH, FHx, SHx, unless otherwise noted    ALLERGIES & MEDICATIONS  --------------------------------------------------------------------------------  Allergies    No Known Allergies    Intolerances      Standing Inpatient Medications  allopurinol 300 milliGRAM(s) Oral daily  amLODIPine   Tablet 5 milliGRAM(s) Oral daily  dextrose 5%. 1000 milliLiter(s) IV Continuous <Continuous>  dextrose 5%. 1000 milliLiter(s) IV Continuous <Continuous>  dextrose 50% Injectable 25 Gram(s) IV Push once  dextrose 50% Injectable 12.5 Gram(s) IV Push once  dextrose 50% Injectable 25 Gram(s) IV Push once  famotidine    Tablet 20 milliGRAM(s) Oral daily  glucagon  Injectable 1 milliGRAM(s) IntraMuscular once  heparin   Injectable 5000 Unit(s) SubCutaneous every 12 hours  insulin glargine Injectable (LANTUS) 3 Unit(s) SubCutaneous at bedtime  insulin lispro (ADMELOG) corrective regimen sliding scale   SubCutaneous three times a day before meals  labetalol 100 milliGRAM(s) Oral three times a day  melatonin 5 milliGRAM(s) Oral at bedtime  mycophenolic acid  milliGRAM(s) Oral <User Schedule>  nystatin    Suspension 026584 Unit(s) Oral four times a day  predniSONE   Tablet 20 milliGRAM(s) Oral once  predniSONE   Tablet   Oral   rosuvastatin 20 milliGRAM(s) Oral at bedtime  senna 2 Tablet(s) Oral at bedtime  sodium bicarbonate 1300 milliGRAM(s) Oral three times a day  tamsulosin 0.4 milliGRAM(s) Oral at bedtime  valGANciclovir 450 milliGRAM(s) Oral <User Schedule>    PRN Inpatient Medications  dextrose Oral Gel 15 Gram(s) Oral once PRN  ondansetron Injectable 4 milliGRAM(s) IV Push once PRN      REVIEW OF SYSTEMS  --------------------------------------------------------------------------------  Gen: No fevers/chills  Head/Eyes/Ears: No HA   Respiratory: No dyspnea, cough  CV: No chest pain  GI: No abdominal pain, diarrhea  : No dysuria, hematuria  MSK: No  edema  Skin: No rashes  Heme: No easy bruising or bleeding    All other systems were reviewed and are negative, except as noted.    VITALS/PHYSICAL EXAM  --------------------------------------------------------------------------------  T(C): 36.4 (05-07-25 @ 09:00), Max: 36.7 (05-06-25 @ 20:30)  HR: 87 (05-07-25 @ 09:00) (87 - 98)  BP: 156/63 (05-07-25 @ 09:00) (113/65 - 156/63)  RR: 18 (05-07-25 @ 09:00) (18 - 18)  SpO2: 99% (05-07-25 @ 09:00) (96% - 99%)  Wt(kg): --        05-06-25 @ 07:01  -  05-07-25 @ 07:00  --------------------------------------------------------  IN: 1200 mL / OUT: 3000 mL / NET: -1800 mL        Physical Exam:  	Gen: NAD  	HEENT: Anicteric  	Pulm: CTA B/L  	CV: S1S2+  	Abd: Soft, dressing at biopsy site           Transplant site: non tender, well healed surgical scar+  	Ext: No LE edema B/L  	Neuro: Awake  	Skin: Warm and dry        LABS/STUDIES  --------------------------------------------------------------------------------              12.8   10.36 >-----------<  315      [05-07-25 @ 07:00]              36.2     138  |  105  |  82  ----------------------------<  166      [05-07-25 @ 07:02]  4.0   |  17  |  2.49        Ca     9.0     [05-07-25 @ 07:02]      Mg     2.2     [05-07-25 @ 07:02]      Phos  3.9     [05-07-25 @ 07:02]    TPro  6.6  /  Alb  4.2  /  TBili  0.3  /  DBili  x   /  AST  12  /  ALT  14  /  AlkPhos  49  [05-07-25 @ 07:02]          Creatinine Trend:  SCr 2.49 [05-07 @ 07:02]  SCr 3.61 [05-06 @ 06:46]  SCr 3.60 [05-05 @ 06:59]  SCr 3.42 [05-04 @ 07:05]  SCr 2.84 [05-03 @ 07:13]    Urinalysis - [05-07-25 @ 07:02]      Color  / Appearance  / SG  / pH       Gluc 166 / Ketone   / Bili  / Urobili        Blood  / Protein  / Leuk Est  / Nitrite       RBC  / WBC  / Hyaline  / Gran  / Sq Epi  / Non Sq Epi  / Bacteria     Urine Creatinine 60      [05-06-25 @ 15:51]  Urine Protein 15      [05-06-25 @ 15:51]  Urine Sodium 51      [05-06-25 @ 15:51]  Urine Urea Nitrogen 714      [05-06-25 @ 15:50]  Urine Potassium 19      [05-06-25 @ 15:51]  Urine Osmolality 431      [05-06-25 @ 15:51]      Immunofixation Serum:   No Monoclonal Band Identified      Reference Range: None Detected      [05-05-25 @ 07:11]    TacrolimusTacrolimus (), Serum: 12.2 ng/mL (05-07 @ 07:04)  Tacrolimus (), Serum: 7.7 ng/mL (05-06 @ 06:43)  Tacrolimus (), Serum: 7.2 ng/mL (05-05 @ 07:00)  Tacrolimus (), Serum: 7.4 ng/mL (05-04 @ 07:11)    Cyclosporine  Sirolimus  Mycophenolate  BK PCR  CMV PCRCMVPCR Log: NotDetec [id number]/mL (05-02 @ 19:51)    Parvo PCR  EBV PCR

## 2025-05-07 NOTE — DISCHARGE NOTE PROVIDER - NSDCFUSCHEDAPPT_GEN_ALL_CORE_FT
Drew Justin  [facility] Physician Formerly [facility], [facility]  NEPHRO [address] [place] [address]  Scheduled Appointment: 06/02/2025

## 2025-05-07 NOTE — DISCHARGE NOTE PROVIDER - CARE PROVIDER_API CALL
Drew Justin  Nephrology  [address] [place] [id number]  Phone: [phone]  Fax: [phone]  Follow Up Time:

## 2025-05-07 NOTE — PROGRESS NOTE ADULT - ASSESSMENT
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 post op had episode of aflutter on tele w/ 1st degree av block, EKG normal cardiology recommended continuing amlodipine and labetalol. Patient presents 5/2/25 as a direct admit, was sent from outpatient with an LISA Cr-3.06 in outpatient labs, baseline Cr of 1.2-1.7.    [] LISA/ S/P DDRT 12/5/24  - Renal US: patent vasc. complex cystic lesion 1.7cm, enlarged prostate   - bowel regimen  - Pulse steroids        - 5/2 - Solumed 500mg,        - 5/3 - Solumed 250mg,        - 5/4 - Solumed 250mg       - 5/5 - Solumed 125       - 5/6: pred taper   - DSA neg  - UA neg, UCX neg  - biopsy done 5/5, w/ 30% IFTA  - renal doppler 5/5: w/ persistent 1.7 cm cystic lesion  - CT A/P noncon 5/5 1.5 cm hyperdense lesion, likely hemorraghic cyst   -  immunofixation neg  - bridging to Belatacept      [] Immuno:     - FK by level, belatacept, Myfortic 360mg BID, steroid taper  - PPX: valcyte TIW, pepcid  - holding bactrim i/s/o LISA     [] DVT PPx  - SQH resumed    [] HTN (Hypertension).     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 post op had episode of aflutter on tele w/ 1st degree av block, EKG normal cardiology recommended continuing amlodipine and labetalol. Patient presents 5/2/25 as a direct admit, was sent from outpatient with an LISA Cr-3.06 in outpatient labs, baseline Cr of 1.2-1.7.    [] LISA/ S/P DDRT 12/5/24  - Renal US: patent vasc. complex cystic lesion 1.7cm, enlarged prostate   - bowel regimen  - Pulse steroids        - 5/2 - Solumed 500mg,        - 5/3 - Solumed 250mg,        - 5/4 - Solumed 250mg       - 5/5 - Solumed 125       - 5/6: pred taper   - DSA neg  - UA neg, UCX neg  - biopsy done 5/5, w/ 30% IFTA  - renal doppler 5/5: w/ persistent 1.7 cm cystic lesion  - CT A/P noncon 5/5 1.5 cm hyperdense lesion, likely hemorraghic cyst   -  immunofixation neg  - bridging to Belatacept  - LISA improving w/ fluids       [] Immuno:     - FK by level, belatacept, Myfortic 360mg BID, steroid taper  - PPX: valcyte TIW, pepcid  - holding bactrim i/s/o LISA     [] DVT PPx  - SQH resumed    [] HTN (Hypertension).     - Labetalol 100mg TID, Amlodipine 5mg qd

## 2025-05-07 NOTE — PROGRESS NOTE ADULT - ASSESSMENT
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 post op had episode of aflutter on tele w/ 1st degree av block, EKG normal cardiology recommended continuing amlodipine and labetalol. Patient presents 5/2/25 as a direct admit, was sent from outpatient with an LISA       #DDRT 12/5/24 now LISA, r/o rejection   - Cr-3.06 in outpatient labs, baseline Cr of 1.2-1.7. UA neg, UCX neg  - Renal US: patent vasc. complex cystic lesion 1.7cm, enlarged prostate   - DSA neg  - Pulse steroids (stared 5/2) and follow kidney biopsy (done 5/5) --> prelim neg for rejection, taper steroids   - Cr improving to 2.49 after IVF. Give 1L today as well  - SCO2 17 (improving), cw bicarb 1300 TID  - F/u SIFE    IS    - Tacro by level, Myfortic 360mg BID, Pulse done for c/f rejection, taper   - PPX: valcyte TIW, pepcid  - holding bactrim i/s/o LISA       #HTN   - Cw Labetalol, Amlodipine -adjust as needed      Wesly Green  Nephrology Fellow  Feel free to contact me on TEAMS  After 5 pm and on weekends please contact the on-call Fellow.   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 post op had episode of aflutter on tele w/ 1st degree av block, EKG normal cardiology recommended continuing amlodipine and labetalol. Patient presents 5/2/25 as a direct admit, was sent from outpatient with an LISA       #DDRT 12/5/24 now LISA, r/o rejection   - Cr-3.06 in outpatient labs, baseline Cr of 1.2-1.7. UA neg, UCX neg  - Renal US: patent vasc. complex cystic lesion 1.7cm, enlarged prostate   - DSA neg  - Pulse steroids (stared 5/2) and follow kidney biopsy (done 5/5) --> prelim neg for rejection, taper steroids   - Cr improving to 2.49 after IVF. Give 1L today as well. Bladder scan  - SCO2 17 (improving), cw bicarb 1300 TID  - F/u SIFE    IS    - Tacro by level, Myfortic 360mg BID, Pulse done for c/f rejection, taper. Decrease tacro, started on gini 5/6. Next dose outpatient in 2 weeks.   - PPX: valcyte TIW, pepcid  - holding bactrim i/s/o LISA       #HTN   - Cw Labetalol, Amlodipine -adjust as needed      Wesly Green  Nephrology Fellow  Feel free to contact me on TEAMS  After 5 pm and on weekends please contact the on-call Fellow.

## 2025-05-07 NOTE — DISCHARGE NOTE NURSING/CASE MANAGEMENT/SOCIAL WORK - FINANCIAL ASSISTANCE
[facility] provides services at a reduced cost to those who are determined to be eligible through [facility]’s financial assistance program. Information regarding [facility]’s financial assistance program can be found by going to [email or web address] or by calling [phone].

## 2025-05-07 NOTE — DISCHARGE NOTE NURSING/CASE MANAGEMENT/SOCIAL WORK - NSDCPEFALRISK_GEN_ALL_CORE
For information on Fall & Injury Prevention, visit: [email or web address] OR  [email or web address] OR  https://www.cdc.gov/steadi/patient.html

## 2025-05-07 NOTE — DISCHARGE NOTE PROVIDER - NSDCCPCAREPLAN_GEN_ALL_CORE_FT
PRINCIPAL DISCHARGE DIAGNOSIS  Diagnosis: LISA (acute kidney injury)  Assessment and Plan of Treatment: Follow up with transplant clinic in one week  Call  the Transplant team if  you experience any of the following:   [] Fever of 100.3F (38C) or above  [] Worsening abdominal pain, nausea or vomiting  [] Shortness of breath  [] Diffuculty with urinating such as burning, frequency or urgency, blood in urine

## 2025-05-07 NOTE — DISCHARGE NOTE PROVIDER - HOSPITAL COURSE
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 post op had episode of aflutter on tele w/ 1st degree av block, EKG normal cardiology recommended continuing amlodipine and labetalol. Patient presents 5/2/25 as a direct admit, was sent from outpatient with an LISA Cr-3.06 in outpatient labs, baseline Cr of 1.2-1.7.     [] LISA/ S/P DDRT 12/5/24  - Renal US: patent vasc. complex cystic lesion 1.7cm, enlarged prostate   - CT a/p non con 5/5: 1.5 cm hyperdense lesion, likely hemorragic cyst   - infectious workup negative   - pulse steroids, s/p 1.125 g solumedrol now on pred taper   - DSA 5/3: Negative  - IR bx 5/5: no rejection, mild 30% IFTA  - immunofixation neg  - Cr on discharge 2.49, improved with 1L bolus NS       [] Immuno  - FK 1 (holding dose tomorrow 5/8 i/s/o elevated FK trough on 5/7), Belatacept, started on admission, first dose 5/6, next dose____, myfortic 360 BID pred taper down to pred 5   - ppx: nystatin, valcyte TIW, bactrim held, switched to mepron     [] HTN  - here, labetalol 100 TID, amlodipine 5    79 male with PMHx HTN, left inguinal hernia repair, HLD, 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 post op had episode of aflutter on tele w/ 1st degree av block, EKG normal cardiology recommended continuing amlodipine and labetalol. Patient presents 5/2/25 as a direct admit, was sent from outpatient with an LISA Cr-3.06 in outpatient labs, baseline Cr of 1.2-1.7.     [] LISA/ S/P DDRT 12/5/24  - Renal US: patent vasc. complex cystic lesion 1.7cm, enlarged prostate   - CT a/p non con 5/5: 1.5 cm hyperdense lesion, likely hemorragic cyst   - infectious workup negative   - pulse steroids, s/p 1.125 g solumedrol now on pred taper   - DSA 5/3: Negative  - IR bx 5/5: no rejection, mild 30% IFTA  - immunofixation neg  - Cr on discharge 2.49, improved with 1L bolus NS       [] Immuno  - FK 1 (holding dose tomorrow 5/8 i/s/o elevated FK trough on 5/7) myfortic 360 BID pred taper down to pred 5   - Belatacept, started on admission, first dose 5/6, next dose 5/20, then 6/3, then 6/17, then 7/1, and then every 4 weeks after  - ppx: nystatin, valcyte TIW, bactrim held, switched to mepron     [] HTN  - here, labetalol 100 TID, amlodipine 5

## 2025-05-07 NOTE — DISCHARGE NOTE NURSING/CASE MANAGEMENT/SOCIAL WORK - PATIENT PORTAL LINK FT
You can access the FollowMyHealth Patient Portal offered by [facility] by registering at the following website: [email or web address]. By joining [employer]’s FollowMyHealth portal, you will also be able to view your health information using other applications (apps) compatible with our system.

## 2025-05-07 NOTE — PROGRESS NOTE ADULT - TIME BILLING
Face-to-face encounter, review of extensive medical records in this and prior charts, laboratory findings, radiographic findings, review of immunosuppression, review of medication regimen for comorbidities; documentation as noted above and discussion of diagnostic impressions and plan with the patient and team.
Patient with DDRT (12/5/24) admitted with rising creatinine.  Reviewed immunosuppression, clinical, lab, imaging data  noted uptrending creatinine  DSA- negative  Comorbidities reviewed  Reviewed management regimen for comorbidities, reviewed out patient records, donor details, medication regimen for comorbidities  Reviewed imaging abnormality with team, surgeon. Cyst in allograft.  Suggestions  Steroid pulse to continue as planned  Tac/MMF to continue  F/u BK pCR  F/u allograft sonogram in 3 months for cyst stability  Case d/w transplant team/pr nephrologist  Allograft biopsy in AM.    Will follow  I was present during and reviewed clinical and lab data as well as assessment and plan as documented. Please contact if any additional questions with any change in clinical condition or on availability of any additional information or reports.
Patient with DDRT (12/5/24) admitted with rising creatinine.  Reviewed immunosuppression, clinical, lab, imaging data  Comorbidities reviewed  Reviewed management regimen for comorbidities, reviewed out patient records, donor details, medication regimen for comorbidities  Reviewed imaging abnormality with team, surgeon. Cyst in allograft.  DSA reported neg  Suggestions  Steroid pulse to continue as planned  Tac/MMF to continue  F/u BK pCR  F/u allograft sonogram in 3 months for cyst stability  Case d/w transplant team/pr nephrologist  Will follow  I was present during and reviewed clinical and lab data as well as assessment and plan as documented by the house staff as noted. Please contact if any additional questions with any change in clinical condition or on availability of any additional information or reports.

## 2025-05-08 PROBLEM — E78.5 HYPERLIPIDEMIA, UNSPECIFIED: Chronic | Status: ACTIVE | Noted: 2025-05-02

## 2025-05-08 PROBLEM — Q61.3 POLYCYSTIC KIDNEY, UNSPECIFIED: Chronic | Status: ACTIVE | Noted: 2025-05-02

## 2025-05-08 PROBLEM — I10 ESSENTIAL (PRIMARY) HYPERTENSION: Chronic | Status: ACTIVE | Noted: 2025-05-02

## 2025-05-08 LAB
CULTURE RESULTS: SIGNIFICANT CHANGE UP
CULTURE RESULTS: SIGNIFICANT CHANGE UP
SPECIMEN SOURCE: SIGNIFICANT CHANGE UP
SPECIMEN SOURCE: SIGNIFICANT CHANGE UP

## 2025-05-08 RX ORDER — BELATACEPT 250 MG/1
250 INJECTION, POWDER, LYOPHILIZED, FOR SOLUTION INTRAVENOUS
Qty: 2 | Refills: 0 | Status: ACTIVE | OUTPATIENT
Start: 2025-05-08

## 2025-05-09 LAB
GAMMA INTERFERON BACKGROUND BLD IA-ACNC: 0.03 IU/ML — SIGNIFICANT CHANGE UP
M TB IFN-G BLD-IMP: NEGATIVE — SIGNIFICANT CHANGE UP
M TB IFN-G CD4+ BCKGRND COR BLD-ACNC: 0 IU/ML — SIGNIFICANT CHANGE UP
M TB IFN-G CD4+CD8+ BCKGRND COR BLD-ACNC: 0.01 IU/ML — SIGNIFICANT CHANGE UP
QUANT TB PLUS MITOGEN MINUS NIL: 1.1 IU/ML — SIGNIFICANT CHANGE UP

## 2025-05-09 RX ORDER — TACROLIMUS 0.5 MG/1
1 CAPSULE ORAL
Qty: 0 | Refills: 0 | DISCHARGE
Start: 2025-05-09

## 2025-05-15 ENCOUNTER — APPOINTMENT (OUTPATIENT)
Dept: TRANSPLANT | Facility: CLINIC | Age: 80
End: 2025-05-15

## 2025-05-15 ENCOUNTER — NON-APPOINTMENT (OUTPATIENT)
Age: 80
End: 2025-05-15

## 2025-05-15 ENCOUNTER — LABORATORY RESULT (OUTPATIENT)
Age: 80
End: 2025-05-15

## 2025-05-15 LAB
ALBUMIN SERPL ELPH-MCNC: 4.2 G/DL
ALP BLD-CCNC: 53 U/L
ALT SERPL-CCNC: 14 U/L
ANION GAP SERPL CALC-SCNC: 13 MMOL/L
APPEARANCE: CLEAR
AST SERPL-CCNC: 12 U/L
BACTERIA: NEGATIVE /HPF
BILIRUB SERPL-MCNC: 0.3 MG/DL
BILIRUBIN URINE: NEGATIVE
BLOOD URINE: NEGATIVE
BUN SERPL-MCNC: 31 MG/DL
CALCIUM SERPL-MCNC: 8.6 MG/DL
CALCIUM SERPL-MCNC: 8.6 MG/DL
CAST: 2 /LPF
CHLORIDE SERPL-SCNC: 107 MMOL/L
CO2 SERPL-SCNC: 24 MMOL/L
COLOR: YELLOW
CREAT SERPL-MCNC: 1.81 MG/DL
CREAT SPEC-SCNC: 111 MG/DL
CREAT/PROT UR: 0.5 RATIO
EGFRCR SERPLBLD CKD-EPI 2021: 37 ML/MIN/1.73M2
EPITHELIAL CELLS: 0 /HPF
GLUCOSE QUALITATIVE U: NEGATIVE MG/DL
GLUCOSE SERPL-MCNC: 170 MG/DL
KETONES URINE: NEGATIVE MG/DL
LEUKOCYTE ESTERASE URINE: NEGATIVE
MAGNESIUM SERPL-MCNC: 1.4 MG/DL
MICROSCOPIC-UA: NORMAL
NITRITE URINE: NEGATIVE
PARATHYROID HORMONE INTACT: 203 PG/ML
PH URINE: 6
PHOSPHATE SERPL-MCNC: 2.5 MG/DL
POTASSIUM SERPL-SCNC: 3.4 MMOL/L
PROT SERPL-MCNC: 6.2 G/DL
PROT UR-MCNC: 50 MG/DL
PROTEIN URINE: 100 MG/DL
RED BLOOD CELLS URINE: 0 /HPF
SODIUM SERPL-SCNC: 144 MMOL/L
SPECIFIC GRAVITY URINE: 1.02
TACROLIMUS SERPL-MCNC: 5.3 NG/ML
UROBILINOGEN URINE: 0.2 MG/DL
WHITE BLOOD CELLS URINE: 0 /HPF

## 2025-05-16 LAB
BASOPHILS # BLD AUTO: 0.11 K/UL
BASOPHILS NFR BLD AUTO: 0.9 %
EOSINOPHIL # BLD AUTO: 0 K/UL
EOSINOPHIL NFR BLD AUTO: 0 %
HCT VFR BLD CALC: 35.1 %
HGB BLD-MCNC: 11.8 G/DL
LYMPHOCYTES # BLD AUTO: 1.05 K/UL
LYMPHOCYTES NFR BLD AUTO: 8.7 %
MAN DIFF?: NORMAL
MCHC RBC-ENTMCNC: 32.4 PG
MCHC RBC-ENTMCNC: 33.6 G/DL
MCV RBC AUTO: 96.4 FL
MONOCYTES # BLD AUTO: 0.42 K/UL
MONOCYTES NFR BLD AUTO: 3.5 %
NEUTROPHILS # BLD AUTO: 10.4 K/UL
NEUTROPHILS NFR BLD AUTO: 81.6 %
PLATELET # BLD AUTO: 323 K/UL
RBC # BLD: 3.64 M/UL
RBC # FLD: 14.7 %
WBC # FLD AUTO: 12.09 K/UL

## 2025-05-19 LAB — BKV DNA SPEC QL NAA+PROBE: NOT DETECTED IU/ML

## 2025-05-20 RX ORDER — BELATACEPT 250 MG/1
250 INJECTION, POWDER, LYOPHILIZED, FOR SOLUTION INTRAVENOUS
Refills: 0 | Status: ACTIVE | OUTPATIENT
Start: 2025-05-20 | End: 1900-01-01

## 2025-05-22 LAB — G6PD SER-CCNC: 17.7 U/G HGB

## 2025-05-23 ENCOUNTER — APPOINTMENT (OUTPATIENT)
Dept: NEPHROLOGY | Facility: CLINIC | Age: 80
End: 2025-05-23
Payer: MEDICARE

## 2025-05-23 VITALS
DIASTOLIC BLOOD PRESSURE: 83 MMHG | WEIGHT: 141 LBS | BODY MASS INDEX: 24.98 KG/M2 | OXYGEN SATURATION: 97 % | HEIGHT: 63 IN | SYSTOLIC BLOOD PRESSURE: 171 MMHG | TEMPERATURE: 97.6 F | HEART RATE: 97 BPM | RESPIRATION RATE: 16 BRPM

## 2025-05-23 DIAGNOSIS — Z94.0 KIDNEY TRANSPLANT STATUS: ICD-10-CM

## 2025-05-23 DIAGNOSIS — I10 ESSENTIAL (PRIMARY) HYPERTENSION: ICD-10-CM

## 2025-05-23 DIAGNOSIS — Z79.60 LONG TERM (CURRENT) USE OF UNSPECIFIED IMMUNOMODULATORS AND IMMUNOSUPPRESSANTS: ICD-10-CM

## 2025-05-23 PROCEDURE — 99214 OFFICE O/P EST MOD 30 MIN: CPT

## 2025-05-23 RX ORDER — DAPSONE 100 MG/1
100 TABLET ORAL
Qty: 30 | Refills: 3 | Status: ACTIVE | COMMUNITY
Start: 2025-05-23 | End: 1900-01-01

## 2025-05-27 ENCOUNTER — APPOINTMENT (OUTPATIENT)
Dept: RHEUMATOLOGY | Facility: CLINIC | Age: 80
End: 2025-05-27
Payer: MEDICARE

## 2025-05-27 ENCOUNTER — APPOINTMENT (OUTPATIENT)
Dept: TRANSPLANT | Facility: CLINIC | Age: 80
End: 2025-05-27

## 2025-05-27 VITALS
TEMPERATURE: 98.2 F | RESPIRATION RATE: 18 BRPM | OXYGEN SATURATION: 98 % | SYSTOLIC BLOOD PRESSURE: 170 MMHG | DIASTOLIC BLOOD PRESSURE: 75 MMHG | HEART RATE: 84 BPM

## 2025-05-27 VITALS
DIASTOLIC BLOOD PRESSURE: 75 MMHG | SYSTOLIC BLOOD PRESSURE: 155 MMHG | HEART RATE: 72 BPM | OXYGEN SATURATION: 99 % | RESPIRATION RATE: 18 BRPM

## 2025-05-27 PROCEDURE — 96365 THER/PROPH/DIAG IV INF INIT: CPT

## 2025-05-27 RX ORDER — BELATACEPT 250 MG/1
250 INJECTION, POWDER, LYOPHILIZED, FOR SOLUTION INTRAVENOUS
Qty: 0 | Refills: 0 | Status: COMPLETED
Start: 2025-05-20

## 2025-06-02 ENCOUNTER — APPOINTMENT (OUTPATIENT)
Dept: NEPHROLOGY | Facility: CLINIC | Age: 80
End: 2025-06-02

## 2025-06-02 PROCEDURE — 87798 DETECT AGENT NOS DNA AMP: CPT

## 2025-06-02 PROCEDURE — 83036 HEMOGLOBIN GLYCOSYLATED A1C: CPT

## 2025-06-02 PROCEDURE — 84100 ASSAY OF PHOSPHORUS: CPT

## 2025-06-02 PROCEDURE — 80053 COMPREHEN METABOLIC PANEL: CPT

## 2025-06-02 PROCEDURE — 80197 ASSAY OF TACROLIMUS: CPT

## 2025-06-02 PROCEDURE — 85730 THROMBOPLASTIN TIME PARTIAL: CPT

## 2025-06-02 PROCEDURE — 84133 ASSAY OF URINE POTASSIUM: CPT

## 2025-06-02 PROCEDURE — 84156 ASSAY OF PROTEIN URINE: CPT

## 2025-06-02 PROCEDURE — 88350 IMFLUOR EA ADDL 1ANTB STN PX: CPT

## 2025-06-02 PROCEDURE — 88348 ELECTRON MICROSCOPY DX: CPT

## 2025-06-02 PROCEDURE — 76776 US EXAM K TRANSPL W/DOPPLER: CPT

## 2025-06-02 PROCEDURE — 84105 ASSAY OF URINE PHOSPHORUS: CPT

## 2025-06-02 PROCEDURE — 88313 SPECIAL STAINS GROUP 2: CPT

## 2025-06-02 PROCEDURE — 81003 URINALYSIS AUTO W/O SCOPE: CPT

## 2025-06-02 PROCEDURE — 74176 CT ABD & PELVIS W/O CONTRAST: CPT | Mod: MC

## 2025-06-02 PROCEDURE — 86480 TB TEST CELL IMMUN MEASURE: CPT

## 2025-06-02 PROCEDURE — 83735 ASSAY OF MAGNESIUM: CPT

## 2025-06-02 PROCEDURE — 82306 VITAMIN D 25 HYDROXY: CPT

## 2025-06-02 PROCEDURE — 88305 TISSUE EXAM BY PATHOLOGIST: CPT

## 2025-06-02 PROCEDURE — 87086 URINE CULTURE/COLONY COUNT: CPT

## 2025-06-02 PROCEDURE — 36415 COLL VENOUS BLD VENIPUNCTURE: CPT

## 2025-06-02 PROCEDURE — 82310 ASSAY OF CALCIUM: CPT

## 2025-06-02 PROCEDURE — 86901 BLOOD TYPING SEROLOGIC RH(D): CPT

## 2025-06-02 PROCEDURE — 86334 IMMUNOFIX E-PHORESIS SERUM: CPT

## 2025-06-02 PROCEDURE — 85610 PROTHROMBIN TIME: CPT

## 2025-06-02 PROCEDURE — 83935 ASSAY OF URINE OSMOLALITY: CPT

## 2025-06-02 PROCEDURE — 84540 ASSAY OF URINE/UREA-N: CPT

## 2025-06-02 PROCEDURE — 85025 COMPLETE CBC W/AUTO DIFF WBC: CPT

## 2025-06-02 PROCEDURE — 76942 ECHO GUIDE FOR BIOPSY: CPT

## 2025-06-02 PROCEDURE — 86900 BLOOD TYPING SEROLOGIC ABO: CPT

## 2025-06-02 PROCEDURE — 84550 ASSAY OF BLOOD/URIC ACID: CPT

## 2025-06-02 PROCEDURE — 87799 DETECT AGENT NOS DNA QUANT: CPT

## 2025-06-02 PROCEDURE — 82570 ASSAY OF URINE CREATININE: CPT

## 2025-06-02 PROCEDURE — 82340 ASSAY OF CALCIUM IN URINE: CPT

## 2025-06-02 PROCEDURE — 86850 RBC ANTIBODY SCREEN: CPT

## 2025-06-02 PROCEDURE — 50200 RENAL BIOPSY PERQ: CPT

## 2025-06-02 PROCEDURE — C1889: CPT

## 2025-06-02 PROCEDURE — 88346 IMFLUOR 1ST 1ANTB STAIN PX: CPT

## 2025-06-02 PROCEDURE — 82962 GLUCOSE BLOOD TEST: CPT

## 2025-06-02 PROCEDURE — 84300 ASSAY OF URINE SODIUM: CPT

## 2025-06-02 PROCEDURE — 87040 BLOOD CULTURE FOR BACTERIA: CPT

## 2025-06-02 PROCEDURE — 82652 VIT D 1 25-DIHYDROXY: CPT

## 2025-06-02 PROCEDURE — 83970 ASSAY OF PARATHORMONE: CPT

## 2025-06-11 ENCOUNTER — APPOINTMENT (OUTPATIENT)
Dept: RHEUMATOLOGY | Facility: CLINIC | Age: 80
End: 2025-06-11
Payer: MEDICARE

## 2025-06-11 ENCOUNTER — LABORATORY RESULT (OUTPATIENT)
Age: 80
End: 2025-06-11

## 2025-06-11 VITALS
TEMPERATURE: 98 F | RESPIRATION RATE: 17 BRPM | OXYGEN SATURATION: 95 % | DIASTOLIC BLOOD PRESSURE: 78 MMHG | SYSTOLIC BLOOD PRESSURE: 170 MMHG | HEART RATE: 86 BPM

## 2025-06-11 VITALS
OXYGEN SATURATION: 95 % | RESPIRATION RATE: 17 BRPM | HEART RATE: 87 BPM | SYSTOLIC BLOOD PRESSURE: 175 MMHG | DIASTOLIC BLOOD PRESSURE: 83 MMHG

## 2025-06-11 PROCEDURE — 96365 THER/PROPH/DIAG IV INF INIT: CPT

## 2025-06-11 RX ORDER — BELATACEPT 250 MG/1
250 INJECTION, POWDER, LYOPHILIZED, FOR SOLUTION INTRAVENOUS
Qty: 0 | Refills: 0 | Status: COMPLETED
Start: 2025-05-20

## 2025-06-12 RX ORDER — CHOLECALCIFEROL (VITAMIN D3) 1250 MCG
1.25 MG CAPSULE ORAL
Qty: 8 | Refills: 0 | Status: ACTIVE | COMMUNITY
Start: 2025-06-12 | End: 1900-01-01

## 2025-06-13 LAB
25(OH)D3 SERPL-MCNC: 18.6 NG/ML
ALBUMIN SERPL ELPH-MCNC: 4.6 G/DL
ALP BLD-CCNC: 55 U/L
ALT SERPL-CCNC: 14 U/L
ANION GAP SERPL CALC-SCNC: 19 MMOL/L
APPEARANCE: CLEAR
AST SERPL-CCNC: 21 U/L
BACTERIA: NEGATIVE /HPF
BASOPHILS # BLD AUTO: 0 K/UL
BASOPHILS NFR BLD AUTO: 0 %
BILIRUB SERPL-MCNC: 0.6 MG/DL
BILIRUBIN URINE: NEGATIVE
BKV DNA SPEC QL NAA+PROBE: NOT DETECTED IU/ML
BLOOD URINE: NEGATIVE
BUN SERPL-MCNC: 38 MG/DL
CALCIUM SERPL-MCNC: 9.6 MG/DL
CALCIUM SERPL-MCNC: 9.6 MG/DL
CAST: 2 /LPF
CHLORIDE SERPL-SCNC: 102 MMOL/L
CHOLEST SERPL-MCNC: 150 MG/DL
CMV DNA SPEC QL NAA+PROBE: NOT DETECTED IU/ML
CMVPCR LOG: NOT DETECTED LOG10IU/ML
CO2 SERPL-SCNC: 19 MMOL/L
COLOR: YELLOW
CREAT SERPL-MCNC: 1.73 MG/DL
CREAT SPEC-SCNC: 67 MG/DL
CREAT/PROT UR: 0.5 RATIO
EGFRCR SERPLBLD CKD-EPI 2021: 39 ML/MIN/1.73M2
EOSINOPHIL # BLD AUTO: 0.23 K/UL
EOSINOPHIL NFR BLD AUTO: 1.8 %
EPITHELIAL CELLS: 0 /HPF
ESTIMATED AVERAGE GLUCOSE: 111 MG/DL
GLUCOSE QUALITATIVE U: 100 MG/DL
GLUCOSE SERPL-MCNC: 97 MG/DL
HBA1C MFR BLD HPLC: 5.5 %
HCT VFR BLD CALC: 33.9 %
HDLC SERPL-MCNC: 64 MG/DL
HGB BLD-MCNC: 11.2 G/DL
KETONES URINE: NEGATIVE MG/DL
LDLC SERPL-MCNC: 66 MG/DL
LEUKOCYTE ESTERASE URINE: NEGATIVE
LYMPHOCYTES # BLD AUTO: 1.22 K/UL
LYMPHOCYTES NFR BLD AUTO: 9.7 %
MAGNESIUM SERPL-MCNC: 2.2 MG/DL
MAN DIFF?: NORMAL
MCHC RBC-ENTMCNC: 32.7 PG
MCHC RBC-ENTMCNC: 33 G/DL
MCV RBC AUTO: 99.1 FL
MICROSCOPIC-UA: NORMAL
MONOCYTES # BLD AUTO: 0.67 K/UL
MONOCYTES NFR BLD AUTO: 5.3 %
NEUTROPHILS # BLD AUTO: 8.69 K/UL
NEUTROPHILS NFR BLD AUTO: 68.2 %
NITRITE URINE: NEGATIVE
NONHDLC SERPL-MCNC: 86 MG/DL
PARATHYROID HORMONE INTACT: 197 PG/ML
PH URINE: 6.5
PHOSPHATE SERPL-MCNC: 3.2 MG/DL
PLATELET # BLD AUTO: 306 K/UL
POTASSIUM SERPL-SCNC: 4.6 MMOL/L
PROT SERPL-MCNC: 6.5 G/DL
PROT UR-MCNC: 31 MG/DL
PROTEIN URINE: 30 MG/DL
RBC # BLD: 3.42 M/UL
RBC # FLD: 13.9 %
RED BLOOD CELLS URINE: 0 /HPF
SODIUM SERPL-SCNC: 140 MMOL/L
SPECIFIC GRAVITY URINE: 1.01
TACROLIMUS SERPL-MCNC: 3.4 NG/ML
TRIGL SERPL-MCNC: 113 MG/DL
UROBILINOGEN URINE: 0.2 MG/DL
WBC # FLD AUTO: 12.57 K/UL
WHITE BLOOD CELLS URINE: 0 /HPF

## 2025-06-25 ENCOUNTER — APPOINTMENT (OUTPATIENT)
Dept: RHEUMATOLOGY | Facility: CLINIC | Age: 80
End: 2025-06-25
Payer: MEDICARE

## 2025-06-25 VITALS
SYSTOLIC BLOOD PRESSURE: 165 MMHG | TEMPERATURE: 98.8 F | OXYGEN SATURATION: 95 % | RESPIRATION RATE: 17 BRPM | DIASTOLIC BLOOD PRESSURE: 80 MMHG | HEART RATE: 90 BPM

## 2025-06-25 VITALS
DIASTOLIC BLOOD PRESSURE: 82 MMHG | RESPIRATION RATE: 17 BRPM | SYSTOLIC BLOOD PRESSURE: 167 MMHG | HEART RATE: 87 BPM | OXYGEN SATURATION: 95 %

## 2025-06-25 PROCEDURE — 96365 THER/PROPH/DIAG IV INF INIT: CPT

## 2025-06-25 RX ORDER — BELATACEPT 250 MG/1
250 INJECTION, POWDER, LYOPHILIZED, FOR SOLUTION INTRAVENOUS
Qty: 0 | Refills: 0 | Status: COMPLETED
Start: 2025-05-20

## 2025-07-01 ENCOUNTER — APPOINTMENT (OUTPATIENT)
Dept: NEPHROLOGY | Facility: CLINIC | Age: 80
End: 2025-07-01
Payer: MEDICARE

## 2025-07-01 ENCOUNTER — LABORATORY RESULT (OUTPATIENT)
Age: 80
End: 2025-07-01

## 2025-07-01 VITALS
TEMPERATURE: 97.6 F | WEIGHT: 142 LBS | RESPIRATION RATE: 17 BRPM | DIASTOLIC BLOOD PRESSURE: 68 MMHG | SYSTOLIC BLOOD PRESSURE: 144 MMHG | HEART RATE: 83 BPM | HEIGHT: 63 IN | OXYGEN SATURATION: 95 % | BODY MASS INDEX: 25.16 KG/M2

## 2025-07-01 PROCEDURE — 99214 OFFICE O/P EST MOD 30 MIN: CPT

## 2025-07-02 ENCOUNTER — NON-APPOINTMENT (OUTPATIENT)
Age: 80
End: 2025-07-02

## 2025-07-02 LAB
ALBUMIN SERPL ELPH-MCNC: 4.6 G/DL
ALP BLD-CCNC: 55 U/L
ALT SERPL-CCNC: 11 U/L
ANION GAP SERPL CALC-SCNC: 16 MMOL/L
APPEARANCE: CLEAR
AST SERPL-CCNC: 19 U/L
BACTERIA: NEGATIVE /HPF
BASOPHILS # BLD AUTO: 0 K/UL
BASOPHILS NFR BLD AUTO: 0 %
BILIRUB SERPL-MCNC: 0.3 MG/DL
BILIRUBIN URINE: NEGATIVE
BLOOD URINE: NEGATIVE
BUN SERPL-MCNC: 33 MG/DL
CALCIUM SERPL-MCNC: 9.4 MG/DL
CALCIUM SERPL-MCNC: 9.4 MG/DL
CAST: 0 /LPF
CHLORIDE SERPL-SCNC: 105 MMOL/L
CO2 SERPL-SCNC: 19 MMOL/L
COLOR: YELLOW
CREAT SERPL-MCNC: 1.56 MG/DL
CREAT SPEC-SCNC: 95 MG/DL
CREAT/PROT UR: 0.3 RATIO
EGFRCR SERPLBLD CKD-EPI 2021: 44 ML/MIN/1.73M2
EOSINOPHIL # BLD AUTO: 0 K/UL
EOSINOPHIL NFR BLD AUTO: 0 %
EPITHELIAL CELLS: 0 /HPF
GLUCOSE QUALITATIVE U: 250 MG/DL
GLUCOSE SERPL-MCNC: 193 MG/DL
HCT VFR BLD CALC: 31.3 %
HGB BLD-MCNC: 10.1 G/DL
KETONES URINE: NEGATIVE MG/DL
LEUKOCYTE ESTERASE URINE: NEGATIVE
LYMPHOCYTES # BLD AUTO: 0.68 K/UL
LYMPHOCYTES NFR BLD AUTO: 6.2 %
MAGNESIUM SERPL-MCNC: 2.5 MG/DL
MAN DIFF?: NORMAL
MCHC RBC-ENTMCNC: 32.3 G/DL
MCHC RBC-ENTMCNC: 32.6 PG
MCV RBC AUTO: 101 FL
MICROSCOPIC-UA: NORMAL
MONOCYTES # BLD AUTO: 0.76 K/UL
MONOCYTES NFR BLD AUTO: 7 %
NEUTROPHILS # BLD AUTO: 8.61 K/UL
NEUTROPHILS NFR BLD AUTO: 75.4 %
NITRITE URINE: NEGATIVE
PARATHYROID HORMONE INTACT: 276 PG/ML
PH URINE: 6.5
PHOSPHATE SERPL-MCNC: 3.9 MG/DL
PLATELET # BLD AUTO: 313 K/UL
POTASSIUM SERPL-SCNC: 4.2 MMOL/L
PROT SERPL-MCNC: 6.4 G/DL
PROT UR-MCNC: 32 MG/DL
PROTEIN URINE: 30 MG/DL
RBC # BLD: 3.1 M/UL
RBC # FLD: 14 %
RED BLOOD CELLS URINE: 0 /HPF
SODIUM SERPL-SCNC: 141 MMOL/L
SPECIFIC GRAVITY URINE: 1.02
TACROLIMUS SERPL-MCNC: 2.6 NG/ML
UROBILINOGEN URINE: 0.2 MG/DL
WBC # FLD AUTO: 10.91 K/UL
WHITE BLOOD CELLS URINE: 1 /HPF

## 2025-07-03 LAB
BKV DNA SPEC QL NAA+PROBE: NOT DETECTED IU/ML
CMV DNA SPEC QL NAA+PROBE: NOT DETECTED IU/ML
CMVPCR LOG: NOT DETECTED LOG10IU/ML

## 2025-07-11 ENCOUNTER — APPOINTMENT (OUTPATIENT)
Dept: RHEUMATOLOGY | Facility: CLINIC | Age: 80
End: 2025-07-11
Payer: MEDICARE

## 2025-07-11 ENCOUNTER — LABORATORY RESULT (OUTPATIENT)
Age: 80
End: 2025-07-11

## 2025-07-11 VITALS
HEART RATE: 64 BPM | RESPIRATION RATE: 16 BRPM | DIASTOLIC BLOOD PRESSURE: 71 MMHG | OXYGEN SATURATION: 95 % | SYSTOLIC BLOOD PRESSURE: 167 MMHG

## 2025-07-11 VITALS
HEART RATE: 84 BPM | TEMPERATURE: 98.8 F | RESPIRATION RATE: 16 BRPM | SYSTOLIC BLOOD PRESSURE: 151 MMHG | OXYGEN SATURATION: 95 % | DIASTOLIC BLOOD PRESSURE: 69 MMHG

## 2025-07-11 PROCEDURE — 96365 THER/PROPH/DIAG IV INF INIT: CPT

## 2025-07-14 ENCOUNTER — APPOINTMENT (OUTPATIENT)
Dept: TRANSPLANT | Facility: CLINIC | Age: 80
End: 2025-07-14

## 2025-07-14 ENCOUNTER — NON-APPOINTMENT (OUTPATIENT)
Age: 80
End: 2025-07-14

## 2025-07-14 LAB
ALBUMIN SERPL ELPH-MCNC: 4.3 G/DL
ALP BLD-CCNC: 59 U/L
ALT SERPL-CCNC: 21 U/L
ANION GAP SERPL CALC-SCNC: 15 MMOL/L
APPEARANCE: CLEAR
AST SERPL-CCNC: 22 U/L
BACTERIA: NEGATIVE /HPF
BASOPHILS # BLD AUTO: 0 K/UL
BASOPHILS NFR BLD AUTO: 0 %
BILIRUB SERPL-MCNC: 0.2 MG/DL
BILIRUBIN URINE: NEGATIVE
BKV DNA SPEC QL NAA+PROBE: NOT DETECTED IU/ML
BLOOD URINE: NEGATIVE
BUN SERPL-MCNC: 29 MG/DL
CALCIUM SERPL-MCNC: 9.6 MG/DL
CALCIUM SERPL-MCNC: 9.6 MG/DL
CAST: 3 /LPF
CHLORIDE SERPL-SCNC: 103 MMOL/L
CMV DNA SPEC QL NAA+PROBE: NOT DETECTED IU/ML
CMVPCR LOG: NOT DETECTED LOG10IU/ML
CO2 SERPL-SCNC: 22 MMOL/L
COLOR: YELLOW
CREAT SERPL-MCNC: 1.67 MG/DL
CREAT SPEC-SCNC: 99 MG/DL
CREAT/PROT UR: 0.4 RATIO
EGFRCR SERPLBLD CKD-EPI 2021: 41 ML/MIN/1.73M2
EOSINOPHIL # BLD AUTO: 0.16 K/UL
EOSINOPHIL NFR BLD AUTO: 1.1 %
EPITHELIAL CELLS: 0 /HPF
GLUCOSE QUALITATIVE U: 100 MG/DL
GLUCOSE SERPL-MCNC: 125 MG/DL
HCT VFR BLD CALC: 33.3 %
HGB BLD-MCNC: 10.4 G/DL
KETONES URINE: NEGATIVE MG/DL
LEUKOCYTE ESTERASE URINE: ABNORMAL
LYMPHOCYTES # BLD AUTO: 0.64 K/UL
LYMPHOCYTES NFR BLD AUTO: 4.5 %
MAGNESIUM SERPL-MCNC: 1.9 MG/DL
MAN DIFF?: NORMAL
MCHC RBC-ENTMCNC: 31.2 G/DL
MCHC RBC-ENTMCNC: 31.6 PG
MCV RBC AUTO: 101.2 FL
MICROSCOPIC-UA: NORMAL
MONOCYTES # BLD AUTO: 1.63 K/UL
MONOCYTES NFR BLD AUTO: 11.4 %
NEUTROPHILS # BLD AUTO: 11.23 K/UL
NEUTROPHILS NFR BLD AUTO: 70.5 %
NITRITE URINE: NEGATIVE
PARATHYROID HORMONE INTACT: 128 PG/ML
PH URINE: 7
PHOSPHATE SERPL-MCNC: 3.6 MG/DL
PLATELET # BLD AUTO: 427 K/UL
POTASSIUM SERPL-SCNC: 3.8 MMOL/L
PROT SERPL-MCNC: 6.3 G/DL
PROT UR-MCNC: 37 MG/DL
PROTEIN URINE: 30 MG/DL
RBC # BLD: 3.29 M/UL
RBC # FLD: 14.2 %
RED BLOOD CELLS URINE: 1 /HPF
SODIUM SERPL-SCNC: 140 MMOL/L
SPECIFIC GRAVITY URINE: 1.01
TACROLIMUS SERPL-MCNC: 4.1 NG/ML
UROBILINOGEN URINE: 0.2 MG/DL
WBC # FLD AUTO: 14.3 K/UL
WHITE BLOOD CELLS URINE: 1 /HPF

## 2025-07-14 RX ORDER — AMOXICILLIN 500 MG/1
500 CAPSULE ORAL
Qty: 28 | Refills: 3 | Status: ACTIVE | COMMUNITY
Start: 2025-07-09 | End: 1900-01-01

## 2025-07-22 RX ORDER — BELATACEPT 250 MG/1
250 INJECTION, POWDER, LYOPHILIZED, FOR SOLUTION INTRAVENOUS
Refills: 0 | Status: ACTIVE | OUTPATIENT
Start: 2025-07-22 | End: 1900-01-01

## 2025-08-08 ENCOUNTER — APPOINTMENT (OUTPATIENT)
Dept: TRANSPLANT | Facility: CLINIC | Age: 80
End: 2025-08-08

## 2025-08-08 ENCOUNTER — APPOINTMENT (OUTPATIENT)
Dept: RHEUMATOLOGY | Facility: CLINIC | Age: 80
End: 2025-08-08
Payer: MEDICARE

## 2025-08-08 VITALS
DIASTOLIC BLOOD PRESSURE: 84 MMHG | HEART RATE: 83 BPM | SYSTOLIC BLOOD PRESSURE: 174 MMHG | OXYGEN SATURATION: 95 % | TEMPERATURE: 98 F | RESPIRATION RATE: 16 BRPM

## 2025-08-08 VITALS
SYSTOLIC BLOOD PRESSURE: 167 MMHG | HEART RATE: 69 BPM | DIASTOLIC BLOOD PRESSURE: 73 MMHG | OXYGEN SATURATION: 95 % | RESPIRATION RATE: 16 BRPM

## 2025-08-08 PROCEDURE — 96365 THER/PROPH/DIAG IV INF INIT: CPT

## 2025-08-08 RX ORDER — BELATACEPT 250 MG/1
250 INJECTION, POWDER, LYOPHILIZED, FOR SOLUTION INTRAVENOUS
Qty: 0 | Refills: 0 | Status: COMPLETED
Start: 2025-07-22

## 2025-08-11 LAB
ALBUMIN SERPL ELPH-MCNC: 4.4 G/DL
ALP BLD-CCNC: 59 U/L
ALT SERPL-CCNC: 12 U/L
ANION GAP SERPL CALC-SCNC: 13 MMOL/L
APPEARANCE: CLEAR
AST SERPL-CCNC: 22 U/L
BACTERIA: NEGATIVE /HPF
BASOPHILS # BLD AUTO: 0.03 K/UL
BASOPHILS NFR BLD AUTO: 0.4 %
BILIRUB SERPL-MCNC: 0.4 MG/DL
BILIRUBIN URINE: NEGATIVE
BLOOD URINE: NEGATIVE
BUN SERPL-MCNC: 30 MG/DL
CALCIUM SERPL-MCNC: 9.6 MG/DL
CALCIUM SERPL-MCNC: 9.6 MG/DL
CAST: 0 /LPF
CHLORIDE SERPL-SCNC: 108 MMOL/L
CMV DNA SPEC QL NAA+PROBE: ABNORMAL IU/ML
CMVPCR LOG: ABNORMAL LOG10IU/ML
CO2 SERPL-SCNC: 23 MMOL/L
COLOR: YELLOW
CREAT SERPL-MCNC: 1.28 MG/DL
CREAT SPEC-SCNC: 91 MG/DL
CREAT/PROT UR: 0.3 RATIO
EGFRCR SERPLBLD CKD-EPI 2021: 56 ML/MIN/1.73M2
EOSINOPHIL # BLD AUTO: 0.12 K/UL
EOSINOPHIL NFR BLD AUTO: 1.5 %
EPITHELIAL CELLS: 0 /HPF
GLUCOSE QUALITATIVE U: 100 MG/DL
GLUCOSE SERPL-MCNC: 110 MG/DL
HCT VFR BLD CALC: 32.9 %
HGB BLD-MCNC: 10.4 G/DL
IMM GRANULOCYTES NFR BLD AUTO: 0.5 %
KETONES URINE: NEGATIVE MG/DL
LEUKOCYTE ESTERASE URINE: ABNORMAL
LYMPHOCYTES # BLD AUTO: 1.94 K/UL
LYMPHOCYTES NFR BLD AUTO: 23.7 %
MAGNESIUM SERPL-MCNC: 2.3 MG/DL
MAN DIFF?: NORMAL
MCHC RBC-ENTMCNC: 31.4 PG
MCHC RBC-ENTMCNC: 31.6 G/DL
MCV RBC AUTO: 99.4 FL
MICROSCOPIC-UA: NORMAL
MONOCYTES # BLD AUTO: 1.13 K/UL
MONOCYTES NFR BLD AUTO: 13.8 %
NEUTROPHILS # BLD AUTO: 4.91 K/UL
NEUTROPHILS NFR BLD AUTO: 60.1 %
NITRITE URINE: NEGATIVE
PARATHYROID HORMONE INTACT: 165 PG/ML
PH URINE: 6.5
PHOSPHATE SERPL-MCNC: 3.7 MG/DL
PLATELET # BLD AUTO: 271 K/UL
POTASSIUM SERPL-SCNC: 4.4 MMOL/L
PROT SERPL-MCNC: 6.3 G/DL
PROT UR-MCNC: 31 MG/DL
PROTEIN URINE: 30 MG/DL
RBC # BLD: 3.31 M/UL
RBC # FLD: 16.6 %
RED BLOOD CELLS URINE: 0 /HPF
SODIUM SERPL-SCNC: 143 MMOL/L
SPECIFIC GRAVITY URINE: 1.02
TACROLIMUS SERPL-MCNC: 2.7 NG/ML
URATE SERPL-MCNC: 4 MG/DL
UROBILINOGEN URINE: 0.2 MG/DL
WBC # FLD AUTO: 8.17 K/UL
WHITE BLOOD CELLS URINE: 1 /HPF

## 2025-08-12 LAB — BKV DNA SPEC QL NAA+PROBE: NOT DETECTED IU/ML

## 2025-09-08 ENCOUNTER — APPOINTMENT (OUTPATIENT)
Dept: NEPHROLOGY | Facility: CLINIC | Age: 80
End: 2025-09-08
Payer: MEDICARE

## 2025-09-08 VITALS
HEIGHT: 63 IN | WEIGHT: 142 LBS | HEART RATE: 76 BPM | BODY MASS INDEX: 25.16 KG/M2 | DIASTOLIC BLOOD PRESSURE: 80 MMHG | TEMPERATURE: 97.8 F | OXYGEN SATURATION: 95 % | SYSTOLIC BLOOD PRESSURE: 179 MMHG

## 2025-09-08 DIAGNOSIS — Z94.0 KIDNEY TRANSPLANT STATUS: ICD-10-CM

## 2025-09-08 PROCEDURE — 99214 OFFICE O/P EST MOD 30 MIN: CPT

## 2025-09-08 RX ORDER — LABETALOL HYDROCHLORIDE 200 MG/1
200 TABLET, FILM COATED ORAL 3 TIMES DAILY
Qty: 90 | Refills: 11 | Status: ACTIVE | COMMUNITY
Start: 2025-09-08 | End: 1900-01-01

## 2025-09-09 LAB
ALBUMIN SERPL ELPH-MCNC: 4.3 G/DL
ALP BLD-CCNC: 46 U/L
ALT SERPL-CCNC: 11 U/L
ANION GAP SERPL CALC-SCNC: 13 MMOL/L
APPEARANCE: CLEAR
AST SERPL-CCNC: 17 U/L
BACTERIA: NEGATIVE /HPF
BASOPHILS # BLD AUTO: 0.02 K/UL
BASOPHILS NFR BLD AUTO: 0.3 %
BILIRUB SERPL-MCNC: 0.4 MG/DL
BILIRUBIN URINE: NEGATIVE
BLOOD URINE: NEGATIVE
BUN SERPL-MCNC: 29 MG/DL
CALCIUM SERPL-MCNC: 9.7 MG/DL
CALCIUM SERPL-MCNC: 9.7 MG/DL
CAST: 0 /LPF
CHLORIDE SERPL-SCNC: 108 MMOL/L
CMV DNA SPEC QL NAA+PROBE: NOT DETECTED IU/ML
CMVPCR LOG: NOT DETECTED LOG10IU/ML
CO2 SERPL-SCNC: 21 MMOL/L
COLOR: YELLOW
CREAT SERPL-MCNC: 1.2 MG/DL
CREAT SPEC-SCNC: 81 MG/DL
CREAT/PROT UR: 0.4 RATIO
EGFRCR SERPLBLD CKD-EPI 2021: 60 ML/MIN/1.73M2
EOSINOPHIL # BLD AUTO: 0.11 K/UL
EOSINOPHIL NFR BLD AUTO: 1.4 %
EPITHELIAL CELLS: 0 /HPF
ESTIMATED AVERAGE GLUCOSE: 71 MG/DL
FERRITIN SERPL-MCNC: 449 NG/ML
FOLATE SERPL-MCNC: 8.5 NG/ML
GLUCOSE QUALITATIVE U: 100 MG/DL
GLUCOSE SERPL-MCNC: 178 MG/DL
HBA1C MFR BLD HPLC: 4.1 %
HCT VFR BLD CALC: 32.3 %
HGB BLD-MCNC: 10.4 G/DL
IMM GRANULOCYTES NFR BLD AUTO: 0.6 %
IRON SATN MFR SERPL: 33 %
IRON SERPL-MCNC: 92 UG/DL
KETONES URINE: NEGATIVE MG/DL
LEUKOCYTE ESTERASE URINE: ABNORMAL
LYMPHOCYTES # BLD AUTO: 1.67 K/UL
LYMPHOCYTES NFR BLD AUTO: 21.2 %
MAGNESIUM SERPL-MCNC: 2.2 MG/DL
MAN DIFF?: NORMAL
MCHC RBC-ENTMCNC: 31.9 PG
MCHC RBC-ENTMCNC: 32.2 G/DL
MCV RBC AUTO: 99.1 FL
MICROSCOPIC-UA: NORMAL
MONOCYTES # BLD AUTO: 0.89 K/UL
MONOCYTES NFR BLD AUTO: 11.3 %
NEUTROPHILS # BLD AUTO: 5.12 K/UL
NEUTROPHILS NFR BLD AUTO: 65.2 %
NITRITE URINE: NEGATIVE
PARATHYROID HORMONE INTACT: 168 PG/ML
PH URINE: 6.5
PHOSPHATE SERPL-MCNC: 3.2 MG/DL
PLATELET # BLD AUTO: 295 K/UL
POTASSIUM SERPL-SCNC: 4.5 MMOL/L
PROT SERPL-MCNC: 6.3 G/DL
PROT UR-MCNC: 29 MG/DL
PROTEIN URINE: 30 MG/DL
RBC # BLD: 3.26 M/UL
RBC # FLD: 15.3 %
RED BLOOD CELLS URINE: 0 /HPF
SODIUM SERPL-SCNC: 142 MMOL/L
SPECIFIC GRAVITY URINE: 1.01
TACROLIMUS SERPL-MCNC: 3.1 NG/ML
TIBC SERPL-MCNC: 281 UG/DL
TRANSFERRIN SERPL-MCNC: 238 MG/DL
UIBC SERPL-MCNC: 189 UG/DL
UROBILINOGEN URINE: 0.2 MG/DL
VIT B12 SERPL-MCNC: 220 PG/ML
WBC # FLD AUTO: 7.86 K/UL
WHITE BLOOD CELLS URINE: 1 /HPF

## 2025-09-10 ENCOUNTER — APPOINTMENT (OUTPATIENT)
Dept: RHEUMATOLOGY | Facility: CLINIC | Age: 80
End: 2025-09-10

## 2025-09-10 ENCOUNTER — MED ADMIN CHARGE (OUTPATIENT)
Age: 80
End: 2025-09-10

## 2025-09-10 VITALS
SYSTOLIC BLOOD PRESSURE: 134 MMHG | DIASTOLIC BLOOD PRESSURE: 83 MMHG | TEMPERATURE: 97.9 F | OXYGEN SATURATION: 96 % | RESPIRATION RATE: 17 BRPM | HEART RATE: 67 BPM

## 2025-09-10 VITALS
OXYGEN SATURATION: 95 % | DIASTOLIC BLOOD PRESSURE: 75 MMHG | SYSTOLIC BLOOD PRESSURE: 147 MMHG | HEART RATE: 76 BPM | RESPIRATION RATE: 17 BRPM

## 2025-09-10 VITALS
OXYGEN SATURATION: 98 % | SYSTOLIC BLOOD PRESSURE: 163 MMHG | RESPIRATION RATE: 16 BRPM | DIASTOLIC BLOOD PRESSURE: 72 MMHG | TEMPERATURE: 97.9 F | HEART RATE: 75 BPM

## 2025-09-10 VITALS — TEMPERATURE: 97.5 F

## 2025-09-10 DIAGNOSIS — R11.0 NAUSEA: ICD-10-CM

## 2025-09-10 PROCEDURE — 96374 THER/PROPH/DIAG INJ IV PUSH: CPT | Mod: 59

## 2025-09-10 PROCEDURE — 96365 THER/PROPH/DIAG IV INF INIT: CPT

## 2025-09-10 PROCEDURE — 96375 TX/PRO/DX INJ NEW DRUG ADDON: CPT

## 2025-09-10 RX ORDER — DIPHENHYDRAMINE HYDROCHLORIDE 50 MG/ML
50 INJECTION, SOLUTION INTRAMUSCULAR; INTRAVENOUS
Qty: 1 | Refills: 0 | Status: COMPLETED
Start: 2025-09-10

## 2025-09-10 RX ORDER — DIPHENHYDRAMINE HYDROCHLORIDE 50 MG/ML
50 INJECTION, SOLUTION INTRAMUSCULAR; INTRAVENOUS
Qty: 1 | Refills: 0 | Status: COMPLETED | OUTPATIENT
Start: 2025-09-10 | End: 1900-01-01

## 2025-09-10 RX ORDER — ONDANSETRON 2 MG/ML
4 INJECTION INTRAMUSCULAR; INTRAVENOUS
Refills: 0 | Status: COMPLETED | OUTPATIENT
Start: 2025-09-10 | End: 1900-01-01

## 2025-09-10 RX ORDER — ACETAMINOPHEN 500 MG/1
500 TABLET ORAL
Qty: 0 | Refills: 0 | Status: COMPLETED
Start: 2025-09-10

## 2025-09-10 RX ORDER — ONDANSETRON 2 MG/ML
4 INJECTION INTRAMUSCULAR; INTRAVENOUS
Qty: 0 | Refills: 0 | Status: COMPLETED
Start: 2025-09-10

## 2025-09-10 RX ORDER — ACETAMINOPHEN 500 MG/1
500 TABLET ORAL
Refills: 0 | Status: COMPLETED | OUTPATIENT
Start: 2025-09-10 | End: 1900-01-01

## 2025-09-10 RX ADMIN — ONDANSETRON 0 MG/2ML: 2 INJECTION INTRAMUSCULAR; INTRAVENOUS at 00:00

## 2025-09-11 LAB — BKV DNA SPEC QL NAA+PROBE: NOT DETECTED IU/ML

## 2025-09-11 RX ORDER — BELATACEPT 250 MG/1
250 INJECTION, POWDER, LYOPHILIZED, FOR SOLUTION INTRAVENOUS
Qty: 0 | Refills: 0 | Status: COMPLETED
Start: 2025-07-22

## (undated) DEVICE — DRSG SURG OPSITE 10X4"

## (undated) DEVICE — SYR LUER LOK 10CC

## (undated) DEVICE — SUT POLYSORB 3-0 30" V-20 UNDYED

## (undated) DEVICE — VENODYNE/SCD SLEEVE CALF MEDIUM

## (undated) DEVICE — SOL IRR POUR H2O 250ML

## (undated) DEVICE — DRAIN RESERVOIR FOR JACKSON PRATT 100CC CARDINAL

## (undated) DEVICE — PACK BASIC GOWN

## (undated) DEVICE — PACK BASIN SPECIAL PROCEDURE

## (undated) DEVICE — VESSEL LOOP MAXI-RED  0.120" X 16"

## (undated) DEVICE — BLADE SCALPEL SAFETYLOCK #11

## (undated) DEVICE — BAG DECANTER DISP

## (undated) DEVICE — TUBING TUR 2 PRONG

## (undated) DEVICE — NDL COUNTER FOAM AND MAGNET 40-70

## (undated) DEVICE — SUT PDS II 5-0 30" C-1

## (undated) DEVICE — SUT MONOCRYL 4-0 18" PS-2

## (undated) DEVICE — SOL IRR POUR NS 0.9% 500ML

## (undated) DEVICE — SUT BOOT STANDARD (ASSORTED) 5 PAIR

## (undated) DEVICE — DRAIN PENROSE .25" X 18" LATEX

## (undated) DEVICE — SUT PDS II PLUS 4-0 27" SH

## (undated) DEVICE — SUT SOFSILK 4-0 18" TIES

## (undated) DEVICE — SUT PROLENE 6-0 30" BV-1

## (undated) DEVICE — PREP CHLORAPREP HI-LITE ORANGE 26ML

## (undated) DEVICE — SUT SOFSILK 2-0 30" TIES

## (undated) DEVICE — SUT PDS II 0 27" CT-2

## (undated) DEVICE — DRAPE SLUSH / WARMER 44 X 66"

## (undated) DEVICE — SUT SOFSILK 4-0 30" TIES

## (undated) DEVICE — ELCTR BOVIE PENCIL SMOKE EVACUATION

## (undated) DEVICE — SUT PROLENE 6-0 30" C-1

## (undated) DEVICE — DRAPE GENERAL ENDOSCOPY

## (undated) DEVICE — AORTIC PUNCH 4.8 MM LONG HANDLE

## (undated) DEVICE — STOPCOCK 4-WAY W SWIVEL MALE LUER LOCK NON VENTED RED CAP

## (undated) DEVICE — Device

## (undated) DEVICE — SPECIMEN CONTAINER 100ML

## (undated) DEVICE — DRAPE 3/4 SHEET W REINFORCEMENT 56X77"

## (undated) DEVICE — ELCTR BOVIE TIP BLADE INSULATED 6.5" EDGE

## (undated) DEVICE — DRAPE TOWEL BLUE 17" X 24"

## (undated) DEVICE — DRAPE IOBAN 23" X 23"

## (undated) DEVICE — SUT SOFSILK 4-0 18" V-20

## (undated) DEVICE — GOWN XL EXTRA LONG

## (undated) DEVICE — FOLEY TRAY 16FR LF URINE METER SURESTEP

## (undated) DEVICE — STAPLER SKIN VISI-STAT 35 WIDE

## (undated) DEVICE — WARMING BLANKET LOWER ADULT

## (undated) DEVICE — SUT PDS II 1 36" CTX

## (undated) DEVICE — GLV 7 PROTEXIS (WHITE)

## (undated) DEVICE — ELCTR BOVIE TIP BLADE INSULATED 2.75" EDGE

## (undated) DEVICE — AORTIC PUNCH 5MM STANDARD HANDLE

## (undated) DEVICE — WARMING BLANKET UPPER ADULT

## (undated) DEVICE — GLV 7.5 PROTEXIS (BLUE)

## (undated) DEVICE — DRAPE ISOLATION BAG 20X20"

## (undated) DEVICE — SYR LUER LOK 20CC

## (undated) DEVICE — PACK MAJOR ABDOMINAL SUPINE

## (undated) DEVICE — DRAPE 1/2 SHEET 40X57"

## (undated) DEVICE — CATH IV SAFE BC 20G X 1.16" (PINK)

## (undated) DEVICE — AORTIC PUNCH 4.0MM STANDARD HANDLE

## (undated) DEVICE — SUT SOFSILK 2-0 18" TIES